# Patient Record
Sex: MALE | Race: WHITE | NOT HISPANIC OR LATINO | Employment: OTHER | URBAN - METROPOLITAN AREA
[De-identification: names, ages, dates, MRNs, and addresses within clinical notes are randomized per-mention and may not be internally consistent; named-entity substitution may affect disease eponyms.]

---

## 2017-01-06 ENCOUNTER — OFFICE VISIT (OUTPATIENT)
Dept: OPTOMETRY | Facility: CLINIC | Age: 77
End: 2017-01-06
Payer: MEDICARE

## 2017-01-06 DIAGNOSIS — E11.9 TYPE 2 DIABETES MELLITUS WITHOUT RETINOPATHY (H): Primary | ICD-10-CM

## 2017-01-06 DIAGNOSIS — H52.03 HYPEROPIA OF BOTH EYES WITH ASTIGMATISM AND PRESBYOPIA: ICD-10-CM

## 2017-01-06 DIAGNOSIS — H40.003 GLAUCOMA SUSPECT, BILATERAL: ICD-10-CM

## 2017-01-06 DIAGNOSIS — H52.4 HYPEROPIA OF BOTH EYES WITH ASTIGMATISM AND PRESBYOPIA: ICD-10-CM

## 2017-01-06 DIAGNOSIS — H43.812 PVD (POSTERIOR VITREOUS DETACHMENT), LEFT EYE: ICD-10-CM

## 2017-01-06 DIAGNOSIS — H26.9 CATARACTS, BOTH EYES: ICD-10-CM

## 2017-01-06 DIAGNOSIS — H52.203 HYPEROPIA OF BOTH EYES WITH ASTIGMATISM AND PRESBYOPIA: ICD-10-CM

## 2017-01-06 PROCEDURE — 92015 DETERMINE REFRACTIVE STATE: CPT | Mod: GY | Performed by: OPTOMETRIST

## 2017-01-06 PROCEDURE — 92014 COMPRE OPH EXAM EST PT 1/>: CPT | Performed by: OPTOMETRIST

## 2017-01-06 ASSESSMENT — CUP TO DISC RATIO
OD_RATIO: 0.6
OS_RATIO: 0.8

## 2017-01-06 ASSESSMENT — REFRACTION_WEARINGRX
OS_SPHERE: +1.25
OS_AXIS: 008
OS_ADD: +2.75
OD_SPHERE: +1.75
OD_ADD: +2.75
OD_AXIS: 167
OD_CYLINDER: +0.75
OS_CYLINDER: +0.50

## 2017-01-06 ASSESSMENT — REFRACTION_MANIFEST
OD_AXIS: 170
OS_SPHERE: +1.50
OD_ADD: +2.50
OD_CYLINDER: +0.75
OD_SPHERE: +1.75
OS_CYLINDER: +0.50
OS_AXIS: 005
OS_ADD: +2.50

## 2017-01-06 ASSESSMENT — TONOMETRY
IOP_METHOD: APPLANATION
OS_IOP_MMHG: 20
OD_IOP_MMHG: 16

## 2017-01-06 ASSESSMENT — SLIT LAMP EXAM - LIDS
COMMENTS: 2+ DERMATOCHALASIS - UPPER LID
COMMENTS: 2+ DERMATOCHALASIS - UPPER LID

## 2017-01-06 ASSESSMENT — VISUAL ACUITY
OS_SC: 20/200
OS_SC+: -2
OS_SC: 20/60
METHOD: SNELLEN - LINEAR
OD_SC: 20/80
OD_SC: 20/50

## 2017-01-06 ASSESSMENT — CONF VISUAL FIELD
OD_NORMAL: 1
OS_NORMAL: 1

## 2017-01-06 ASSESSMENT — EXTERNAL EXAM - LEFT EYE: OS_EXAM: 1+ BROW PTOSIS

## 2017-01-06 ASSESSMENT — EXTERNAL EXAM - RIGHT EYE: OD_EXAM: 1+ BROW PTOSIS

## 2017-01-06 NOTE — PROGRESS NOTES
Chief Complaint   Patient presents with     Diabetic Eye Exam     Accompanied by self  A1C      6.7   6/13/2016  A1C      5.8   4/22/2014  A1C      6.4   10/16/2013  A1C      6.7   3/26/2013  A1C      6.4   8/12/2011    Last Eye Exam: 2 years ago  Dilated Previously: Yes    What are you currently using to see?  glasses and readers- didn't bring glasses, has rx sunglasses with today    Distance Vision Acuity: Satisfied with vision    Near Vision Acuity: Not satisfied     Eye Comfort: good  Do you use eye drops? : No  Occupation or Hobbies: retired    Ruchi Youssef, [employer]     Medical, surgical and family histories reviewed and updated 1/6/2017.       OBJECTIVE: See Ophthalmology exam    ASSESSMENT:    ICD-10-CM    1. Type 2 diabetes mellitus without retinopathy (H) E11.9 EYE EXAM (SIMPLE-NONBILLABLE)   2. Glaucoma suspect, bilateral H40.003 EYE EXAM (SIMPLE-NONBILLABLE)     OPHTHALMOLOGY ADULT REFERRAL   3. Cataracts, both eyes H26.9 EYE EXAM (SIMPLE-NONBILLABLE)   4. PVD (posterior vitreous detachment), left eye H43.812 EYE EXAM (SIMPLE-NONBILLABLE)   5. Hyperopia of both eyes with astigmatism and presbyopia H52.03 EYE EXAM (SIMPLE-NONBILLABLE)    H52.203 REFRACTION    H52.4       PLAN:  Monitor, Keep blood sugar under control  Sent letter to primary care provider regarding diabetes.  Marcos Downing aware  eye exam results will be sent to Lyndon Patrick.    Refer to ophthalmology for visual fields and OCT (further testing for glaucoma)    Monitor cataracts by having yearly exams.  Wear sunglasses when outside.    A posterior vitreous detachment is nothing to worry about.  You have a jelly like substance that fills the inside of the eye, as you get older, that gel shrinks a little and when it shrinks, it pulls away from the back of the eye.  When it pulls away you can see a floater, as time goes on, the floater may shrink down out of your line of sight and you won't notice it so often.  There is  a little greater risk of developing a retinal detachment since there's nothing pressing against the retina, so if you start to notice flashes of light or sudden vision changes, return to the clinic as soon as possible, otherwise return as needed.    A final glasses prescription was given.  Allow time for adaptation.  The glasses may cause dizziness and affect depth perception for awhile.  Return to clinic 1 year for Comprehensive Vision Exam      Windy Phoenix O.D  [address]  [place]  [address]    [phone]

## 2017-01-06 NOTE — Clinical Note
Gabo Castillo, This is a glaucoma suspect patient that you've seen in the past.  It's been a few years since you've seen him so I thought he should probably have visual fields and OCT done again to see if there are any changes. He has an appointment set up with you on 2/3/17. Thanks, Windy

## 2017-01-06 NOTE — MR AVS SNAPSHOT
After Visit Summary   1/6/2017    Marcos Downing    MRN: [id number]           Patient Information     Date Of Birth          1940        Visit Information        Provider Department      1/6/2017 10:30 AM Windy Phoenix OD [facility]        Today's Diagnoses     Type 2 diabetes mellitus without retinopathy (H)    -  1     Glaucoma suspect, bilateral         Cataracts, both eyes         PVD (posterior vitreous detachment), left eye         Hyperopia of both eyes with astigmatism and presbyopia           Care Instructions        Monitor, Keep blood sugar under control  Sent letter to primary care provider regarding diabetes    Refer to ophthalmology for visual fields and OCT (further testing for glaucoma)    Monitor cataracts by having yearly exams.  Wear sunglasses when outside.    A posterior vitreous detachment is nothing to worry about.  You have a jelly like substance that fills the inside of the eye, as you get older, that gel shrinks a little and when it shrinks, it pulls away from the back of the eye.  When it pulls away you can see a floater, as time goes on, the floater may shrink down out of your line of sight and you won't notice it so often.  There is a little greater risk of developing a retinal detachment since there's nothing pressing against the retina, so if you start to notice flashes of light or sudden vision changes, return to the clinic as soon as possible, otherwise return as needed.    A final glasses prescription was given.  Allow time for adaptation.  The glasses may cause dizziness and affect depth perception for awhile.  Return to clinic 1 year for Comprehensive Vision Exam      Windy Phoenix O.D  [facility]  [address]  [place]  [address]    [phone]                Follow-ups after your visit        Additional Services     OPHTHALMOLOGY ADULT REFERRAL       Your provider has referred you to:  [facility]: Dionisio Bonner  "Ceasar Bonner [phone]   [email or web address]      Please be aware that coverage of these services is subject to the terms and limitations of your health insurance plan.  Call member services at your health plan with any benefit or coverage questions.      Please bring the following to your appointment:  >>   Any x-rays, CTs or MRIs which have been performed.  Contact the facility where they were done to arrange for  prior to your scheduled appointment.  Any new CT, MRI or other procedures ordered by your specialist must be performed at a [facility] facility or coordinated by your clinic's referral office.    >>   List of current medications   >>   This referral request   >>   Any documents/labs given to you for this referral                  Follow-up notes from your care team     Return in about 1 year (around 1/6/2018) for Eye Exam.      Who to contact     If you have questions or need follow up information about today's clinic visit or your schedule please contact [facility] directly at [phone].  Normal or non-critical lab and imaging results will be communicated to you by MyChart, letter or phone within 4 business days after the clinic has received the results. If you do not hear from us within 7 days, please contact the clinic through MyChart or phone. If you have a critical or abnormal lab result, we will notify you by phone as soon as possible.  Submit refill requests through [employer] or call your pharmacy and they will forward the refill request to us. Please allow 3 business days for your refill to be completed.          Additional Information About Your Visit        Neuronexhart Information     [employer]Out lets you send messages to your doctor, view your test results, renew your prescriptions, schedule appointments and more. To sign up, go to [email or web address] . Click on \"Log in\" on the left side of the screen, which will take you to the Welcome page. Then " "click on \"Sign up Now\" on the right side of the page.     You will be asked to enter the access code listed below, as well as some personal information. Please follow the directions to create your username and password.     Your access code is: [email or web address]  Expires: 2017 11:49 AM     Your access code will  in 90 days. If you need help or a new code, please call your [facility] clinic or [phone].        Care EveryWhere ID     This is your Care EveryWhere ID. This could be used by other organizations to access your [facility] medical records  [id number]         Blood Pressure from Last 3 Encounters:   16 128/62   14 118/59   14 134/60    Weight from Last 3 Encounters:   16 85.503 kg (188 lb 8 oz)   14 82.283 kg (181 lb 6.4 oz)   14 82.645 kg (182 lb 3.2 oz)              We Performed the Following     EYE EXAM (SIMPLE-NONBILLABLE)     OPHTHALMOLOGY ADULT REFERRAL     REFRACTION        Primary Care Provider Office Phone # Fax #    Lyndon Patrick [phone]       [address] [address]        Thank you!     Thank you for choosing [facility]  for your care. Our goal is always to provide you with excellent care. Hearing back from our patients is one way we can continue to improve our services. Please take a few minutes to complete the written survey that you may receive in the mail after your visit with us. Thank you!             Your Updated Medication List - Protect others around you: Learn how to safely use, store and throw away your medicines at www.disposemymeds.org.          This list is accurate as of: 17 11:49 AM.  Always use your most recent med list.                   Brand Name Dispense Instructions for use    ALLEGRA 180 MG tablet   Generic drug:  fexofenadine      Take 1 tablet by mouth daily As needed       ANTIOXIDANT WITH CO Q-10 PO      Take 1 tablet by mouth daily       aspirin " 325 MG EC tablet      Take 325 mg by mouth daily.       atorvastatin 10 MG tablet    LIPITOR    90 tablet    Take 1 tablet (10 mg) by mouth daily       blood glucose monitoring lancets     1 Box    USE 1 DAILY       blood glucose monitoring test strip    ACCU-CHEK SMARTVIEW    100 strip    1 strip by In Vitro route daily       DAILY VITAMINS PO      Take  by mouth. daily       terbinafine 250 MG tablet    lamISIL    90 tablet    Take 1 tablet (250 mg) by mouth daily

## 2017-01-06 NOTE — Clinical Note
[facility]  Optometry Department      1/6/2017    Re:  Marcos Downing  1940   [id number]    Dear Dr. Patrick,    Your patient was seen in our office on 1/6/2017 for a dilated diabetic eye exam.      Findings:    No Retinopathy  OU - Both  Glaucoma suspect both eyes   Cataracts both eyes   Posterior Vitreous Detachment left eye     Comments:  Marcos should return for a comprehensive eye exam in 1 year.    Sincerely,        Windy Phoenix O.D  [address]  [place]  [address]    [phone]

## 2017-02-03 ENCOUNTER — OFFICE VISIT (OUTPATIENT)
Dept: OPHTHALMOLOGY | Facility: CLINIC | Age: 77
End: 2017-02-03
Payer: MEDICARE

## 2017-02-03 DIAGNOSIS — H43.812 POSTERIOR VITREOUS DETACHMENT, LEFT: ICD-10-CM

## 2017-02-03 DIAGNOSIS — H40.003 GLAUCOMA SUSPECT, BILATERAL: Primary | ICD-10-CM

## 2017-02-03 PROCEDURE — 92012 INTRM OPH EXAM EST PATIENT: CPT | Performed by: OPHTHALMOLOGY

## 2017-02-03 PROCEDURE — 92083 EXTENDED VISUAL FIELD XM: CPT | Performed by: OPHTHALMOLOGY

## 2017-02-03 PROCEDURE — 92133 CPTRZD OPH DX IMG PST SGM ON: CPT | Performed by: OPHTHALMOLOGY

## 2017-02-03 ASSESSMENT — VISUAL ACUITY
OD_CC: 20/25-2
METHOD: SNELLEN - LINEAR
OS_CC: 20/30+2

## 2017-02-03 ASSESSMENT — TONOMETRY
IOP_METHOD: APPLANATION
OS_IOP_MMHG: 19
OD_IOP_MMHG: 15

## 2017-02-03 NOTE — MR AVS SNAPSHOT
"              After Visit Summary   2/3/2017    Marcos Downing    MRN: [id number]           Patient Information     Date Of Birth          1940        Visit Information        Provider Department      2/3/2017 3:15 PM Regis Castillo MD [facility]        Care Instructions    Continue observation regarding glaucoma suspect status.  Call in 2017 for an appointment in 2018 for Complete Exam    Dr. Castillo [phone]        Follow-ups after your visit        Who to contact     If you have questions or need follow up information about today's clinic visit or your schedule please contact [facility] directly at [phone].  Normal or non-critical lab and imaging results will be communicated to you by MyChart, letter or phone within 4 business days after the clinic has received the results. If you do not hear from us within 7 days, please contact the clinic through MyChart or phone. If you have a critical or abnormal lab result, we will notify you by phone as soon as possible.  Submit refill requests through [employer] or call your pharmacy and they will forward the refill request to us. Please allow 3 business days for your refill to be completed.          Additional Information About Your Visit        MyChart Information     L[employer]rd lets you send messages to your doctor, view your test results, renew your prescriptions, schedule appointments and more. To sign up, go to [email or web address] . Click on \"Log in\" on the left side of the screen, which will take you to the Welcome page. Then click on \"Sign up Now\" on the right side of the page.     You will be asked to enter the access code listed below, as well as some personal information. Please follow the directions to create your username and password.     Your access code is: [email or web address]  Expires: 2017 11:49 AM     Your access code will  in 90 days. If you need help or a new code, please call your " [facility] or [phone].        Care EveryWhere ID     This is your Care EveryWhere ID. This could be used by other organizations to access your [facility] medical records  [id number]         Blood Pressure from Last 3 Encounters:   06/13/16 128/62   06/06/14 118/59   05/07/14 134/60    Weight from Last 3 Encounters:   06/13/16 85.503 kg (188 lb 8 oz)   06/06/14 82.283 kg (181 lb 6.4 oz)   05/07/14 82.645 kg (182 lb 3.2 oz)              Today, you had the following     No orders found for display       Primary Care Provider Office Phone # Fax #    Lyndon Patrick [phone]       [facility] [address] [place] [address]        Thank you!     Thank you for choosing [facility]  for your care. Our goal is always to provide you with excellent care. Hearing back from our patients is one way we can continue to improve our services. Please take a few minutes to complete the written survey that you may receive in the mail after your visit with us. Thank you!             Your Updated Medication List - Protect others around you: Learn how to safely use, store and throw away your medicines at www.disposemymeds.org.          This list is accurate as of: 2/3/17  4:51 PM.  Always use your most recent med list.                   Brand Name Dispense Instructions for use    ALLEGRA 180 MG tablet   Generic drug:  fexofenadine      Take 1 tablet by mouth daily As needed       ANTIOXIDANT WITH CO Q-10 PO      Take 1 tablet by mouth daily       aspirin 325 MG EC tablet      Take 325 mg by mouth daily.       atorvastatin 10 MG tablet    LIPITOR    90 tablet    Take 1 tablet (10 mg) by mouth daily       blood glucose monitoring lancets     1 Box    USE 1 DAILY       blood glucose monitoring test strip    ACCU-CHEK SMARTVIEW    100 strip    1 strip by In Vitro route daily       DAILY VITAMINS PO      Take  by mouth. daily       terbinafine 250 MG tablet    lamISIL    90  tablet    Take 1 tablet (250 mg) by mouth daily

## 2017-02-03 NOTE — PROGRESS NOTES
Current Eye Medications:  none     Subjective:  Referred by Dr Phoenix for GLC eval. HVF, OCT, & IOP check. No vision changes or concerns.    Was hit by ball left eye as child.       Objective:  See Ophthalmology Exam.       Assessment:  Stable intraocular pressure, glaucoma OCT, and [place] Visual Field both eyes in patient who is a glaucoma suspect.      Plan: Continue observation regarding glaucoma suspect status.  Call in September 2017 for an appointment in January 2018 for Complete Exam    Dr. Castillo [phone]

## 2017-02-03 NOTE — PATIENT INSTRUCTIONS
Continue observation regarding glaucoma suspect status.  Call in September 2017 for an appointment in January 2018 for Complete Exam    Dr. Castillo [phone]

## 2017-02-04 PROBLEM — H43.812 POSTERIOR VITREOUS DETACHMENT, LEFT: Status: ACTIVE | Noted: 2017-02-04

## 2017-02-04 ASSESSMENT — EXTERNAL EXAM - RIGHT EYE: OD_EXAM: 1+ BROW PTOSIS

## 2017-02-04 ASSESSMENT — SLIT LAMP EXAM - LIDS
COMMENTS: 2+ DERMATOCHALASIS - UPPER LID
COMMENTS: 2+ DERMATOCHALASIS - UPPER LID

## 2017-02-04 ASSESSMENT — PACHYMETRY
OD_CT(UM): 598
OS_CT(UM): 578

## 2017-02-04 ASSESSMENT — EXTERNAL EXAM - LEFT EYE: OS_EXAM: 1+ BROW PTOSIS

## 2017-06-05 ENCOUNTER — OFFICE VISIT (OUTPATIENT)
Dept: FAMILY MEDICINE | Facility: CLINIC | Age: 77
End: 2017-06-05
Payer: MEDICARE

## 2017-06-05 VITALS
OXYGEN SATURATION: 96 % | HEIGHT: 71 IN | HEART RATE: 78 BPM | BODY MASS INDEX: 26.54 KG/M2 | DIASTOLIC BLOOD PRESSURE: 72 MMHG | SYSTOLIC BLOOD PRESSURE: 136 MMHG | TEMPERATURE: 97.4 F | WEIGHT: 189.6 LBS

## 2017-06-05 DIAGNOSIS — N40.0 BENIGN PROSTATIC HYPERPLASIA WITHOUT LOWER URINARY TRACT SYMPTOMS, UNSPECIFIED MORPHOLOGY: ICD-10-CM

## 2017-06-05 DIAGNOSIS — R35.0 URINARY FREQUENCY: ICD-10-CM

## 2017-06-05 DIAGNOSIS — R35.0 FREQUENCY OF MICTURITION: Primary | ICD-10-CM

## 2017-06-05 DIAGNOSIS — J32.0 CHRONIC MAXILLARY SINUSITIS: ICD-10-CM

## 2017-06-05 DIAGNOSIS — E78.5 HYPERLIPIDEMIA LDL GOAL <100: ICD-10-CM

## 2017-06-05 DIAGNOSIS — E11.65 TYPE 2 DIABETES MELLITUS WITH HYPERGLYCEMIA, WITHOUT LONG-TERM CURRENT USE OF INSULIN (H): ICD-10-CM

## 2017-06-05 DIAGNOSIS — Z23 NEED FOR PROPHYLACTIC VACCINATION AGAINST STREPTOCOCCUS PNEUMONIAE (PNEUMOCOCCUS): ICD-10-CM

## 2017-06-05 DIAGNOSIS — Z13.89 SCREENING FOR DIABETIC PERIPHERAL NEUROPATHY: ICD-10-CM

## 2017-06-05 LAB
ALBUMIN UR-MCNC: NEGATIVE MG/DL
APPEARANCE UR: CLEAR
BILIRUB UR QL STRIP: NEGATIVE
COLOR UR AUTO: YELLOW
GLUCOSE UR STRIP-MCNC: NEGATIVE MG/DL
HGB UR QL STRIP: ABNORMAL
KETONES UR STRIP-MCNC: NEGATIVE MG/DL
LEUKOCYTE ESTERASE UR QL STRIP: NEGATIVE
NITRATE UR QL: NEGATIVE
PH UR STRIP: 6.5 PH (ref 5–7)
RBC #/AREA URNS AUTO: NORMAL /HPF (ref 0–2)
SP GR UR STRIP: 1.01 (ref 1–1.03)
URN SPEC COLLECT METH UR: ABNORMAL
UROBILINOGEN UR STRIP-ACNC: 0.2 EU/DL (ref 0.2–1)
WBC #/AREA URNS AUTO: NORMAL /HPF (ref 0–2)

## 2017-06-05 PROCEDURE — 99214 OFFICE O/P EST MOD 30 MIN: CPT | Mod: 25 | Performed by: FAMILY MEDICINE

## 2017-06-05 PROCEDURE — 81001 URINALYSIS AUTO W/SCOPE: CPT | Performed by: FAMILY MEDICINE

## 2017-06-05 PROCEDURE — G0009 ADMIN PNEUMOCOCCAL VACCINE: HCPCS | Performed by: FAMILY MEDICINE

## 2017-06-05 PROCEDURE — 90670 PCV13 VACCINE IM: CPT | Performed by: FAMILY MEDICINE

## 2017-06-05 RX ORDER — FLUTICASONE PROPIONATE 50 MCG
2 SPRAY, SUSPENSION (ML) NASAL DAILY
Qty: 16 G | Refills: 1 | Status: SHIPPED | OUTPATIENT
Start: 2017-06-05 | End: 2018-05-24

## 2017-06-05 NOTE — PATIENT INSTRUCTIONS
[facility]    If you have any questions regarding to your visit please contact your care team:       Team Purple:   Clinic Hours Telephone Number   Dr. Cate Jane     7am-7pm  Monday - Thursday   7am-5pm  Fridays  [phone]  (Appointment scheduling available 24/7)    Questions about your Visit?   Team Line:  [phone]   Urgent Care - [place] and [facility] - 11am-9pm Monday-Friday Saturday-Sunday- 9am-5pm   [place] - 5pm-9pm Monday-Friday Saturday-Sunday- 9am-5pm  [phone] - [facility]  [phone] - [place]       What options do I have for visits at the clinic other than the traditional office visit?  To expand how we care for you, many of our providers are utilizing electronic visits (e-visits) and telephone visits, when medically appropriate, for interactions with their patients rather than a visit in the clinic.   We also offer nurse visits for many medical concerns. Just like any other service, we will bill your insurance company for this type of visit based on time spent on the phone with your provider. Not all insurance companies cover these visits. Please check with your medical insurance if this type of visit is covered. You will be responsible for any charges that are not paid by your insurance.      E-visits via Neuronex:  generally incur a $35.00 fee.  Telephone visits:  Time spent on the phone: *charged based on time that is spent on the phone in increments of 10 minutes. Estimated cost:   5-10 mins $30.00   11-20 mins. $59.00   21-30 mins. $85.00     Use [employer] (secure email communication and access to your chart) to send your primary care provider a message or make an appointment. Ask someone on your Team how to sign up for Neuronex.  For a Price Quote for your services, please call our Consumer Price Line at [phone].  As always, Thank you for trusting us with your health care needs!      Jenn Conti  CMA

## 2017-06-05 NOTE — NURSING NOTE
"Chief Complaint   Patient presents with     Urinary Frequency     ongoing issue and getting worse x 3 months      Sinusitis     runny nose     Dehydration     Medication Reconciliation     stopped atorvastatin x 1 yrs ago        Initial /72  Pulse 78  Temp 97.4  F (36.3  C) (Oral)  Ht 5' 11.14\" (1.807 m)  Wt 189 lb 9.6 oz (86 kg)  SpO2 96%  BMI 26.34 kg/m2 Estimated body mass index is 26.34 kg/(m^2) as calculated from the following:    Height as of this encounter: 5' 11.14\" (1.807 m).    Weight as of this encounter: 189 lb 9.6 oz (86 kg).  Medication Reconciliation: complete  An CLAUDIA Sevilla    "

## 2017-06-05 NOTE — MR AVS SNAPSHOT
After Visit Summary   6/5/2017    Marcos Downing    MRN: [id number]           Patient Information     Date Of Birth          1940        Visit Information        Provider Department      6/5/2017 11:20 AM Lyndon Patrick MD [facility]        Today's Diagnoses     Frequency of micturition    -  1    Benign prostatic hyperplasia without lower urinary tract symptoms, unspecified morphology        Chronic maxillary sinusitis        Type 2 diabetes mellitus with hyperglycemia, without long-term current use of insulin (H)        Urinary frequency        Screening for diabetic peripheral neuropathy        Need for prophylactic vaccination against Streptococcus pneumoniae (pneumococcus)        Hyperlipidemia LDL goal <100          Care Instructions    [facility]    If you have any questions regarding to your visit please contact your care team:       Team Purple:   Clinic Hours Telephone Number   Dr. Cate Jane     7am-7pm  Monday - Thursday   7am-5pm  Fridays  [phone]  (Appointment scheduling available 24/7)    Questions about your Visit?   Team Line:  [phone]   Urgent Care - [place] - 11am-9pm Monday-Friday Saturday-Sunday- 9am-5pm   [place] - 5pm-9pm Monday-Friday Saturday-Sunday- 9am-5pm  [phone] - [place]   [phone] - [place]       What options do I have for visits at the clinic other than the traditional office visit?  To expand how we care for you, many of our providers are utilizing electronic visits (e-visits) and telephone visits, when medically appropriate, for interactions with their patients rather than a visit in the clinic.   We also offer nurse visits for many medical concerns. Just like any other service, we will bill your insurance company for this type of visit based on time spent on the phone with your provider. Not all insurance companies cover  these visits. Please check with your medical insurance if this type of visit is covered. You will be responsible for any charges that are not paid by your insurance.      E-visits via L2Chart:  generally incur a $35.00 fee.  Telephone visits:  Time spent on the phone: *charged based on time that is spent on the phone in increments of 10 minutes. Estimated cost:   5-10 mins $30.00   11-20 mins. $59.00   21-30 mins. $85.00     Use Mogotest (secure email communication and access to your chart) to send your primary care provider a message or make an appointment. Ask someone on your Team how to sign up for Mogotest.  For a Price Quote for your services, please call our [employer] Line at [phone].  As always, Thank you for trusting us with your health care needs!      Jenn Conti CMA            Follow-ups after your visit        Additional Services     OTOLARYNGOLOGY REFERRAL       Your provider has referred you to: [facility]: [facility] [phone]   [email or web address]    Please be aware that coverage of these services is subject to the terms and limitations of your health insurance plan.  Call member services at your health plan with any benefit or coverage questions.      Please bring the following with you to your appointment:    (1) Any X-Rays, CTs or MRIs which have been performed.  Contact the facility where they were done to arrange for  prior to your scheduled appointment.   (2) List of current medications  (3) This referral request   (4) Any documents/labs given to you for this referral            UROLOGY ADULT REFERRAL       Your provider has referred you to: [facility]: [facility] [phone]   [email or web address]    Please be aware that coverage of these services is subject to the terms and limitations of your health insurance plan.  Call member services at your health plan with any benefit or coverage  "questions.      Please bring the following to your appointment:    >>   Any x-rays, CTs or MRIs which have been performed.  Contact the facility where they were done to arrange for  prior to your scheduled appointment.  Any new CT, MRI or other procedures ordered by your specialist must be performed at a [facility] facility or coordinated by your clinic's referral office.    >>   List of current medications   >>   This referral request   >>   Any documents/labs given to you for this referral                  Future tests that were ordered for you today     Open Future Orders        Priority Expected Expires Ordered    HEMOGLOBIN A1C Routine  6/5/2018 6/5/2017    Albumin Random Urine Quantitative Routine  6/5/2018 6/5/2017    Basic metabolic panel Routine  6/5/2018 6/5/2017    Lipid panel reflex to direct LDL Routine  6/5/2018 6/5/2017            Who to contact     If you have questions or need follow up information about today's clinic visit or your schedule please contact [facility] directly at [phone].  Normal or non-critical lab and imaging results will be communicated to you by [employer], letter or phone within 4 business days after the clinic has received the results. If you do not hear from us within 7 days, please contact the clinic through [employer] or phone. If you have a critical or abnormal lab result, we will notify you by phone as soon as possible.  Submit refill requests through [employer] ([employer]) or call your pharmacy and they will forward the refill request to us. Please allow 3 business days for your refill to be completed.          Additional Information About Your Visit        [employer]) Information     [employer] lets you send messages to your doctor, view your test results, renew your prescriptions, schedule appointments and more. To sign up, go to [email or web address]) . Click on \"Log in\" on the left side of the screen, which will take you to the Welcome page. Then click on \"Sign up Now\" " "on the right side of the page.     You will be asked to enter the access code listed below, as well as some personal information. Please follow the directions to create your username and password.     Your access code is: [email or web address]  Expires: 9/3/2017 12:36 PM     Your access code will  in 90 days. If you need help or a new code, please call your [facility] clinic or [phone].        Care EveryWhere ID     This is your Care EveryWhere ID. This could be used by other organizations to access your [facility] medical records  [id number]        Your Vitals Were     Pulse Temperature Height Pulse Oximetry BMI (Body Mass Index)       78 97.4  F (36.3  C) (Oral) 5' 11.14\" (1.807 m) 96% 26.34 kg/m2        Blood Pressure from Last 3 Encounters:   17 136/72   16 128/62   14 118/59    Weight from Last 3 Encounters:   17 189 lb 9.6 oz (86 kg)   16 188 lb 8 oz (85.5 kg)   14 181 lb 6.4 oz (82.3 kg)              We Performed the Following     OTOLARYNGOLOGY REFERRAL     PNEUMOCOCCAL CONJ VACCINE 13 VALENT IM     UA reflex to Microscopic and Culture     Urine Microscopic     UROLOGY ADULT REFERRAL          Today's Medication Changes          These changes are accurate as of: 17 12:36 PM.  If you have any questions, ask your nurse or doctor.               Start taking these medicines.        Dose/Directions    fluticasone 50 MCG/ACT spray   Commonly known as:  FLONASE   Used for:  Chronic maxillary sinusitis   Started by:  Lyndon Patrick MD        Dose:  2 spray   Spray 2 sprays into both nostrils daily   Quantity:  16 g   Refills:  1            Where to get your medicines      These medications were sent to [facility] - [place], [place] - [address]  [address], [facility] [address]     Phone:  [phone]     fluticasone 50 MCG/ACT spray                Primary Care Provider Office Phone # Fax #    Lyndon Patrick MD " [phone]       [facility] [address]  [facility] [address]        Thank you!     Thank you for choosing [facility]  for your care. Our goal is always to provide you with excellent care. Hearing back from our patients is one way we can continue to improve our services. Please take a few minutes to complete the written survey that you may receive in the mail after your visit with us. Thank you!             Your Updated Medication List - Protect others around you: Learn how to safely use, store and throw away your medicines at www.disposemymeds.org.          This list is accurate as of: 6/5/17 12:36 PM.  Always use your most recent med list.                   Brand Name Dispense Instructions for use    ALLEGRA 180 MG tablet   Generic drug:  fexofenadine      Take 1 tablet by mouth daily As needed       ANTIOXIDANT WITH CO Q-10 PO      Take 1 tablet by mouth daily       aspirin 325 MG EC tablet      Take 325 mg by mouth daily.       atorvastatin 10 MG tablet    LIPITOR    90 tablet    Take 1 tablet (10 mg) by mouth daily       blood glucose monitoring lancets     1 Box    USE 1 DAILY       blood glucose monitoring test strip    ACCU-CHEK SMARTVIEW    100 strip    1 strip by In Vitro route daily       DAILY VITAMINS PO      Take  by mouth. daily       fluticasone 50 MCG/ACT spray    FLONASE    16 g    Spray 2 sprays into both nostrils daily       terbinafine 250 MG tablet    lamISIL    90 tablet    Take 1 tablet (250 mg) by mouth daily

## 2017-06-05 NOTE — PROGRESS NOTES
SUBJECTIVE:                                                    Marcos Downing is a 77 year old male who presents to clinic today for the following health issues:    ENT Symptoms             Symptoms: cc Present Absent Comment   Fever/Chills   x    Fatigue   x    Muscle Aches   x    Eye Irritation   x    Sneezing  x     Nasal Siddharth/Drg  x  Runny nose, Nasal congested    Sinus Pressure/Pain  x     Loss of smell   x    Dental pain   x    Sore Throat   x    Swollen Glands   x    Ear Pain/Fullness   x    Cough   x    Wheeze   x    Chest Pain   x    Shortness of breath   x    Rash   x    Other  x  Blood when blowing nose      Symptom duration:  ongoing    Symptom severity:  severe    Treatments tried:  Allegra D    Contacts:  none       Genitourinary symptoms      Duration: ongoing issue but for the last 3 months it is getting worse     Description:  frequency and retention    Intensity:  severe    Accompanying signs and symptoms (fever/discharge/nausea/vomiting/back or abdominal pain):  none    History (frequent UTI's/kidney stones/prostate problems): None  Sexually active: no     Precipitating or alleviating factors: None    Therapies tried and outcome: none   Outcome: none     Been going for several years but gotten worse lately  Has hard accidents.    Sinus problem   Been cutting on fluid   Been having bleed from monika ijn the AM from left side mainly  Pressure behind the eyes. Clears throat a lot.    Diabetes Follow-up      Patient is checking blood sugars: rarely.  Results range from 110 to 170's    Diabetic concerns: None     Symptoms of hypoglycemia (low blood sugar): none     Paresthesias (numbness or burning in feet) or sores: No     Date of last diabetic eye exam: 1/6/2016     Hyperlipidemia Follow-Up      Rate your low fat/cholesterol diet?: good    Taking statin?  No    Other lipid medications/supplements?:  none       Problem list and histories reviewed & adjusted, as indicated.  Additional history: as  "documented    Patient Active Problem List   Diagnosis     Hyperlipidemia with target LDL less than 100     Type 2 diabetes mellitus with hemoglobin A1c goal of less than 7.0% (H)     Cataracts, both eyes     Type 2 diabetes mellitus without retinopathy (H)     Glaucoma suspect, bilateral     Posterior vitreous detachment, left     Past Surgical History:   Procedure Laterality Date     CYSTOSCOPY, DILATE URETHRA, COMBINED     1996     TONSILLECTOMY      T & A  age 3     VASECTOMY  Age 39       Social History   Substance Use Topics     Smoking status: Former Smoker     Quit date: 4/1/1971     Smokeless tobacco: Never Used     Alcohol use Yes      Comment: special occasions only     Family History   Problem Relation Age of Onset     DIABETES Mother      HEART DISEASE Mother      DIABETES Father      DIABETES Brother      CANCER Brother      CANCER Sister      DIABETES Sister      DIABETES Brother      Glaucoma No family hx of      Macular Degeneration No family hx of            Reviewed and updated as needed this visit by clinical staff  Tobacco  Allergies  Meds  Med Hx  Surg Hx  Fam Hx  Soc Hx      Reviewed and updated as needed this visit by Provider         ROS:  Constitutional, HEENT, cardiovascular, pulmonary, gi and gu systems are negative, except as otherwise noted.    OBJECTIVE:                                                    /72  Pulse 78  Temp 97.4  F (36.3  C) (Oral)  Ht 5' 11.14\" (1.807 m)  Wt 189 lb 9.6 oz (86 kg)  SpO2 96%  BMI 26.34 kg/m2  Body mass index is 26.34 kg/(m^2).  GENERAL: healthy, alert and no distress  ENT: Nasal congestion, no polyp, no sinus tenderness  NECK: no adenopathy and thyroid normal to palpation  RESP: lungs clear to auscultation - no rales, rhonchi or wheezes  CV: regular rate and rhythm, normal S1 S2, no S3 or S4, no murmur, click or rub  ABDOMEN: soft, nontender, no masses and bowel sounds normal  MS: no gross musculoskeletal defects noted, no edema  SKIN: " no suspicious lesions or rashes  NEURO: Normal strength and tone, mentation intact and speech normal  PSYCH: mentation appears normal, affect normal/bright  Diabetic foot exam: normal DP and PT pulses, no trophic changes or ulcerative lesions and normal sensory exam    Diagnostic Test Results:     Results for orders placed or performed in visit on 06/05/17   UA reflex to Microscopic and Culture   Result Value Ref Range    Color Urine Yellow     Appearance Urine Clear     Glucose Urine Negative NEG mg/dL    Bilirubin Urine Negative NEG    Ketones Urine Negative NEG mg/dL    Specific Gravity Urine 1.010 1.003 - 1.035    Blood Urine Trace (A) NEG    pH Urine 6.5 5.0 - 7.0 pH    Protein Albumin Urine Negative NEG mg/dL    Urobilinogen Urine 0.2 0.2 - 1.0 EU/dL    Nitrite Urine Negative NEG    Leukocyte Esterase Urine Negative NEG    Source Midstream Urine    Urine Microscopic   Result Value Ref Range    WBC Urine O - 2 0 - 2 /HPF    RBC Urine O - 2 0 - 2 /HPF          ASSESSMENT/PLAN:                                                    (R35.0) Frequency of micturition  (primary encounter diagnosis)  Comment: Discussed evaluation by urology due to worsening obstructive symptoms  Plan: UROLOGY ADULT REFERRAL    (N40.0) Benign prostatic hyperplasia without lower urinary tract symptoms, unspecified morphology  Plan: UROLOGY ADULT REFERRAL    (R35.0) Urinary frequency  Comment: UA normal  Plan: UA reflex to Microscopic and Culture, Urine         Microscopic    (J32.0) Chronic maxillary sinusitis  Comment: This has been a chronic issue, will do a decongestant at this time but have evaluation by ENT incase of another pathological process  Plan: OTOLARYNGOLOGY REFERRAL, fluticasone (FLONASE)         50 MCG/ACT spray    (E11.65) Type 2 diabetes mellitus with hyperglycemia, without long-term current use of insulin (H)  Comment: Diet controlled. Blood work when fasting  Plan: HEMOGLOBIN A1C, Albumin Random Urine          Quantitative, Basic metabolic panel, Lipid         panel reflex to direct LDL    (Z23) Need for prophylactic vaccination against Streptococcus pneumoniae (pneumococcus)  Comment: Pneumonia shot  Plan: PNEUMOCOCCAL CONJ VACCINE 13 VALENT IM    Lyndon Patrick MD  [facility]

## 2017-06-08 ENCOUNTER — OFFICE VISIT (OUTPATIENT)
Dept: OTOLARYNGOLOGY | Facility: CLINIC | Age: 77
End: 2017-06-08
Payer: MEDICARE

## 2017-06-08 VITALS — BODY MASS INDEX: 26.46 KG/M2 | RESPIRATION RATE: 16 BRPM | WEIGHT: 189 LBS | HEIGHT: 71 IN

## 2017-06-08 DIAGNOSIS — R04.0 EPISTAXIS: ICD-10-CM

## 2017-06-08 DIAGNOSIS — J31.0 CHRONIC RHINITIS: Primary | ICD-10-CM

## 2017-06-08 PROCEDURE — 99203 OFFICE O/P NEW LOW 30 MIN: CPT | Performed by: OTOLARYNGOLOGY

## 2017-06-08 RX ORDER — IPRATROPIUM BROMIDE 42 UG/1
2 SPRAY, METERED NASAL 4 TIMES DAILY PRN
Qty: 1 BOX | Refills: 11 | Status: SHIPPED | OUTPATIENT
Start: 2017-06-08 | End: 2018-05-24

## 2017-06-08 ASSESSMENT — PAIN SCALES - GENERAL: PAINLEVEL: NO PAIN (0)

## 2017-06-08 NOTE — PROGRESS NOTES
Chief Complaint - nasal drainage, epistaxis    History of Present Illness - Marcos Downing is a 77 year old male who presents for evaluation of nose symptoms. The patient describes symptoms of clear rhinorrhea, sometimes bloody. Bleeding in the morning, left side. Also has worse runny nose with eating. Chronic problem. Treatments include flonase. Also has a lot of throat clearing.     Past Medical History -   Patient Active Problem List   Diagnosis     Hyperlipidemia with target LDL less than 100     Type 2 diabetes mellitus with hemoglobin A1c goal of less than 7.0% (H)     Cataracts, both eyes     Type 2 diabetes mellitus without retinopathy (H)     Glaucoma suspect, bilateral     Posterior vitreous detachment, left       Current Medications -   Current Outpatient Prescriptions:      fluticasone (FLONASE) 50 MCG/ACT spray, Spray 2 sprays into both nostrils daily, Disp: 16 g, Rfl: 1     blood glucose (ACCU-CHEK SMARTVIEW) test strip, 1 strip by In Vitro route daily, Disp: 100 strip, Rfl: 0     blood glucose monitoring (SOFTCLIX) lancets, USE 1 DAILY, Disp: 1 Box, Rfl: 20     terbinafine (LAMISIL) 250 MG tablet, Take 1 tablet (250 mg) by mouth daily, Disp: 90 tablet, Rfl: 0     fexofenadine (ALLEGRA) 180 MG tablet, Take 1 tablet by mouth daily As needed, Disp: , Rfl:      Nutritional Supplements (ANTIOXIDANT WITH CO Q-10 PO), Take 1 tablet by mouth daily, Disp: , Rfl:      atorvastatin (LIPITOR) 10 MG tablet, Take 1 tablet (10 mg) by mouth daily, Disp: 90 tablet, Rfl: 3     Multiple Vitamin (DAILY VITAMINS PO), Take  by mouth. daily, Disp: , Rfl:      aspirin 325 MG EC tablet, Take 325 mg by mouth daily., Disp: , Rfl:     Allergies -   Allergies   Allergen Reactions     Niaspan [Niacin]      Flushing.       Social History -   Social History     Social History     Marital status:      Spouse name: N/A     Number of children: N/A     Years of education: N/A     Social History Main Topics     Smoking status:  "Former Smoker     Quit date: 4/1/1971     Smokeless tobacco: Never Used     Alcohol use Yes      Comment: special occasions only     Drug use: No     Sexual activity: Yes     Partners: Female     Other Topics Concern     None     Social History Narrative       Family History -   Family History   Problem Relation Age of Onset     DIABETES Mother      HEART DISEASE Mother      DIABETES Father      DIABETES Brother      CANCER Brother      CANCER Sister      DIABETES Sister      DIABETES Brother      Glaucoma No family hx of      Macular Degeneration No family hx of        Review of Systems - As per HPI and PMHx, otherwise 7 system review of the head and neck negative.    Physical Exam  Resp 16  Ht 1.807 m (5' 11.14\")  Wt 85.7 kg (189 lb)  BMI 26.26 kg/m2  General - The patient is nontoxic, in no distress. Alert and oriented to person and place, answers questions and cooperates with examination appropriately.   Neurologic - CN II-XII are intact. No focal neurologic deficits.   Voice and Breathing - The patient was breathing comfortably without the use of accessory muscles. There was no wheezing, stridor, or stertor.  The patients voice was clear and strong.  Eyes - Extraocular movements intact.  Sclera were not icteric or injected, conjunctiva were pink and moist.  Mouth - Examination of the oral cavity showed pink, healthy oral mucosa. No lesions or ulcerations noted.  The tongue was mobile and midline.  Throat - The walls of the oropharynx were smooth, symmetric, and had no lesions or ulcerations.  No postnasal drainage.  The uvula was midline on elevation.  Nose - External contour is symmetric, no gross deflection or scars.  Nasal mucosa is pink and moist with no abnormal mucus.  The septum was midline and non-obstructive, turbinates of normal size and position.  No polyps, masses, or purulence noted on examination. I tried to palpate the left nasal septum with a q-tip to stimulate bleeding, but nothing bled. "   Neck - Palpation of the occipital, submental, submandibular, internal jugular chain, and supraclavicular nodes did not demonstrate any abnormal lymph nodes or masses. No parotid masses. Palpation of the thyroid was soft and smooth, with no nodules or goiter appreciated.  The trachea was mobile and midline.        A/P - Marcos Downing is a 77 year old male with vasomotor rhinitis and epistaxis. The epistaxis maybe from blowing the nose all the time. i'll have him try atrovent and use vaseline at night. Return if this fails.     Thompson Ayala MD  Otolaryngology  [facility]

## 2017-06-08 NOTE — MR AVS SNAPSHOT
After Visit Summary   6/8/2017    Marcos Downing    MRN: [id number]           Patient Information     Date Of Birth          1940        Visit Information        Provider Department      6/8/2017 8:30 AM Thompson Ayala MD [facility]        Today's Diagnoses     Chronic rhinitis    -  1    Epistaxis          Care Instructions    General Scheduling Information  To schedule your CT/MRI scan, please contact [place] [facility] at [phone] [address] [place] [address]    To schedule your Surgery, please contact our Specialty Schedulers at [phone]    ENT Clinic Locations Clinic Hours Telephone Number     Dionisio Bonner  [address]  [place] [address]   Tuesday:       8:00am -- 4:00pm    Wednesday:  8:00am - 4:00pm   To schedule an appointment with   Dr. Ayala,   please contact our   Specialty Scheduling Department at:     [phone]       Dionisio Leger  [address]   [place] [address]   Friday:          8:00am - 4:00pm         Urgent Care Locations Clinic Hours Telephone Numbers     Dionisio Fortune  [address]  [place] [address]     Monday-Friday:     11:00pm - 9:00pm    Saturday-Sunday:  9:00am - 5:00pm   [phone]     Dionisio Leger  [address]   [place] [address]     Monday-Friday:      5:00pm - 9:00pm     Saturday-Sunday:  9:00am - 5:00pm   [phone]               Follow-ups after your visit        Your next 10 appointments already scheduled     Jun 23, 2017 11:15 AM CDT   New Visit with Refugio Petty MD   [facility]    [address] [facility] [address] [phone]              Who to contact     If you have questions or need follow up information about today's clinic visit or your schedule please contact [facility] directly at [phone].  Normal or non-critical lab and imaging results will be communicated to you by  "MyChart, letter or phone within 4 business days after the clinic has received the results. If you do not hear from us within 7 days, please contact the clinic through [facility] or phone. If you have a critical or abnormal lab result, we will notify you by phone as soon as possible.  Submit refill requests through [facility] or call your pharmacy and they will forward the refill request to us. Please allow 3 business days for your refill to be completed.          Additional Information About Your Visit        [facility] Information     [employer] lets you send messages to your doctor, view your test results, renew your prescriptions, schedule appointments and more. To sign up, go to [email or web address] . Click on \"Log in\" on the left side of the screen, which will take you to the Welcome page. Then click on \"Sign up Now\" on the right side of the page.     You will be asked to enter the access code listed below, as well as some personal information. Please follow the directions to create your username and password.     Your access code is: [email or web address]  Expires: 9/3/2017 12:36 PM     Your access code will  in 90 days. If you need help or a new code, please call your [facility] clinic or [phone].        Care EveryWhere ID     This is your Care EveryWhere ID. This could be used by other organizations to access your [facility] medical records  [id number]        Your Vitals Were     Respirations Height BMI (Body Mass Index)             16 1.807 m (5' 11.14\") 26.26 kg/m2          Blood Pressure from Last 3 Encounters:   17 136/72   16 128/62   14 118/59    Weight from Last 3 Encounters:   17 85.7 kg (189 lb)   17 86 kg (189 lb 9.6 oz)   16 85.5 kg (188 lb 8 oz)              Today, you had the following     No orders found for display         Today's Medication Changes          These changes are accurate as of: 17  9:21 AM.  If you have any questions, ask your nurse or doctor.       "         Start taking these medicines.        Dose/Directions    ipratropium 0.06 % spray   Commonly known as:  ATROVENT   Used for:  Chronic rhinitis   Started by:  Thompson Ayala MD        Dose:  2 spray   Spray 2 sprays into both nostrils 4 times daily as needed   Quantity:  1 Box   Refills:  11            Where to get your medicines      Some of these will need a paper prescription and others can be bought over the counter.  Ask your nurse if you have questions.     Bring a paper prescription for each of these medications     ipratropium 0.06 % spray                Primary Care Provider Office Phone # Fax #    Lyndon Patrick [phone]       [address] [address]        Thank you!     Thank you for choosing [facility]  for your care. Our goal is always to provide you with excellent care. Hearing back from our patients is one way we can continue to improve our services. Please take a few minutes to complete the written survey that you may receive in the mail after your visit with us. Thank you!             Your Updated Medication List - Protect others around you: Learn how to safely use, store and throw away your medicines at www.disposemymeds.org.          This list is accurate as of: 6/8/17  9:21 AM.  Always use your most recent med list.                   Brand Name Dispense Instructions for use    ALLEGRA 180 MG tablet   Generic drug:  fexofenadine      Take 1 tablet by mouth daily As needed       ANTIOXIDANT WITH CO Q-10 PO      Take 1 tablet by mouth daily       aspirin 325 MG EC tablet      Take 325 mg by mouth daily.       atorvastatin 10 MG tablet    LIPITOR    90 tablet    Take 1 tablet (10 mg) by mouth daily       blood glucose monitoring lancets     1 Box    USE 1 DAILY       blood glucose monitoring test strip    ACCU-CHEK SMARTVIEW    100 strip    1 strip by In Vitro route daily       DAILY VITAMINS PO      Take  by  mouth. daily       fluticasone 50 MCG/ACT spray    FLONASE    16 g    Spray 2 sprays into both nostrils daily       ipratropium 0.06 % spray    ATROVENT    1 Box    Spray 2 sprays into both nostrils 4 times daily as needed       terbinafine 250 MG tablet    lamISIL    90 tablet    Take 1 tablet (250 mg) by mouth daily

## 2017-06-08 NOTE — NURSING NOTE
"Chief Complaint   Patient presents with     Sinus Problem     Infections       Initial Resp 16  Ht 1.807 m (5' 11.14\")  Wt 85.7 kg (189 lb)  BMI 26.26 kg/m2 Estimated body mass index is 26.26 kg/(m^2) as calculated from the following:    Height as of this encounter: 1.807 m (5' 11.14\").    Weight as of this encounter: 85.7 kg (189 lb).  Medication Reconciliation: complete     Aliza Blanco MA    "

## 2017-06-08 NOTE — PATIENT INSTRUCTIONS
General Scheduling Information  To schedule your CT/MRI scan, please contact [place] at [phone]   [address] [place] [address]    To schedule your Surgery, please contact our Specialty Schedulers at [phone]    ENT Clinic Locations Clinic Hours Telephone Number     Dionisio Bonner  [address] [place] [address]   Tuesday:       8:00am -- 4:00pm    Wednesday:  8:00am - 4:00pm   To schedule an appointment with   Dr. Ayala,   please contact our   Specialty Scheduling Department at:     [phone]       Dionisio Leger  [address] [place] [address]   Friday:          8:00am - 4:00pm         Urgent Care Locations Clinic Hours Telephone Numbers     Dionisio Fortune  [address] [place] [address]     Monday-Friday:     11:00pm - 9:00pm    Saturday-Sunday:  9:00am - 5:00pm   [phone]     Dionisio Leger  [address] [place] [address]     Monday-Friday:      5:00pm - 9:00pm     Saturday-Sunday:  9:00am - 5:00pm   [phone]

## 2017-06-09 DIAGNOSIS — E11.9 TYPE 2 DIABETES MELLITUS WITHOUT COMPLICATION, WITHOUT LONG-TERM CURRENT USE OF INSULIN (H): Primary | ICD-10-CM

## 2017-06-09 NOTE — TELEPHONE ENCOUNTER
This is a new patient to our pharmacy-- we have not filled testing supplies for him yet. Not sure if we even can fill but we would like to try. Thanks!    accu-chek test strips and lancets         Last Written Prescription Date: ?  Last Fill Quantity: ?, # refills: ?  Last Office Visit with [facility], [facility] or [facility] prescribing provider:  6/5/17        BP Readings from Last 3 Encounters:   06/05/17 136/72   06/13/16 128/62   06/06/14 118/59     Lab Results   Component Value Date    MICROL 5 06/13/2016     Lab Results   Component Value Date    UMALCR 4.37 06/13/2016     Creatinine   Date Value Ref Range Status   06/13/2016 1.01 0.66 - 1.25 mg/dL Final   ]  GFR Estimate   Date Value Ref Range Status   06/13/2016 72 >60 mL/min/1.7m2 Final     Comment:     Non  GFR Calc   10/16/2013 70 >60 mL/min/1.7m2 Final   03/26/2013 71 >60 mL/min/1.7m2 Final     GFR Estimate If Black   Date Value Ref Range Status   06/13/2016 87 >60 mL/min/1.7m2 Final     Comment:      GFR Calc   10/16/2013 84 >60 mL/min/1.7m2 Final   03/26/2013 86 >60 mL/min/1.7m2 Final     Lab Results   Component Value Date    CHOL 163 06/13/2016     Lab Results   Component Value Date    HDL 29 06/13/2016     Lab Results   Component Value Date     06/13/2016     Lab Results   Component Value Date    TRIG 83 06/13/2016     Lab Results   Component Value Date    CHOLHDLRATIO 5.4 10/16/2013     Lab Results   Component Value Date    AST 26 04/06/2011     Lab Results   Component Value Date    ALT 37 04/06/2011     Lab Results   Component Value Date    A1C 6.7 06/13/2016    A1C 5.8 04/22/2014    A1C 6.4 10/16/2013    A1C 6.7 03/26/2013    A1C 6.4 08/12/2011     Potassium   Date Value Ref Range Status   10/16/2013 4.1 3.4 - 5.3 mmol/L Final     Thanks!  Marylin Harden CPhT  [facility]  [phone]

## 2017-06-12 NOTE — TELEPHONE ENCOUNTER
Routing refill request to provider for review/approval because:  A break in medication      Angelique Grant RN - BC

## 2017-06-13 RX ORDER — LANCETS
EACH MISCELLANEOUS
Qty: 1 BOX | Refills: 20 | Status: CANCELLED | OUTPATIENT
Start: 2017-06-13

## 2017-06-20 DIAGNOSIS — E11.65 TYPE 2 DIABETES MELLITUS WITH HYPERGLYCEMIA, WITHOUT LONG-TERM CURRENT USE OF INSULIN (H): ICD-10-CM

## 2017-06-20 LAB
ANION GAP SERPL CALCULATED.3IONS-SCNC: 7 MMOL/L (ref 3–14)
BUN SERPL-MCNC: 33 MG/DL (ref 7–30)
CALCIUM SERPL-MCNC: 9.1 MG/DL (ref 8.5–10.1)
CHLORIDE SERPL-SCNC: 105 MMOL/L (ref 94–109)
CHOLEST SERPL-MCNC: 161 MG/DL
CO2 SERPL-SCNC: 28 MMOL/L (ref 20–32)
CREAT SERPL-MCNC: 1.23 MG/DL (ref 0.66–1.25)
CREAT UR-MCNC: 219 MG/DL
GFR SERPL CREATININE-BSD FRML MDRD: 57 ML/MIN/1.7M2
GLUCOSE SERPL-MCNC: 136 MG/DL (ref 70–99)
HBA1C MFR BLD: 6.8 % (ref 4.3–6)
HDLC SERPL-MCNC: 31 MG/DL
LDLC SERPL CALC-MCNC: 117 MG/DL
MICROALBUMIN UR-MCNC: 10 MG/L
MICROALBUMIN/CREAT UR: 4.34 MG/G CR (ref 0–17)
NONHDLC SERPL-MCNC: 130 MG/DL
POTASSIUM SERPL-SCNC: 4.6 MMOL/L (ref 3.4–5.3)
SODIUM SERPL-SCNC: 140 MMOL/L (ref 133–144)
TRIGL SERPL-MCNC: 65 MG/DL

## 2017-06-20 PROCEDURE — 80061 LIPID PANEL: CPT | Performed by: FAMILY MEDICINE

## 2017-06-20 PROCEDURE — 82043 UR ALBUMIN QUANTITATIVE: CPT | Performed by: FAMILY MEDICINE

## 2017-06-20 PROCEDURE — 36415 COLL VENOUS BLD VENIPUNCTURE: CPT | Performed by: FAMILY MEDICINE

## 2017-06-20 PROCEDURE — 83036 HEMOGLOBIN GLYCOSYLATED A1C: CPT | Performed by: FAMILY MEDICINE

## 2017-06-20 PROCEDURE — 80048 BASIC METABOLIC PNL TOTAL CA: CPT | Performed by: FAMILY MEDICINE

## 2017-06-23 ENCOUNTER — OFFICE VISIT (OUTPATIENT)
Dept: UROLOGY | Facility: CLINIC | Age: 77
End: 2017-06-23
Payer: MEDICARE

## 2017-06-23 VITALS — HEART RATE: 68 BPM | SYSTOLIC BLOOD PRESSURE: 138 MMHG | RESPIRATION RATE: 16 BRPM | DIASTOLIC BLOOD PRESSURE: 80 MMHG

## 2017-06-23 DIAGNOSIS — N40.1 BENIGN NON-NODULAR PROSTATIC HYPERPLASIA WITH LOWER URINARY TRACT SYMPTOMS: Primary | ICD-10-CM

## 2017-06-23 PROCEDURE — 99203 OFFICE O/P NEW LOW 30 MIN: CPT | Mod: 25 | Performed by: UROLOGY

## 2017-06-23 PROCEDURE — 51798 US URINE CAPACITY MEASURE: CPT | Performed by: UROLOGY

## 2017-06-23 RX ORDER — TAMSULOSIN HYDROCHLORIDE 0.4 MG/1
0.4 CAPSULE ORAL AT BEDTIME
Qty: 30 CAPSULE | Refills: 1 | Status: SHIPPED | OUTPATIENT
Start: 2017-06-23 | End: 2017-07-21

## 2017-06-23 NOTE — MR AVS SNAPSHOT
"              After Visit Summary   6/23/2017    Marcos Downing    MRN: [id number]           Patient Information     Date Of Birth          1940        Visit Information        Provider Department      6/23/2017 11:15 AM Refugio Petty MD [facility] [place]        Today's Diagnoses     Benign non-nodular prostatic hyperplasia with lower urinary tract symptoms    -  1       Follow-ups after your visit        Your next 10 appointments already scheduled     Jul 21, 2017 11:30 AM CDT   [facility] Visit with Ref[facility]io [facility]   [facility] ([address]  [place] [address]   [phone]              Who to contact     If you have questions or need follow up information about today's clinic visit or your schedule please contact [facility] directly at [phone].  Normal or non-critical lab and imaging results will be communicated to you by MyChart, letter or phone within 4 business days after the clinic has received the results. If you do not hear from us within 7 days, please contact the clinic through MyChart or phone. If you have a critical or abnormal lab result, we will notify you by phone as soon as possible.  Submit refill requests through [employer] or call your pharmacy and they will forward the refill request to us. Please allow 3 business days for your refill to be completed.          Additional Information About Your Visit        MyChart Information     [employer] lets you send messages to your doctor, view your test results, renew your prescriptions, schedule appointments and more. To sign up, go to [email or web address] . Click on \"Log in\" on the left side of the screen, which will take you to the Welcome page. Then click on \"Sign up Now\" on the right side of the page.     You will be asked to enter the access code listed below, as well as some personal information. Please follow the directions to create your username and " password.     Your access code is: [email or web address]  Expires: 9/3/2017 12:36 PM     Your access code will  in 90 days. If you need help or a new code, please call your [facility] or [phone].        Care EveryWhere ID     This is your Care EveryWhere ID. This could be used by other organizations to access your [facility] medical records  [id number]        Your Vitals Were     Pulse Respirations                68 16           Blood Pressure from Last 3 Encounters:   17 138/80   17 136/72   16 128/62    Weight from Last 3 Encounters:   17 85.7 kg (189 lb)   17 86 kg (189 lb 9.6 oz)   16 85.5 kg (188 lb 8 oz)              We Performed the Following     MEASURE POST-VOID RESIDUAL URINE/BLADDER CAPACITY, US NON-IMAGING          Today's Medication Changes          These changes are accurate as of: 17 11:17 AM.  If you have any questions, ask your nurse or doctor.               Start taking these medicines.        Dose/Directions    tamsulosin 0.4 MG capsule   Commonly known as:  FLOMAX   Used for:  Benign non-nodular prostatic hyperplasia with lower urinary tract symptoms   Started by:  Refugio Petty MD        Dose:  0.4 mg   Take 1 capsule (0.4 mg) by mouth At Bedtime   Quantity:  30 capsule   Refills:  1         Stop taking these medicines if you haven't already. Please contact your care team if you have questions.     atorvastatin 10 MG tablet   Commonly known as:  LIPITOR   Stopped by:  Refugio Petty MD                Where to get your medicines      These medications were sent to [facility] [place], [place] - [address]  [address] [facility] [address]     Phone:  [phone]     tamsulosin 0.4 MG capsule                Primary Care Provider Office Phone # Fax #    Lyndon Patrick [phone]       [address] [address]        Equal Access  to Services     TEJAS [place] : Diana Mohamud, wakianda luqadaha, qaybta kaalmacandido kruse, hamzah wong. So [facility] [phone].    ATENCIÓN: Si habla divya, tiene a francis disposición servicios gratuitos de asistencia lingüística. Llame al [phone].    We comply with applicable federal civil rights laws and [place] laws. We do not discriminate on the basis of race, color, national origin, age, disability sex, sexual orientation or gender identity.            Thank you!     Thank you for choosing [facility]  for your care. Our goal is always to provide you with excellent care. Hearing back from our patients is one way we can continue to improve our services. Please take a few minutes to complete the written survey that you may receive in the mail after your visit with us. Thank you!             Your Updated Medication List - Protect others around you: Learn how to safely use, store and throw away your medicines at www.disposemymeds.org.          This list is accurate as of: 6/23/17 11:17 AM.  Always use your most recent med list.                   Brand Name Dispense Instructions for use Diagnosis    ALLEGRA 180 MG tablet   Generic drug:  fexofenadine      Take 1 tablet by mouth daily As needed        ANTIOXIDANT WITH CO Q-10 PO      Take 1 tablet by mouth daily        aspirin 325 MG EC tablet      Take 325 mg by mouth daily.        blood glucose lancets standard    no brand specified    1 Box    Test once daily    Type 2 diabetes mellitus without complication, without long-term current use of insulin (H)       blood glucose monitoring lancets     1 Box    USE 1 DAILY    Type II or unspecified type diabetes mellitus without mention of complication, not stated as uncontrolled       * blood glucose monitoring test strip    ACCU-CHEK SMARTVIEW    100 strip    1 strip by In Vitro route daily    Type II or unspecified type diabetes mellitus without mention of  complication, not stated as uncontrolled       * blood glucose monitoring test strip    no brand specified    100 strip    Test once daily    Type 2 diabetes mellitus without complication, without long-term current use of insulin (H)       DAILY VITAMINS PO      Take  by mouth. daily        fluticasone 50 MCG/ACT spray    FLONASE    16 g    Spray 2 sprays into both nostrils daily    Chronic maxillary sinusitis       ipratropium 0.06 % spray    ATROVENT    1 Box    Spray 2 sprays into both nostrils 4 times daily as needed    Chronic rhinitis       tamsulosin 0.4 MG capsule    FLOMAX    30 capsule    Take 1 capsule (0.4 mg) by mouth At Bedtime    Benign non-nodular prostatic hyperplasia with lower urinary tract symptoms       terbinafine 250 MG tablet    lamISIL    90 tablet    Take 1 tablet (250 mg) by mouth daily    Fungal infection of toenail       * Notice:  This list has 2 medication(s) that are the same as other medications prescribed for you. Read the directions carefully, and ask your doctor or other care provider to review them with you.

## 2017-06-23 NOTE — NURSING NOTE
".  Chief Complaint   Patient presents with     Consult       Initial /80 (BP Location: Right arm, Patient Position: Chair, Cuff Size: Adult Regular)  Pulse 68  Resp 16 Estimated body mass index is 26.26 kg/(m^2) as calculated from the following:    Height as of 6/8/17: 1.807 m (5' 11.14\").    Weight as of 6/8/17: 85.7 kg (189 lb).  Medication Reconciliation: complete    "

## 2017-06-23 NOTE — PROGRESS NOTES
S: Marcos Downing is a pleasant  77 year old male who was requested to be seen by  Lyndon Patrick for a consult with regard to patient's urinary complaints.  Patient complains of Sensation of incomplete emptying, Nocturia x 2, Urgency and Frequency.  He has no history of elevated PSA.  Symptoms have been on going for   many years(s).  Seems to be worsened over time.  His recent PSA was found to be   PSA   Date Value Ref Range Status   06/13/2016 2.27 0 - 4 ug/L Final   .  His AUA Symptom Score:  21.  His QOL score:  5.    Current Outpatient Prescriptions   Medication Sig Dispense Refill     blood glucose monitoring (NO BRAND SPECIFIED) test strip Test once daily 100 strip 1     blood glucose (NO BRAND SPECIFIED) lancets standard Test once daily 1 Box 1     ipratropium (ATROVENT) 0.06 % spray Spray 2 sprays into both nostrils 4 times daily as needed 1 Box 11     fluticasone (FLONASE) 50 MCG/ACT spray Spray 2 sprays into both nostrils daily 16 g 1     blood glucose (ACCU-CHEK SMARTVIEW) test strip 1 strip by In Vitro route daily 100 strip 0     blood glucose monitoring (SOFTCLIX) lancets USE 1 DAILY 1 Box 20     terbinafine (LAMISIL) 250 MG tablet Take 1 tablet (250 mg) by mouth daily 90 tablet 0     fexofenadine (ALLEGRA) 180 MG tablet Take 1 tablet by mouth daily As needed       Nutritional Supplements (ANTIOXIDANT WITH CO Q-10 PO) Take 1 tablet by mouth daily       Multiple Vitamin (DAILY VITAMINS PO) Take  by mouth. daily       aspirin 325 MG EC tablet Take 325 mg by mouth daily.       Allergies   Allergen Reactions     Niaspan [Niacin]      Flushing.     Past Medical History:   Diagnosis Date     Cataract 1/19/2012     DM type 2, goal A1c below 7      Hyperlipidemia with target LDL less than 100     low HDL     Kidney stone      Pre-diabetes     A1C  6.4 in 8/2011     Past Surgical History:   Procedure Laterality Date     CYSTOSCOPY, DILATE URETHRA, COMBINED     1996     TONSILLECTOMY      T & A  age 3      VASECTOMY  Age 39      Family History   Problem Relation Age of Onset     DIABETES Mother      HEART DISEASE Mother      DIABETES Father      DIABETES Brother      CANCER Brother      CANCER Sister      DIABETES Sister      DIABETES Brother      Glaucoma No family hx of      Macular Degeneration No family hx of      He does not have a family history of prostate cancer.  Social History     Social History     Marital status:      Spouse name: N/A     Number of children: N/A     Years of education: N/A     Social History Main Topics     Smoking status: Former Smoker     Quit date: 4/1/1971     Smokeless tobacco: Never Used     Alcohol use Yes      Comment: special occasions only     Drug use: No     Sexual activity: Yes     Partners: Female     Other Topics Concern     None     Social History Narrative        REVIEW OF SYSTEMS  =================  C: NEGATIVE for fever, chills, change in weight  I: NEGATIVE for worrisome rashes, moles or lesions  E/M: NEGATIVE for ear, mouth and throat problems  R: NEGATIVE for significant cough or SHORTNESS OF BREATH  CV:  NEGATIVE for chest pain, palpitations or peripheral edema  GI: NEGATIVE for nausea, abdominal pain, heartburn, or change in bowel habits  NEURO: NEGATIVE numbness/weakness  : see HPI  PSYCH: NEGATIVE depression/anxiety  LYmph: no new enlarged lymph nodes  Ortho: no new trauma/movements           O: Exam:  Constitutional: healthy, alert and no distress  Head: Normocephalic. No masses, lesions, tenderness or abnormalities  ENT: ENT exam normal, no neck nodes or sinus tenderness  Cardiovascular: negative, PMI normal.   Respiratory: negative, no evidence of respiratory distress  Gastrointestinal: Abdomen soft, non-tender. BS normal. No masses, organomegaly  : penis no d/c. Testis no masses.  No scrotal skin lesion.  Prostate 40 gm flat.  Musculoskeletal: extremities normal- no gross deformities noted, gait normal and normal muscle tone  Skin: no suspicious  lesions or rashes  Neurologic: Alert and oriented  Psychiatric: mentation appears normal. and affect normal/bright  Hematologic/Lymphatic/Immunologic: normal ant/post cervical, axillary, supraclavicular and inguinal nodes    Assessment/Plan:   (N40.1) Benign non-nodular prostatic hyperplasia with lower urinary tract symptoms  (primary encounter diagnosis)  Comment: bladder scan 66 ml  Plan: trial of flomax           Side effects discussed            F/u in one month

## 2017-07-21 ENCOUNTER — OFFICE VISIT (OUTPATIENT)
Dept: UROLOGY | Facility: CLINIC | Age: 77
End: 2017-07-21
Payer: MEDICARE

## 2017-07-21 VITALS — HEART RATE: 76 BPM | RESPIRATION RATE: 16 BRPM | WEIGHT: 189 LBS | BODY MASS INDEX: 26.26 KG/M2

## 2017-07-21 DIAGNOSIS — R39.15 URINARY URGENCY: Primary | ICD-10-CM

## 2017-07-21 PROCEDURE — 99213 OFFICE O/P EST LOW 20 MIN: CPT | Performed by: UROLOGY

## 2017-07-21 NOTE — PROGRESS NOTES
Chief Complaint   Patient presents with     RECHECK     urgency       Marcos Downing is a 77 year old male who presents today for follow up of   Chief Complaint   Patient presents with     RECHECK     urgency    f/u for urgency of urination.  He has tried flomax and it did not help.  He has been doing behavior and physical therapy.  He is not interested in taking anymore medications.    Current Outpatient Prescriptions   Medication Sig Dispense Refill     blood glucose monitoring (NO BRAND SPECIFIED) test strip Test once daily 100 strip 1     blood glucose (NO BRAND SPECIFIED) lancets standard Test once daily 1 Box 1     ipratropium (ATROVENT) 0.06 % spray Spray 2 sprays into both nostrils 4 times daily as needed 1 Box 11     fluticasone (FLONASE) 50 MCG/ACT spray Spray 2 sprays into both nostrils daily 16 g 1     blood glucose (ACCU-CHEK SMARTVIEW) test strip 1 strip by In Vitro route daily 100 strip 0     blood glucose monitoring (SOFTCLIX) lancets USE 1 DAILY 1 Box 20     terbinafine (LAMISIL) 250 MG tablet Take 1 tablet (250 mg) by mouth daily 90 tablet 0     fexofenadine (ALLEGRA) 180 MG tablet Take 1 tablet by mouth daily As needed       Nutritional Supplements (ANTIOXIDANT WITH CO Q-10 PO) Take 1 tablet by mouth daily       Multiple Vitamin (DAILY VITAMINS PO) Take  by mouth. daily       aspirin 325 MG EC tablet Take 325 mg by mouth daily.       Allergies   Allergen Reactions     Niaspan [Niacin]      Flushing.      Past Medical History:   Diagnosis Date     Cataract 1/19/2012     DM type 2, goal A1c below 7      Hyperlipidemia with target LDL less than 100     low HDL     Kidney stone      Pre-diabetes     A1C  6.4 in 8/2011     Past Surgical History:   Procedure Laterality Date     CYSTOSCOPY, DILATE URETHRA, COMBINED     1996     TONSILLECTOMY      T & A  age 3     VASECTOMY  Age 39     Family History   Problem Relation Age of Onset     DIABETES Mother      HEART DISEASE Mother      DIABETES Father       DIABETES Brother      CANCER Brother      CANCER Sister      DIABETES Sister      DIABETES Brother      Glaucoma No family hx of      Macular Degeneration No family hx of      Social History     Social History     Marital status:      Spouse name: N/A     Number of children: N/A     Years of education: N/A     Social History Main Topics     Smoking status: Former Smoker     Quit date: 4/1/1971     Smokeless tobacco: Never Used     Alcohol use Yes      Comment: special occasions only     Drug use: No     Sexual activity: Yes     Partners: Female     Other Topics Concern     None     Social History Narrative       REVIEW OF SYSTEMS  =================  C: NEGATIVE for fever, chills, change in weight  I: NEGATIVE for worrisome rashes, moles or lesions  E/M: NEGATIVE for ear, mouth and throat problems  R: NEGATIVE for significant cough or SHORTNESS OF BREATH,   CV: NEGATIVE for chest pain, palpitations or peripheral edema  GI: NEGATIVE for nausea, abdominal pain, heartburn, or change in bowel habits  NEURO: NEGATIVE any motor/sensory changes  PSYCH: NEGATIVE for recent mood disorder    Physical Exam:  Pulse 76  Resp 16  Wt 85.7 kg (189 lb)  BMI 26.26 kg/m2   Patient is pleasant, in no acute distress, good general condition.  Lung: no evidence of respiratory distress    Abdomen: Soft, nondistended, non tender. No masses. No rebound or guarding.   Exam: no cva tenderness  Skin: Warm and dry.  No redness.  Psych: normal mood and affect  Neuro: alert and oriented    Assessment/Plan:   (R39.15) Urinary urgency  (primary encounter diagnosis)  Comment:  PVR 0 cc  Plan: discussed meds - anticholinergic           He wants to try to control symptoms by himself first            F/u as needed.

## 2017-07-21 NOTE — MR AVS SNAPSHOT
"              After Visit Summary   2017    Marcos Downing    MRN: [id number]           Patient Information     Date Of Birth          1940        Visit Information        Provider Department      2017 11:30 AM Refugio Petty MD [facility]        Today's Diagnoses     Urinary urgency    -  1       Follow-ups after your visit        Who to contact     If you have questions or need follow up information about today's clinic visit or your schedule please contact [facility] directly at [phone].  Normal or non-critical lab and imaging results will be communicated to you by Biglionhart, letter or phone within 4 business days after the clinic has received the results. If you do not hear from us within 7 days, please contact the clinic through Biglionhart or phone. If you have a critical or abnormal lab result, we will notify you by phone as soon as possible.  Submit refill requests through [employer]EXMA or call your pharmacy and they will forward the refill request to us. Please allow 3 business days for your refill to be completed.          Additional Information About Your Visit        MyChart Information     DEXMA lets you send messages to your doctor, view your test results, renew your prescriptions, schedule appointments and more. To sign up, go to [email or web address] . Click on \"Log in\" on the left side of the screen, which will take you to the Welcome page. Then click on \"Sign up Now\" on the right side of the page.     You will be asked to enter the access code listed below, as well as some personal information. Please follow the directions to create your username and password.     Your access code is: [email or web address]  Expires: 9/3/2017 12:36 PM     Your access code will  in 90 days. If you need help or a new code, please call your [facility] or [phone].        Care EveryWhere ID     This is your Care EveryWhere ID. This could be used by other " organizations to access your [place] medical records  [id number]        Your Vitals Were     Pulse Respirations BMI (Body Mass Index)             76 16 26.26 kg/m2          Blood Pressure from Last 3 Encounters:   06/23/17 138/80   06/05/17 136/72   06/13/16 128/62    Weight from Last 3 Encounters:   07/21/17 85.7 kg (189 lb)   06/08/17 85.7 kg (189 lb)   06/05/17 86 kg (189 lb 9.6 oz)              Today, you had the following     No orders found for display       Primary Care Provider Office Phone # Fax #    Lyndon Patrick [phone]       [address] [address]        Equal Access to Services     TEJAS WHATLEY : Hadii jp moreira hadasho Soomaali, waaxda luqadaha, qaybta kaalmada adeegyada, hamzah ruvalcaba hayjames sanders . So [facility] [phone].    ATENCIÓN: Si habla español, tiene a francis disposición servicios gratuitos de asistencia lingüística. Llame al [phone].    We comply with applicable federal civil rights laws and [place] laws. We do not discriminate on the basis of race, color, national origin, age, disability sex, sexual orientation or gender identity.            Thank you!     Thank you for choosing [facility]  for your care. Our goal is always to provide you with excellent care. Hearing back from our patients is one way we can continue to improve our services. Please take a few minutes to complete the written survey that you may receive in the mail after your visit with us. Thank you!             Your Updated Medication List - Protect others around you: Learn how to safely use, store and throw away your medicines at www.disposemymeds.org.          This list is accurate as of: 7/21/17 11:55 AM.  Always use your most recent med list.                   Brand Name Dispense Instructions for use Diagnosis    ALLEGRA 180 MG tablet   Generic drug:  fexofenadine      Take 1 tablet by mouth daily As needed        ANTIOXIDANT  WITH CO Q-10 PO      Take 1 tablet by mouth daily        aspirin 325 MG EC tablet      Take 325 mg by mouth daily.        blood glucose lancets standard    no brand specified    1 Box    Test once daily    Type 2 diabetes mellitus without complication, without long-term current use of insulin (H)       blood glucose monitoring lancets     1 Box    USE 1 DAILY    Type II or unspecified type diabetes mellitus without mention of complication, not stated as uncontrolled       * blood glucose monitoring test strip    ACCU-CHEK SMARTVIEW    100 strip    1 strip by In Vitro route daily    Type II or unspecified type diabetes mellitus without mention of complication, not stated as uncontrolled       * blood glucose monitoring test strip    no brand specified    100 strip    Test once daily    Type 2 diabetes mellitus without complication, without long-term current use of insulin (H)       DAILY VITAMINS PO      Take  by mouth. daily        fluticasone 50 MCG/ACT spray    FLONASE    16 g    Spray 2 sprays into both nostrils daily    Chronic maxillary sinusitis       ipratropium 0.06 % spray    ATROVENT    1 Box    Spray 2 sprays into both nostrils 4 times daily as needed    Chronic rhinitis       terbinafine 250 MG tablet    lamISIL    90 tablet    Take 1 tablet (250 mg) by mouth daily    Fungal infection of toenail       * Notice:  This list has 2 medication(s) that are the same as other medications prescribed for you. Read the directions carefully, and ask your doctor or other care provider to review them with you.

## 2017-07-21 NOTE — NURSING NOTE
"Chief Complaint   Patient presents with     RECHECK     urgency       Initial Pulse 76  Resp 16  Wt 85.7 kg (189 lb)  BMI 26.26 kg/m2 Estimated body mass index is 26.26 kg/(m^2) as calculated from the following:    Height as of 6/8/17: 1.807 m (5' 11.14\").    Weight as of this encounter: 85.7 kg (189 lb).  Medication Reconciliation: complete   Tiffany Dominguez CMA 7/21/2017 11:31 AM        "

## 2018-01-23 ENCOUNTER — OFFICE VISIT (OUTPATIENT)
Dept: OPTOMETRY | Facility: CLINIC | Age: 78
End: 2018-01-23
Payer: MEDICARE

## 2018-01-23 DIAGNOSIS — H52.4 HYPEROPIA OF BOTH EYES WITH ASTIGMATISM AND PRESBYOPIA: ICD-10-CM

## 2018-01-23 DIAGNOSIS — E11.9 TYPE 2 DIABETES MELLITUS WITHOUT RETINOPATHY (H): Primary | ICD-10-CM

## 2018-01-23 DIAGNOSIS — H25.813 COMBINED FORMS OF AGE-RELATED CATARACT OF BOTH EYES: ICD-10-CM

## 2018-01-23 DIAGNOSIS — H52.203 HYPEROPIA OF BOTH EYES WITH ASTIGMATISM AND PRESBYOPIA: ICD-10-CM

## 2018-01-23 DIAGNOSIS — H52.03 HYPEROPIA OF BOTH EYES WITH ASTIGMATISM AND PRESBYOPIA: ICD-10-CM

## 2018-01-23 DIAGNOSIS — H40.003 GLAUCOMA SUSPECT, BILATERAL: ICD-10-CM

## 2018-01-23 DIAGNOSIS — H43.812 POSTERIOR VITREOUS DETACHMENT, LEFT: ICD-10-CM

## 2018-01-23 PROCEDURE — 92014 COMPRE OPH EXAM EST PT 1/>: CPT | Performed by: OPTOMETRIST

## 2018-01-23 PROCEDURE — 92015 DETERMINE REFRACTIVE STATE: CPT | Mod: GY | Performed by: OPTOMETRIST

## 2018-01-23 ASSESSMENT — REFRACTION_MANIFEST
OD_ADD: +2.50
OD_SPHERE: +1.75
OS_ADD: +2.50
OD_CYLINDER: +0.75
OS_SPHERE: +1.50
OS_CYLINDER: +0.75
OS_AXIS: 005
OD_AXIS: 170

## 2018-01-23 ASSESSMENT — REFRACTION_WEARINGRX
OS_CYLINDER: +0.50
OS_AXIS: 005
OS_SPHERE: +1.50
OD_AXIS: 170
OD_SPHERE: +1.75
OD_CYLINDER: +0.75

## 2018-01-23 ASSESSMENT — EXTERNAL EXAM - RIGHT EYE: OD_EXAM: 1+ BROW PTOSIS

## 2018-01-23 ASSESSMENT — VISUAL ACUITY
OS_CC: 20/120
OD_SC: 20/70
OS_CC: 20/20
OD_CC+: -1
CORRECTION_TYPE: GLASSES
OS_CC+: -1
OS_SC: 20/100
OS_SC: 20/200
METHOD: SNELLEN - LINEAR
OD_CC: 20/120
OD_CC: 20/20
OD_SC: 20/200

## 2018-01-23 ASSESSMENT — TONOMETRY
OD_IOP_MMHG: 16
IOP_METHOD: APPLANATION
OS_IOP_MMHG: 18

## 2018-01-23 ASSESSMENT — SLIT LAMP EXAM - LIDS
COMMENTS: 2+ DERMATOCHALASIS - UPPER LID
COMMENTS: 2+ DERMATOCHALASIS - UPPER LID

## 2018-01-23 ASSESSMENT — EXTERNAL EXAM - LEFT EYE: OS_EXAM: 1+ BROW PTOSIS

## 2018-01-23 ASSESSMENT — CUP TO DISC RATIO
OS_RATIO: 0.
OD_RATIO: 0.6

## 2018-01-23 ASSESSMENT — CONF VISUAL FIELD
OD_NORMAL: 1
OS_NORMAL: 1

## 2018-01-23 NOTE — PROGRESS NOTES
Chief Complaint   Patient presents with     Eye Exam For Diabetes     Accompanied by self  Lab Results   Component Value Date    A1C 6.8 06/20/2017    A1C 6.7 06/13/2016    A1C 5.8 04/22/2014    A1C 6.4 10/16/2013    A1C 6.7 03/26/2013       Last Eye Exam: 1 year ago  Dilated Previously: Yes    What are you currently using to see?  glasses    Distance Vision Acuity: Satisfied with vision    Near Vision Acuity: Satisfied with vision while reading  with glasses    Eye Comfort: good  Do you use eye drops? : No  Occupation or Hobbies: retired    Ruchi Youssef, [employer]     Medical, surgical and family histories reviewed and updated 1/23/2018.       OBJECTIVE: See Ophthalmology exam    ASSESSMENT:    ICD-10-CM    1. Type 2 diabetes mellitus without retinopathy (H) E11.9 EYE EXAM (SIMPLE-NONBILLABLE)   2. Glaucoma suspect, bilateral H40.003 EYE EXAM (SIMPLE-NONBILLABLE)     OPHTHALMOLOGY ADULT REFERRAL   3. Combined forms of age-related cataract of both eyes H25.813 EYE EXAM (SIMPLE-NONBILLABLE)   4. Posterior vitreous detachment, left H43.812 EYE EXAM (SIMPLE-NONBILLABLE)   5. Hyperopia of both eyes with astigmatism and presbyopia H52.03 EYE EXAM (SIMPLE-NONBILLABLE)    H52.203 REFRACTION    H52.4       PLAN:  Monitor, Keep blood sugar under control  Sent letter to primary care provider regarding diabetes    Referral to ophthalmology to repeat visual fields and OCT (further testing for glaucoma) to see if there are any changes since last year    Monitor cataracts by having yearly exams.  Wear sunglasses when outside.    A posterior vitreous detachment is nothing to worry about.  You have a jelly like substance that fills the inside of the eye, as you get older, that gel shrinks a little and when it shrinks, it pulls away from the back of the eye.  When it pulls away you can see a floater, as time goes on, the floater may shrink down out of your line of sight and you won't notice it so often.  There is a little  greater risk of developing a retinal detachment since there's nothing pressing against the retina, so if you start to notice flashes of light or sudden vision changes, return to the clinic as soon as possible, otherwise return as needed.    A final glasses prescription was given.  Allow time for adaptation.  The glasses may cause dizziness and affect depth perception for awhile.  Return to clinic 1 year for Comprehensive Vision Exam      Windy Phoenix O.D  [address]  [place]  [address]    [phone]

## 2018-01-23 NOTE — MR AVS SNAPSHOT
After Visit Summary   1/23/2018    Marcos Downing    MRN: [id number]           Patient Information     Date Of Birth          1940        Visit Information        Provider Department      1/23/2018 1:40 PM Windy Phoenix OD [facility]        Today's Diagnoses     Type 2 diabetes mellitus without retinopathy (H)    -  1    Glaucoma suspect, bilateral        Combined forms of age-related cataract of both eyes        Posterior vitreous detachment, left        Hyperopia of both eyes with astigmatism and presbyopia          Care Instructions        Monitor, Keep blood sugar under control  Sent letter to primary care provider regarding diabetes    Referral to ophthalmology to repeat visual fields and OCT (further testing for glaucoma) to see if there are any changes since last year    Monitor cataracts by having yearly exams.  Wear sunglasses when outside.    A posterior vitreous detachment is nothing to worry about.  You have a jelly like substance that fills the inside of the eye, as you get older, that gel shrinks a little and when it shrinks, it pulls away from the back of the eye.  When it pulls away you can see a floater, as time goes on, the floater may shrink down out of your line of sight and you won't notice it so often.  There is a little greater risk of developing a retinal detachment since there's nothing pressing against the retina, so if you start to notice flashes of light or sudden vision changes, return to the clinic as soon as possible, otherwise return as needed.    A final glasses prescription was given.  Allow time for adaptation.  The glasses may cause dizziness and affect depth perception for awhile.  Return to clinic 1 year for Comprehensive Vision Exam      Windy Phoenix O.D  [facility]  [address] [place] [address]. [place]  [address]    [phone]                Follow-ups after your visit        Additional Services     OPHTHALMOLOGY ADULT  "REFERRAL       Your provider has referred you to:  [facility] [place] [phone]   [email or web address]      Please be aware that coverage of these services is subject to the terms and limitations of your health insurance plan.  Call member services at your health plan with any benefit or coverage questions.      Please bring the following to your appointment:  >>   Any x-rays, CTs or MRIs which have been performed.  Contact the facility where they were done to arrange for  prior to your scheduled appointment.  Any new CT, MRI or other procedures ordered by your specialist must be performed at a [facility] facility or coordinated by your clinic's referral office.    >>   List of current medications   >>   This referral request   >>   Any documents/labs given to you for this referral                  Follow-up notes from your care team     Return in about 1 year (around 1/23/2019) for Eye Exam.      Who to contact     If you have questions or need follow up information about today's clinic visit or your schedule please contact [facility] directly at [phone].  Normal or non-critical lab and imaging results will be communicated to you by MyChart, letter or phone within 4 business days after the clinic has received the results. If you do not hear from us within 7 days, please contact the clinic through Health Catalysthart or phone. If you have a critical or abnormal lab result, we will notify you by phone as soon as possible.  Submit refill requests through [employer] or call your pharmacy and they will forward the refill request to us. Please allow 3 business days for your refill to be completed.          Additional Information About Your Visit        Health CatalystharAlumnize Information     [employer] lets you send messages to your doctor, view your test results, renew your prescriptions, schedule appointments and more. To sign up, go to [email or web address] . Click on \"Log in\" on " "the left side of the screen, which will take you to the Welcome page. Then click on \"Sign up Now\" on the right side of the page.     You will be asked to enter the access code listed below, as well as some personal information. Please follow the directions to create your username and password.     Your access code is: [email or web address]  Expires: 2018  2:55 PM     Your access code will  in 90 days. If you need help or a new code, please call your [place] clinic or [phone].        Care EveryWhere ID     This is your Care EveryWhere ID. This could be used by other organizations to access your [place] medical records  [id number]         Blood Pressure from Last 3 Encounters:   17 138/80   17 136/72   16 128/62    Weight from Last 3 Encounters:   17 85.7 kg (189 lb)   17 85.7 kg (189 lb)   17 86 kg (189 lb 9.6 oz)              We Performed the Following     EYE EXAM (SIMPLE-NONBILLABLE)     OPHTHALMOLOGY ADULT REFERRAL     REFRACTION        Primary Care Provider Office Phone # Fax #    Lyndon Patrick [phone] [address] [facility] [address]        Equal Access to Services     [facility]: Hadii aad ku hadasho Soomaali, waaxda luqadaha, qaybta kaalmada adeegyada, waxay peg hayjames sanders . So [facility] [phone].    ATENCIÓN: Si habla español, tiene a francis disposición servicios gratuitos de asistencia lingüística. Llame al [phone].    We comply with applicable federal civil rights laws and [place] laws. We do not discriminate on the basis of race, color, national origin, age, disability, sex, sexual orientation, or gender identity.            Thank you!     Thank you for choosing [facility]  for your care. Our goal is always to provide you with excellent care. Hearing back from our patients is one way we can continue to improve our services. Please take a few minutes to complete the written survey " that you may receive in the mail after your visit with us. Thank you!             Your Updated Medication List - Protect others around you: Learn how to safely use, store and throw away your medicines at www.disposemymeds.org.          This list is accurate as of: 1/23/18  2:56 PM.  Always use your most recent med list.                   Brand Name Dispense Instructions for use Diagnosis    ALLEGRA 180 MG tablet   Generic drug:  fexofenadine      Take 1 tablet by mouth daily As needed        ANTIOXIDANT WITH CO Q-10 PO      Take 1 tablet by mouth daily        aspirin 325 MG EC tablet      Take 325 mg by mouth daily.        blood glucose lancets standard    no brand specified    1 Box    Test once daily    Type 2 diabetes mellitus without complication, without long-term current use of insulin (H)       blood glucose monitoring lancets     1 Box    USE 1 DAILY    Type II or unspecified type diabetes mellitus without mention of complication, not stated as uncontrolled       * blood glucose monitoring test strip    ACCU-CHEK SMARTVIEW    100 strip    1 strip by In Vitro route daily    Type II or unspecified type diabetes mellitus without mention of complication, not stated as uncontrolled       * blood glucose monitoring test strip    no brand specified    100 strip    Test once daily    Type 2 diabetes mellitus without complication, without long-term current use of insulin (H)       DAILY VITAMINS PO      Take  by mouth. daily        fluticasone 50 MCG/ACT spray    FLONASE    16 g    Spray 2 sprays into both nostrils daily    Chronic maxillary sinusitis       ipratropium 0.06 % spray    ATROVENT    1 Box    Spray 2 sprays into both nostrils 4 times daily as needed    Chronic rhinitis       terbinafine 250 MG tablet    lamISIL    90 tablet    Take 1 tablet (250 mg) by mouth daily    Fungal infection of toenail       * Notice:  This list has 2 medication(s) that are the same as other medications prescribed for you. Read  the directions carefully, and ask your doctor or other care provider to review them with you.

## 2018-01-23 NOTE — LETTER
1/23/2018         RE: Marcos Downing  [address] [facility] [address]        Dear Colleague,    Thank you for referring your patient, Marcos Downing, to the [facility]. Please see a copy of my visit note below.    Chief Complaint   Patient presents with     Eye Exam For Diabetes     Accompanied by self  Lab Results   Component Value Date    A1C 6.8 06/20/2017    A1C 6.7 06/13/2016    A1C 5.8 04/22/2014    A1C 6.4 10/16/2013    A1C 6.7 03/26/2013       Last Eye Exam: 1 year ago  Dilated Previously: Yes    What are you currently using to see?  glasses    Distance Vision Acuity: Satisfied with vision    Near Vision Acuity: Satisfied with vision while reading  with glasses    Eye Comfort: good  Do you use eye drops? : No  Occupation or Hobbies: retired    Ruchi Youssef, [employer]     Medical, surgical and family histories reviewed and updated 1/23/2018.       OBJECTIVE: See Ophthalmology exam    ASSESSMENT:    ICD-10-CM    1. Type 2 diabetes mellitus without retinopathy (H) E11.9 EYE EXAM (SIMPLE-NONBILLABLE)   2. Glaucoma suspect, bilateral H40.003 EYE EXAM (SIMPLE-NONBILLABLE)     OPHTHALMOLOGY ADULT REFERRAL   3. Combined forms of age-related cataract of both eyes H25.813 EYE EXAM (SIMPLE-NONBILLABLE)   4. Posterior vitreous detachment, left H43.812 EYE EXAM (SIMPLE-NONBILLABLE)   5. Hyperopia of both eyes with astigmatism and presbyopia H52.03 EYE EXAM (SIMPLE-NONBILLABLE)    H52.203 REFRACTION    H52.4       PLAN:  Monitor, Keep blood sugar under control  Sent letter to primary care provider regarding diabetes    Referral to ophthalmology to repeat visual fields and OCT (further testing for glaucoma) to see if there are any changes since last year    Monitor cataracts by having yearly exams.  Wear sunglasses when outside.    A posterior vitreous detachment is nothing to worry about.  You have a jelly like substance that fills the inside of the eye, as you get older,  that gel shrinks a little and when it shrinks, it pulls away from the back of the eye.  When it pulls away you can see a floater, as time goes on, the floater may shrink down out of your line of sight and you won't notice it so often.  There is a little greater risk of developing a retinal detachment since there's nothing pressing against the retina, so if you start to notice flashes of light or sudden vision changes, return to the clinic as soon as possible, otherwise return as needed.    A final glasses prescription was given.  Allow time for adaptation.  The glasses may cause dizziness and affect depth perception for awhile.  Return to clinic 1 year for Comprehensive Vision Exam      Windy Phoenix O.D  [address] [place]  [address] [phone]            Again, thank you for allowing me to participate in the care of your patient.        Sincerely,        Windy Phoenix, MIGUEL

## 2018-01-23 NOTE — Clinical Note
Gabo Castillo, This this a glaucoma suspect patient that you've seen in the past.  He had visual fields and OCT done last year, but there were some areas of depression in both eyes, so I thought maybe we should repeat the tests again.   He has an appointment set up with you on 2/22/18. Thanks, Windy

## 2018-02-22 ENCOUNTER — OFFICE VISIT (OUTPATIENT)
Dept: OPHTHALMOLOGY | Facility: CLINIC | Age: 78
End: 2018-02-22
Payer: MEDICARE

## 2018-02-22 DIAGNOSIS — H40.003 GLAUCOMA SUSPECT, BILATERAL: Primary | ICD-10-CM

## 2018-02-22 PROCEDURE — 92083 EXTENDED VISUAL FIELD XM: CPT | Performed by: OPHTHALMOLOGY

## 2018-02-22 PROCEDURE — 92133 CPTRZD OPH DX IMG PST SGM ON: CPT | Performed by: OPHTHALMOLOGY

## 2018-02-22 PROCEDURE — 92012 INTRM OPH EXAM EST PATIENT: CPT | Performed by: OPHTHALMOLOGY

## 2018-02-22 ASSESSMENT — VISUAL ACUITY
OD_PH_SC: 20/40-1
OD_CC: 20/30
METHOD: SNELLEN - LINEAR
CORRECTION_TYPE: GLASSES
OS_SC: 20/70
OS_CC: 20/30
OD_SC: 20/70
OS_PH_SC: 20/40-1
OS_SC+: -2

## 2018-02-22 ASSESSMENT — SLIT LAMP EXAM - LIDS
COMMENTS: 2+ DERMATOCHALASIS - UPPER LID
COMMENTS: 2+ DERMATOCHALASIS - UPPER LID

## 2018-02-22 ASSESSMENT — EXTERNAL EXAM - LEFT EYE: OS_EXAM: 1+ BROW PTOSIS

## 2018-02-22 ASSESSMENT — TONOMETRY
OD_IOP_MMHG: 16
IOP_METHOD: APPLANATION
OS_IOP_MMHG: 22

## 2018-02-22 ASSESSMENT — EXTERNAL EXAM - RIGHT EYE: OD_EXAM: 1+ BROW PTOSIS

## 2018-02-22 NOTE — PATIENT INSTRUCTIONS
Continue observation with regard to glaucoma suspect status.  Call in September 2018 for an appointment in January 2019 for Complete Exam.    Dr. Castillo [phone]

## 2018-02-22 NOTE — PROGRESS NOTES
Current Eye Medications:  None.      Subjective:  Dr. Phoenix recommended he return to see Dr. Castillo for an OCT and visual field, regarding glaucoma suspicion.   No vision changes or concerns - distance vision is good without correction.    Last eye exam:  1-23-18 with Dr. Phoenix.       Objective:  See Ophthalmology Exam.       Assessment:  Stable intraocular pressure, glaucoma OCT, and [place] Visual Field both eyes in patient who is a glaucoma suspect.      Plan:  Continue observation with regard to glaucoma suspect status.  Call in September 2018 for an appointment in January 2019 for Complete Exam.    Dr. Castillo [phone]

## 2018-02-22 NOTE — MR AVS SNAPSHOT
"              After Visit Summary   2/22/2018    Marcos Downing    MRN: [id number]           Patient Information     Date Of Birth          1940        Visit Information        Provider Department      2/22/2018 1:15 PM Regis Castillo MD [facility]        Today's Diagnoses     Glaucoma suspect, bilateral    -  1      Care Instructions    Continue observation with regard to glaucoma suspect status.  Call in September 2018 for an appointment in January 2019 for Complete Exam.    Dr. Castillo [phone]          Follow-ups after your visit        Who to contact     If you have questions or need follow up information about today's clinic visit or your schedule please contact [facility] directly at [phone].  Normal or non-critical lab and imaging results will be communicated to you by MyChart, letter or phone within 4 business days after the clinic has received the results. If you do not hear from us within 7 days, please contact the clinic through Access Intelligencehart or phone. If you have a critical or abnormal lab result, we will notify you by phone as soon as possible.  Submit refill requests through katena or call your pharmacy and they will forward the refill request to us. Please allow 3 business days for your refill to be completed.          Additional Information About Your Visit        Access IntelligenceharRedT Information     katena lets you send messages to your doctor, view your test results, renew your prescriptions, schedule appointments and more. To sign up, go to [email or web address] . Click on \"Log in\" on the left side of the screen, which will take you to the Welcome page. Then click on \"Sign up Now\" on the right side of the page.     You will be asked to enter the access code listed below, as well as some personal information. Please follow the directions to create your username and password.     Your access code is: [email or web address]  Expires: 4/23/2018  2:55 PM     Your access code " will  in 90 days. If you need help or a new code, please call your [place] clinic or [phone].        Care EveryWhere ID     This is your Care EveryWhere ID. This could be used by other organizations to access your [place] medical records  [id number]         Blood Pressure from Last 3 Encounters:   17 138/80   17 136/72   16 128/62    Weight from Last 3 Encounters:   17 85.7 kg (189 lb)   17 85.7 kg (189 lb)   17 86 kg (189 lb 9.6 oz)              We Performed the Following      COMPUTERIZED OPHTHALMIC IMAGING OPTIC NERVE     VISUAL FIELDS EXAM (EXTENDED)        Primary Care Provider Office Phone # Fax #    Lyndon Patrick [phone] [address] [facility] [address]        Equal Access to Services     [facility]: Hadii aad ku hadasho Soomaali, waaxda luqadaha, qaybta kaalmada adeegyada, waxay idiin hayaan elsie garcíaarachay sadlern . So [facility] [phone].    ATENCIÓN: Si habla español, tiene a francis disposición servicios gratuitos de asistencia lingüística. Robert al [phone].    We comply with applicable federal civil rights laws and [place] laws. We do not discriminate on the basis of race, color, national origin, age, disability, sex, sexual orientation, or gender identity.            Thank you!     Thank you for choosing [facility]  for your care. Our goal is always to provide you with excellent care. Hearing back from our patients is one way we can continue to improve our services. Please take a few minutes to complete the written survey that you may receive in the mail after your visit with us. Thank you!             Your Updated Medication List - Protect others around you: Learn how to safely use, store and throw away your medicines at www.disposemymeds.org.          This list is accurate as of 18  2:21 PM.  Always use your most recent med list.                   Brand Name Dispense Instructions for  use Diagnosis    ALLEGRA 180 MG tablet   Generic drug:  fexofenadine      Take 1 tablet by mouth daily As needed        ANTIOXIDANT WITH CO Q-10 PO      Take 1 tablet by mouth daily        aspirin 325 MG EC tablet      Take 325 mg by mouth daily.        blood glucose lancets standard    no brand specified    1 Box    Test once daily    Type 2 diabetes mellitus without complication, without long-term current use of insulin (H)       blood glucose monitoring lancets     1 Box    USE 1 DAILY    Type II or unspecified type diabetes mellitus without mention of complication, not stated as uncontrolled       * blood glucose monitoring test strip    ACCU-CHEK SMARTVIEW    100 strip    1 strip by In Vitro route daily    Type II or unspecified type diabetes mellitus without mention of complication, not stated as uncontrolled       * blood glucose monitoring test strip    no brand specified    100 strip    Test once daily    Type 2 diabetes mellitus without complication, without long-term current use of insulin (H)       DAILY VITAMINS PO      Take  by mouth. daily        fluticasone 50 MCG/ACT spray    FLONASE    16 g    Spray 2 sprays into both nostrils daily    Chronic maxillary sinusitis       ipratropium 0.06 % spray    ATROVENT    1 Box    Spray 2 sprays into both nostrils 4 times daily as needed    Chronic rhinitis       terbinafine 250 MG tablet    lamISIL    90 tablet    Take 1 tablet (250 mg) by mouth daily    Fungal infection of toenail       * Notice:  This list has 2 medication(s) that are the same as other medications prescribed for you. Read the directions carefully, and ask your doctor or other care provider to review them with you.

## 2018-02-24 ASSESSMENT — PACHYMETRY
OS_CT(UM): 578
OD_CT(UM): 598

## 2018-05-23 NOTE — PROGRESS NOTES
SUBJECTIVE:   Marcos Downing is a 78 year old male who presents to clinic today for the following health issues:    RESPIRATORY SYMPTOMS      Duration: states off and on all Winter    Description  facial pain/pressure, ear pain bilateral, lower jaw pain and 4 bottom front teeth hurt    Severity: above moderate    Accompanying signs and symptoms: bloody nose off and on    History (predisposing factors):  History of recurrent sinus infections    Precipitating or alleviating factors: None    Therapies tried and outcome:  Aspirin which has helped      Toothache in the lower front teeth for the last few days. Sees dentist regularly & o dental [facility].  Chronic rhinitis- saw ENT and felt this was vasomotor rhinitis.    Problem list and histories reviewed & adjusted, as indicated.  Additional history: as documented    Patient Active Problem List   Diagnosis     Hyperlipidemia with target LDL less than 100     Type 2 diabetes mellitus with hemoglobin A1c goal of less than 7.0% (H)     Cataracts, both eyes     Type 2 diabetes mellitus without retinopathy (H)     Glaucoma suspect, bilateral     Posterior vitreous detachment, left     Past Surgical History:   Procedure Laterality Date     CYSTOSCOPY, DILATE URETHRA, COMBINED     1996     TONSILLECTOMY      T & A  age 3     VASECTOMY  Age 39       Social History   Substance Use Topics     Smoking status: Former Smoker     Quit date: 4/1/1971     Smokeless tobacco: Never Used     Alcohol use Yes      Comment: special occasions only     Family History   Problem Relation Age of Onset     DIABETES Mother      HEART DISEASE Mother      DIABETES Father      DIABETES Brother      CANCER Brother      CANCER Sister      DIABETES Sister      DIABETES Brother      Glaucoma No family hx of      Macular Degeneration No family hx of            Reviewed and updated as needed this visit by clinical staff       Reviewed and updated as needed this visit by Provider      "    ROS:  Constitutional, HEENT, cardiovascular, pulmonary systems are negative, except as otherwise noted.    OBJECTIVE:     /60 (BP Location: Left arm, Patient Position: Chair, Cuff Size: Adult Regular)  Pulse 68  Temp 97.3  F (36.3  C) (Oral)  Ht 5' 11.14\" (1.807 m)  Wt 189 lb (85.7 kg)  SpO2 97%  BMI 26.26 kg/m2  Body mass index is 26.26 kg/(m^2).  GENERAL: healthy, alert and no distress  EYES: Eyes grossly normal to inspection, PERRL and conjunctivae and sclerae normal  HENT: normal cephalic/atraumatic, ear canals and TM's normal, nose and mouth without ulcers or lesions, oropharynx clear, oral mucous membranes moist, sinuses: not tender and good dentition, normal gums- nontender  NECK: no adenopathy, no asymmetry, masses, or scars and thyroid normal to palpation  RESP: lungs clear to auscultation - no rales, rhonchi or wheezes  CV: regular rate and rhythm, normal S1 S2, no S3 or S4, no murmur, click or rub, no peripheral edema and peripheral pulses strong    Diagnostic Test Results:  none     ASSESSMENT/PLAN:       1. Acute recurrent maxillary sinusitis  If dental pain does not resolve with antibiotics, follow up with dentist.    - amoxicillin-clavulanate (AUGMENTIN) 875-125 MG per tablet; Take 1 tablet by mouth 2 times daily  Dispense: 20 tablet; Refill: 0  - fluticasone (FLONASE) 50 MCG/ACT spray; Spray 2 sprays into both nostrils daily  Dispense: 16 g; Refill: 1    2. Chronic rhinitis, unspecified type  Continue Flonase. Offered referral to allergy for testing and patient declines at this time.    - fluticasone (FLONASE) 50 MCG/ACT spray; Spray 2 sprays into both nostrils daily  Dispense: 16 g; Refill: 1    Follow up as needed    JACY Shipman [facility]    "

## 2018-05-24 ENCOUNTER — OFFICE VISIT (OUTPATIENT)
Dept: FAMILY MEDICINE | Facility: CLINIC | Age: 78
End: 2018-05-24
Payer: MEDICARE

## 2018-05-24 VITALS
SYSTOLIC BLOOD PRESSURE: 136 MMHG | BODY MASS INDEX: 26.46 KG/M2 | OXYGEN SATURATION: 97 % | WEIGHT: 189 LBS | HEART RATE: 68 BPM | HEIGHT: 71 IN | TEMPERATURE: 97.3 F | DIASTOLIC BLOOD PRESSURE: 60 MMHG

## 2018-05-24 DIAGNOSIS — J31.0 CHRONIC RHINITIS, UNSPECIFIED TYPE: ICD-10-CM

## 2018-05-24 DIAGNOSIS — J01.01 ACUTE RECURRENT MAXILLARY SINUSITIS: Primary | ICD-10-CM

## 2018-05-24 PROCEDURE — 99213 OFFICE O/P EST LOW 20 MIN: CPT | Performed by: NURSE PRACTITIONER

## 2018-05-24 RX ORDER — FLUTICASONE PROPIONATE 50 MCG
2 SPRAY, SUSPENSION (ML) NASAL DAILY
Qty: 16 G | Refills: 1 | Status: SHIPPED | OUTPATIENT
Start: 2018-05-24 | End: 2019-09-24

## 2018-05-24 NOTE — PATIENT INSTRUCTIONS
[facility]    If you have any questions regarding to your visit please contact your care team:       Team Red:   Clinic Hours Telephone Number   Dr. Debi Gallo, NP   7am-7pm  Monday - Thursday   7am-5pm  Fridays  [phone]  (Appointment scheduling available 24/7)    Questions about your recent visit?   Team Line  [phone]   Urgent Care - [place] and [facility] - 11am-9pm Monday-Friday Saturday-Sunday- 9am-5pm   [place] - 5pm-9pm Monday-Friday Saturday-Sunday- 9am-5pm  [phone] - [place]  [phone] - [place]       What options do I have for a visit other than an office visit? We offer electronic visits (e-visits) and telephone visits, when medically appropriate.  Please check with your medical insurance to see if these types of visits are covered, as you will be responsible for any charges that are not paid by your insurance.      You can use [employer] (secure electronic communication) to access to your chart, send your primary care provider a message, or make an appointment. Ask a team member how to get started.     For a price quote for your services, please call our Consumer Price Line at [phone] or our Imaging Cost estimation line at [phone] (for imaging tests).      Discharged by Gretta Cleaning MA.

## 2018-05-24 NOTE — MR AVS SNAPSHOT
After Visit Summary   5/24/2018    Marcos Downing    MRN: [id number]           Patient Information     Date Of Birth          1940        Visit Information        Provider Department      5/24/2018 10:40 AM Bre Gallo APRN CNP [facility]        Today's Diagnoses     Acute recurrent maxillary sinusitis    -  1    Chronic rhinitis, unspecified type          Care Instructions    [facility]    If you have any questions regarding to your visit please contact your care team:       Team Red:   Clinic Hours Telephone Number   Dr. Debi Gallo, NP   7am-7pm  Monday - Thursday   7am-5pm  Fridays  [phone]  (Appointment scheduling available 24/7)    Questions about your recent visit?   Team Line  [phone]   Urgent Care - [place] and [facility] - 11am-9pm Monday-Friday Saturday-Sunday- 9am-5pm   [place] - 5pm-9pm Monday-Friday Saturday-Sunday- 9am-5pm  [phone] - [place]  [phone] - [place]       What options do I have for a visit other than an office visit? We offer electronic visits (e-visits) and telephone visits, when medically appropriate.  Please check with your medical insurance to see if these types of visits are covered, as you will be responsible for any charges that are not paid by your insurance.      You can use [employer] (secure electronic communication) to access to your chart, send your primary care provider a message, or make an appointment. Ask a team member how to get started.     For a price quote for your services, please call our Consumer Price Line at [phone] or our Imaging Cost estimation line at [phone] (for imaging tests).      Discharged by Gretta Cleaning MA.            Follow-ups after your visit        Who to contact     If you have questions or need follow up information about today's clinic visit or your schedule please contact  "[facility] directly at [phone].  Normal or non-critical lab and imaging results will be communicated to you by MyChart, letter or phone within 4 business days after the clinic has received the results. If you do not hear from us within 7 days, please contact the clinic through MyChart or phone. If you have a critical or abnormal lab result, we will notify you by phone as soon as possible.  Submit refill requests through True Office or call your pharmacy and they will forward the refill request to us. Please allow 3 business days for your refill to be completed.          Additional Information About Your Visit        Care EveryWhere ID     This is your Care EveryWhere ID. This could be used by other organizations to access your [place] medical records  [id number]        Your Vitals Were     Pulse Temperature Height Pulse Oximetry BMI (Body Mass Index)       68 97.3  F (36.3  C) (Oral) 5' 11.14\" (1.807 m) 97% 26.26 kg/m2        Blood Pressure from Last 3 Encounters:   05/24/18 136/60   06/23/17 138/80   06/05/17 136/72    Weight from Last 3 Encounters:   05/24/18 189 lb (85.7 kg)   07/21/17 189 lb (85.7 kg)   06/08/17 189 lb (85.7 kg)              Today, you had the following     No orders found for display         Today's Medication Changes          These changes are accurate as of 5/24/18 10:53 AM.  If you have any questions, ask your nurse or doctor.               Start taking these medicines.        Dose/Directions    amoxicillin-clavulanate 875-125 MG per tablet   Commonly known as:  AUGMENTIN   Used for:  Acute recurrent maxillary sinusitis   Started by:  Bre Gallo APRN CNP        Dose:  1 tablet   Take 1 tablet by mouth 2 times daily   Quantity:  20 tablet   Refills:  0            Where to get your medicines      These medications were sent to [facility] [place], [place] - [address]  [address], [facility] [address]     " Phone:  [phone]     amoxicillin-clavulanate 875-125 MG per tablet    fluticasone 50 MCG/ACT spray                Primary Care Provider Office Phone # Fax #    Lyndon Patrick [phone]       [address] [facility] [address]        Equal Access to Services     [facility]: Hadii aad ku hadasho Soomaali, waaxda luqadaha, qaybta kaalmada adeegyada, waxay idiin hayaan adeeg adrienne lamaggien . So [facility] [place] [facility] [phone].    ATENCIÓN: Si habla español, tiene a francis disposición servicios gratuitos de asistencia lingüística. Robert al [phone].    We comply with applicable federal civil rights laws and [place] laws. We do not discriminate on the basis of race, color, national origin, age, disability, sex, sexual orientation, or gender identity.            Thank you!     Thank you for choosing [facility]  for your care. Our goal is always to provide you with excellent care. Hearing back from our patients is one way we can continue to improve our services. Please take a few minutes to complete the written survey that you may receive in the mail after your visit with us. Thank you!             Your Updated Medication List - Protect others around you: Learn how to safely use, store and throw away your medicines at www.disposemymeds.org.          This list is accurate as of 5/24/18 10:53 AM.  Always use your most recent med list.                   Brand Name Dispense Instructions for use Diagnosis    ALLEGRA 180 MG tablet   Generic drug:  fexofenadine      Take 1 tablet by mouth daily As needed        amoxicillin-clavulanate 875-125 MG per tablet    AUGMENTIN    20 tablet    Take 1 tablet by mouth 2 times daily    Acute recurrent maxillary sinusitis       ANTIOXIDANT WITH CO Q-10 PO      Take 1 tablet by mouth daily        aspirin 325 MG EC tablet      Take 325 mg by mouth daily.        blood glucose lancets standard    no brand specified    1 Box    Test once daily    Type 2  diabetes mellitus without complication, without long-term current use of insulin (H)       blood glucose monitoring lancets     1 Box    USE 1 DAILY    Type II or unspecified type diabetes mellitus without mention of complication, not stated as uncontrolled       * blood glucose monitoring test strip    ACCU-CHEK SMARTVIEW    100 strip    1 strip by In Vitro route daily    Type II or unspecified type diabetes mellitus without mention of complication, not stated as uncontrolled       * blood glucose monitoring test strip    no brand specified    100 strip    Test once daily    Type 2 diabetes mellitus without complication, without long-term current use of insulin (H)       DAILY VITAMINS PO      Take  by mouth. daily        fluticasone 50 MCG/ACT spray    FLONASE    16 g    Spray 2 sprays into both nostrils daily    Acute recurrent maxillary sinusitis, Chronic rhinitis, unspecified type       * Notice:  This list has 2 medication(s) that are the same as other medications prescribed for you. Read the directions carefully, and ask your doctor or other care provider to review them with you.

## 2018-07-02 ENCOUNTER — OFFICE VISIT (OUTPATIENT)
Dept: FAMILY MEDICINE | Facility: CLINIC | Age: 78
End: 2018-07-02
Payer: MEDICARE

## 2018-07-02 VITALS
BODY MASS INDEX: 26.4 KG/M2 | OXYGEN SATURATION: 97 % | HEART RATE: 75 BPM | DIASTOLIC BLOOD PRESSURE: 60 MMHG | WEIGHT: 188.6 LBS | SYSTOLIC BLOOD PRESSURE: 124 MMHG | TEMPERATURE: 97 F | RESPIRATION RATE: 12 BRPM | HEIGHT: 71 IN

## 2018-07-02 DIAGNOSIS — E11.65 TYPE 2 DIABETES MELLITUS WITH HYPERGLYCEMIA, WITHOUT LONG-TERM CURRENT USE OF INSULIN (H): ICD-10-CM

## 2018-07-02 DIAGNOSIS — Z12.11 COLON CANCER SCREENING: ICD-10-CM

## 2018-07-02 DIAGNOSIS — Z00.00 MEDICARE ANNUAL WELLNESS VISIT, SUBSEQUENT: Primary | ICD-10-CM

## 2018-07-02 DIAGNOSIS — E78.5 HYPERLIPIDEMIA LDL GOAL <100: ICD-10-CM

## 2018-07-02 DIAGNOSIS — Z13.89 SCREENING FOR DIABETIC PERIPHERAL NEUROPATHY: ICD-10-CM

## 2018-07-02 DIAGNOSIS — Z23 NEED FOR PROPHYLACTIC VACCINATION AGAINST STREPTOCOCCUS PNEUMONIAE (PNEUMOCOCCUS): ICD-10-CM

## 2018-07-02 LAB
ANION GAP SERPL CALCULATED.3IONS-SCNC: 7 MMOL/L (ref 3–14)
BUN SERPL-MCNC: 27 MG/DL (ref 7–30)
CALCIUM SERPL-MCNC: 9.4 MG/DL (ref 8.5–10.1)
CHLORIDE SERPL-SCNC: 105 MMOL/L (ref 94–109)
CHOLEST SERPL-MCNC: 193 MG/DL
CO2 SERPL-SCNC: 27 MMOL/L (ref 20–32)
CREAT SERPL-MCNC: 1.19 MG/DL (ref 0.66–1.25)
CREAT UR-MCNC: 90 MG/DL
GFR SERPL CREATININE-BSD FRML MDRD: 59 ML/MIN/1.7M2
GLUCOSE SERPL-MCNC: 126 MG/DL (ref 70–99)
HBA1C MFR BLD: 7.3 % (ref 0–5.6)
HDLC SERPL-MCNC: 34 MG/DL
LDLC SERPL CALC-MCNC: 136 MG/DL
MICROALBUMIN UR-MCNC: <5 MG/L
MICROALBUMIN/CREAT UR: NORMAL MG/G CR (ref 0–17)
NONHDLC SERPL-MCNC: 159 MG/DL
POTASSIUM SERPL-SCNC: 4.5 MMOL/L (ref 3.4–5.3)
SODIUM SERPL-SCNC: 139 MMOL/L (ref 133–144)
TRIGL SERPL-MCNC: 113 MG/DL
TSH SERPL DL<=0.005 MIU/L-ACNC: 1.58 MU/L (ref 0.4–4)

## 2018-07-02 PROCEDURE — 80061 LIPID PANEL: CPT | Performed by: FAMILY MEDICINE

## 2018-07-02 PROCEDURE — 99213 OFFICE O/P EST LOW 20 MIN: CPT | Mod: 25 | Performed by: FAMILY MEDICINE

## 2018-07-02 PROCEDURE — 84443 ASSAY THYROID STIM HORMONE: CPT | Performed by: FAMILY MEDICINE

## 2018-07-02 PROCEDURE — 80048 BASIC METABOLIC PNL TOTAL CA: CPT | Performed by: FAMILY MEDICINE

## 2018-07-02 PROCEDURE — 99207 C FOOT EXAM  NO CHARGE: CPT | Mod: 25 | Performed by: FAMILY MEDICINE

## 2018-07-02 PROCEDURE — 83036 HEMOGLOBIN GLYCOSYLATED A1C: CPT | Performed by: FAMILY MEDICINE

## 2018-07-02 PROCEDURE — 82043 UR ALBUMIN QUANTITATIVE: CPT | Performed by: FAMILY MEDICINE

## 2018-07-02 PROCEDURE — G0439 PPPS, SUBSEQ VISIT: HCPCS | Performed by: FAMILY MEDICINE

## 2018-07-02 PROCEDURE — 36415 COLL VENOUS BLD VENIPUNCTURE: CPT | Performed by: FAMILY MEDICINE

## 2018-07-02 NOTE — MR AVS SNAPSHOT
After Visit Summary   7/2/2018    Marcos Downing    MRN: [id number]           Patient Information     Date Of Birth          1940        Visit Information        Provider Department      7/2/2018 1:40 PM Lyndon Patrick MD [facility]        Today's Diagnoses     Medicare annual wellness visit, subsequent    -  1    Type 2 diabetes mellitus with hyperglycemia, without long-term current use of insulin (H)        Hyperlipidemia LDL goal <100        Screening for diabetic peripheral neuropathy        Need for prophylactic vaccination against Streptococcus pneumoniae (pneumococcus)        Colon cancer screening          Care Instructions    [facility]    If you have any questions regarding to your visit please contact your care team:       Team Purple:   Clinic Hours Telephone Number   Dr. Cate Overton   7am-7pm  Monday - Thursday   7am-5pm  Fridays  [phone]  (Appointment scheduling available 24/7)    Questions about your recent visit?   Team Line:  [phone]   Urgent Care - [place] and [facility] - 11am-9pm Monday-Friday Saturday-Sunday- 9am-5pm   [place] - 5pm-9pm Monday-Friday Saturday-Sunday- 9am-5pm  [phone] - [place]  [phone] [facility]       What options do I have for a visit other than an office visit? We offer electronic visits (e-visits) and telephone visits, when medically appropriate.  Please check with your medical insurance to see if these types of visits are covered, as you will be responsible for any charges that are not paid by your insurance.      You can use [email or web address] (secure electronic communication) to access to your chart, send your primary care provider a message, or make an appointment. Ask a team member how to get started.     For a price quote for your services, please call our Consumer Price Line at [phone] or our Imaging Cost estimation line at  [phone] (for imaging tests).    Matthew Diallo    Preventive Health Recommendations:   Male Ages 65 and over  Yearly exam:             See your health care provider every year in order to  o   Review health changes.   o   Discuss preventive care.    o   Review your medicines if your doctor has prescribed any.    Talk with your health care provider about whether you should have a test to screen for prostate cancer (PSA).    Every 3 years, have a diabetes test (fasting glucose). If you are at risk for diabetes, you should have this test more often.    Every 5 years, have a cholesterol test. Have this test more often if you are at risk for high cholesterol or heart disease.     Every 10 years, have a colonoscopy. Or, have a yearly FIT test (stool test). These exams will check for colon cancer.    Talk to with your health care provider about screening for Abdominal Aortic Aneurysm if you have a family history of AAA or have a history of smoking.    Shots:     Get a flu shot each year.     Get a tetanus shot every 10 years.     Talk to your doctor about your pneumonia vaccines. There are now two you should receive - Pneumovax (PPSV 23) and Prevnar (PCV 13).     Talk to your pharmacist about a shingles vaccine.     Talk to your doctor about the hepatitis B vaccine.  Nutrition:     Eat at least 5 servings of fruits and vegetables each day.     Eat whole-grain bread, whole-wheat pasta and brown rice instead of white grains and rice.     Get adequate Calcium and Vitamin D.   Lifestyle    Exercise for at least 150 minutes a week (30 minutes a day, 5 days a week). This will help you control your weight and prevent disease.     Limit alcohol to one drink per day.     No smoking.     Wear sunscreen to prevent skin cancer.     See your dentist every six months for an exam and cleaning.     See your eye doctor every 1 to 2 years to screen for conditions such as glaucoma, macular degeneration, cataracts, etc            "Follow-ups after your visit        Future tests that were ordered for you today     Open Future Orders        Priority Expected Expires Ordered    Fecal colorectal cancer screen (FIT) Routine 7/23/2018 9/24/2018 7/2/2018            Who to contact     If you have questions or need follow up information about today's clinic visit or your schedule please contact [facility] directly at [phone].  Normal or non-critical lab and imaging results will be communicated to you by MyChart, letter or phone within 4 business days after the clinic has received the results. If you do not hear from us within 7 days, please contact the clinic through MyChart or phone. If you have a critical or abnormal lab result, we will notify you by phone as soon as possible.  Submit refill requests through [employer] or call your pharmacy and they will forward the refill request to us. Please allow 3 business days for your refill to be completed.          Additional Information About Your Visit        Care EveryWhere ID     This is your Care EveryWhere ID. This could be used by other organizations to access your [place] medical records  [id number]        Your Vitals Were     Pulse Temperature Respirations Height Pulse Oximetry BMI (Body Mass Index)    75 97  F (36.1  C) (Oral) 12 5' 11.14\" (1.807 m) 97% 26.2 kg/m2       Blood Pressure from Last 3 Encounters:   07/02/18 124/60   05/24/18 136/60   06/23/17 138/80    Weight from Last 3 Encounters:   07/02/18 188 lb 9.6 oz (85.5 kg)   05/24/18 189 lb (85.7 kg)   07/21/17 189 lb (85.7 kg)              We Performed the Following     Albumin Random Urine Quantitative with Creat Ratio     Basic metabolic panel     FOOT EXAM  NO CHARGE [99133.114]     HEMOGLOBIN A1C     Lipid panel reflex to direct LDL Fasting     TSH WITH FREE T4 REFLEX          Today's Medication Changes          These changes are accurate as of 7/2/18  2:05 PM.  If you have any questions, ask your nurse or doctor.    "            Start taking these medicines.        Dose/Directions    ACE/ARB/ARNI NOT PRESCRIBED (INTENTIONAL)   Used for:  Type 2 diabetes mellitus with hyperglycemia, without long-term current use of insulin (H)   Started by:  Lyndon Patrick MD        Please choose reason not prescribed, below   Refills:  0            Where to get your medicines      Some of these will need a paper prescription and others can be bought over the counter.  Ask your nurse if you have questions.     You don't need a prescription for these medications     ACE/ARB/ARNI NOT PRESCRIBED (INTENTIONAL)                Primary Care Provider Office Phone # Fax #    Lyndon Patrick [phone]       [address] [facility] [address]        Equal Access to Services     [facility]: Hadii jp moreira hadbrennao Sokuldeepali, waaxda luqadaha, qaybta kaalmada adeegyada, hamzah sanders . So [facility] [phone].    ATENCIÓN: Si habla español, tiene a francis disposición servicios gratuitos de asistencia lingüística. [facility] [phone].    We comply with applicable federal civil rights laws and [place] laws. We do not discriminate on the basis of race, color, national origin, age, disability, sex, sexual orientation, or gender identity.            Thank you!     Thank you for choosing [facility]  for your care. Our goal is always to provide you with excellent care. Hearing back from our patients is one way we can continue to improve our services. Please take a few minutes to complete the written survey that you may receive in the mail after your visit with us. Thank you!             Your Updated Medication List - Protect others around you: Learn how to safely use, store and throw away your medicines at www.disposemymeds.org.          This list is accurate as of 7/2/18  2:05 PM.  Always use your most recent med list.                   Brand Name Dispense Instructions for use Diagnosis     ACE/ARB/ARNI NOT PRESCRIBED (INTENTIONAL)      Please choose reason not prescribed, below    Type 2 diabetes mellitus with hyperglycemia, without long-term current use of insulin (H)       ALLEGRA 180 MG tablet   Generic drug:  fexofenadine      Take 1 tablet by mouth daily As needed        ANTIOXIDANT WITH CO Q-10 PO      Take 1 tablet by mouth daily        aspirin 325 MG EC tablet      Take 325 mg by mouth daily.        blood glucose lancets standard    no brand specified    1 Box    Test once daily    Type 2 diabetes mellitus without complication, without long-term current use of insulin (H)       blood glucose monitoring lancets     1 Box    USE 1 DAILY    Type II or unspecified type diabetes mellitus without mention of complication, not stated as uncontrolled       * blood glucose monitoring test strip    ACCU-CHEK SMARTVIEW    100 strip    1 strip by In Vitro route daily    Type II or unspecified type diabetes mellitus without mention of complication, not stated as uncontrolled       * blood glucose monitoring test strip    no brand specified    100 strip    Test once daily    Type 2 diabetes mellitus without complication, without long-term current use of insulin (H)       DAILY VITAMINS PO      Take  by mouth. daily        fluticasone 50 MCG/ACT spray    FLONASE    16 g    Spray 2 sprays into both nostrils daily    Acute recurrent maxillary sinusitis, Chronic rhinitis, unspecified type       * Notice:  This list has 2 medication(s) that are the same as other medications prescribed for you. Read the directions carefully, and ask your doctor or other care provider to review them with you.

## 2018-07-02 NOTE — PROGRESS NOTES
SUBJECTIVE:   Marcos Downing is a 78 year old male who presents for Preventive Visit.  Are you in the first 12 months of your Medicare Part B coverage?  No    Healthy Habits:    Do you get at least three servings of calcium containing foods daily (dairy, green leafy vegetables, etc.)? yes    Amount of exercise or daily activities, outside of work: physical with work, and doing a lot outside work on average about 4 hours a day    Problems taking medications regularly No    Medication side effects: No    Have you had an eye exam in the past two years? yes    Do you see a dentist twice per year? yes    Do you have sleep apnea, excessive snoring or daytime drowsiness?no      Ability to successfully perform activities of daily living: Yes, no assistance needed    Home safety:  none identified     Hearing impairment: Yes, Need to ask people to speak up or repeat themselves.    Difficulty understanding soft or whispered speech. With high piches    Fall risk: No falls in last year     COGNITIVE SCREEN  1) Repeat 3 items (Leader, Season, Table)    2) Clock draw: NORMAL  3) 3 item recall: Recalls 3 objects  Results: 3 items recalled: COGNITIVE IMPAIRMENT LESS LIKELY    Mini-CogTM Copyright S Chang. Licensed by the author for use in [facility]; reprinted with permission (somee@[facility]). All rights reserved.      Diabetes Follow-up      Patient is checking blood sugars: rarely.  Results range in 130's    Diabetic concerns: None     Symptoms of hypoglycemia (low blood sugar): none     Paresthesias (numbness or burning in feet) or sores: No   Date of last diabetic eye exam: within the last 12 months        BP Readings from Last 2 Encounters:   07/02/18 124/60   05/24/18 136/60     Hemoglobin A1C (%)   Date Value   07/02/2018 7.3 (H)   06/20/2017 6.8 (H)     LDL Cholesterol Calculated (mg/dL)   Date Value   07/02/2018 136 (H)   06/20/2017 117 (H)       Diabetes Management Resources    Hyperlipidemia  Follow-Up      Rate your low fat/cholesterol diet?: good    Taking statin?  No    Other lipid medications/supplements?:  none    Reviewed and updated as needed this visit by clinical staff  Tobacco  Allergies  Meds  Med Hx  Surg Hx  Fam Hx  Soc Hx      Reviewed and updated as needed this visit by Provider        Social History   Substance Use Topics     Smoking status: Former Smoker     Quit date: 4/1/1971     Smokeless tobacco: Never Used     Alcohol use Yes      Comment: special occasions only     If you drink alcohol do you typically have >3 drinks per day or >7 drinks per week? No                        Today's PHQ-2 Score:   PHQ-2 ( 1999 Pfizer) 5/24/2018 6/5/2017   Q1: Little interest or pleasure in doing things 0 0   Q2: Feeling down, depressed or hopeless 0 0   PHQ-2 Score 0 0     Do you feel safe in your environment - Yes    Do you have a Health Care Directive?: No: Advance care planning reviewed with patient; information given to patient to review.    Current providers sharing in care for this patient include:   Patient Care Team:  Lyndon Patrick MD as PCP - General (Family Practice)    The following health maintenance items are reviewed in Epic and correct as of today:  Health Maintenance   Topic Date Due     ADVANCE DIRECTIVE PLANNING Q5 YRS  02/20/1994     FALL RISK ASSESSMENT  06/13/2017     A1C Q6 MO  12/20/2017     FOOT EXAM Q1 YEAR  06/05/2018     PNEUMOCOCCAL (2 of 2 - PPSV23) 06/05/2018     TSH W/ FREE T4 REFLEX Q2 YEAR  06/13/2018     CREATININE Q1 YEAR  06/20/2018     LIPID MONITORING Q1 YEAR  06/20/2018     MICROALBUMIN Q1 YEAR  06/20/2018     INFLUENZA VACCINE (1) 09/01/2018     EYE EXAM Q1 YEAR  01/23/2019     PHQ-2 Q1 YR  05/24/2019     TETANUS IMMUNIZATION (SYSTEM ASSIGNED)  04/06/2021     Patient Active Problem List   Diagnosis     Hyperlipidemia with target LDL less than 100     Type 2 diabetes mellitus with hemoglobin A1c goal of less than 7.0% (H)     Cataracts, both eyes  "    Type 2 diabetes mellitus without retinopathy (H)     Glaucoma suspect, bilateral     Posterior vitreous detachment, left     Past Surgical History:   Procedure Laterality Date     CYSTOSCOPY, DILATE URETHRA, COMBINED     1996     TONSILLECTOMY      T & A  age 3     VASECTOMY  Age 39       Social History   Substance Use Topics     Smoking status: Former Smoker     Quit date: 4/1/1971     Smokeless tobacco: Never Used     Alcohol use Yes      Comment: special occasions only     Family History   Problem Relation Age of Onset     Diabetes Mother      HEART DISEASE Mother      Diabetes Father      Diabetes Brother      Cancer Brother      Cancer Sister      Diabetes Sister      Diabetes Brother      Glaucoma No family hx of      Macular Degeneration No family hx of            Pneumonia Vaccine:Adults age 65+ who received Pneumovax (PPSV23) at 65 years or older: Should be given PCV13 > 1 year after their most recent PPSV23    ROS:  Constitutional, HEENT, cardiovascular, pulmonary, gi and gu systems are negative, except as otherwise noted.    OBJECTIVE:   /60 (BP Location: Left arm, Patient Position: Chair, Cuff Size: Adult Regular)  Pulse 75  Temp 97  F (36.1  C) (Oral)  Resp 12  Ht 5' 11.14\" (1.807 m)  Wt 188 lb 9.6 oz (85.5 kg)  SpO2 97%  BMI 26.2 kg/m2 Estimated body mass index is 26.2 kg/(m^2) as calculated from the following:    Height as of this encounter: 5' 11.14\" (1.807 m).    Weight as of this encounter: 188 lb 9.6 oz (85.5 kg).  EXAM:   GENERAL: healthy, alert and no distress  EYES: Eyes grossly normal to inspection, PERRL and conjunctivae and sclerae normal  HENT: ear canals and TM's normal, nose and mouth without ulcers or lesions  NECK: no adenopathy, no asymmetry, masses, or scars and thyroid normal to palpation  RESP: lungs clear to auscultation - no rales, rhonchi or wheezes  CV: regular rate and rhythm, normal S1 S2, no S3 or S4, no murmur, click or rub, no peripheral edema and " peripheral pulses strong  ABDOMEN: soft, nontender, no hepatosplenomegaly, no masses and bowel sounds normal  MS: no gross musculoskeletal defects noted, no edema  SKIN: no suspicious lesions or rashes  NEURO: Normal strength and tone, mentation intact and speech normal  PSYCH: mentation appears normal, affect normal/bright    Diagnostic Test Results:  Results for orders placed or performed in visit on 07/02/18   HEMOGLOBIN A1C   Result Value Ref Range    Hemoglobin A1C 7.3 (H) 0 - 5.6 %   Lipid panel reflex to direct LDL Fasting   Result Value Ref Range    Cholesterol 193 <200 mg/dL    Triglycerides 113 <150 mg/dL    HDL Cholesterol 34 (L) >39 mg/dL    LDL Cholesterol Calculated 136 (H) <100 mg/dL    Non HDL Cholesterol 159 (H) <130 mg/dL   Albumin Random Urine Quantitative with Creat Ratio   Result Value Ref Range    Creatinine Urine 90 mg/dL    Albumin Urine mg/L <5 mg/L    Albumin Urine mg/g Cr Unable to calculate due to low value 0 - 17 mg/g Cr   TSH WITH FREE T4 REFLEX   Result Value Ref Range    TSH 1.58 0.40 - 4.00 mU/L   Basic metabolic panel   Result Value Ref Range    Sodium 139 133 - 144 mmol/L    Potassium 4.5 3.4 - 5.3 mmol/L    Chloride 105 94 - 109 mmol/L    Carbon Dioxide 27 20 - 32 mmol/L    Anion Gap 7 3 - 14 mmol/L    Glucose 126 (H) 70 - 99 mg/dL    Urea Nitrogen 27 7 - 30 mg/dL    Creatinine 1.19 0.66 - 1.25 mg/dL    GFR Estimate 59 (L) >60 mL/min/1.7m2    GFR Estimate If Black 71 >60 mL/min/1.7m2    Calcium 9.4 8.5 - 10.1 mg/dL       ASSESSMENT / PLAN:   Marcos was seen today for physical.    Diagnoses and all orders for this visit:    Medicare annual wellness visit, subsequent    Type 2 diabetes mellitus with hyperglycemia, without long-term current use of insulin (H)        Diet controlled. A1c trending up. Patient would like an as needed medication and discussed as insulin being the only option but can also take a daily pill like Metformin. Will do Metformin.  -     ACE/ARB/ARNI NOT  "PRESCRIBED, INTENTIONAL,; Please choose reason not prescribed, below  -     HEMOGLOBIN A1C  -     Albumin Random Urine Quantitative with Creat Ratio  -     Basic metabolic panel    Hyperlipidemia LDL goal <100        Due to diabetes a Statin is recommended. Also has low HDL and elevated LDL.  -     Lipid panel reflex to direct LDL Fasting  -     TSH WITH FREE T4 REFLEX    Screening for diabetic peripheral neuropathy  -     FOOT EXAM  NO CHARGE [64796.114]    Need for prophylactic vaccination against Streptococcus pneumoniae (pneumococcus)        -   Due for pneumonia shot, but declines.    Colon cancer screening  -     Fecal colorectal cancer screen (FIT); Future    Other orders  -     Cancel: CREATININE        End of Life Planning:  Patient currently has an advanced directive: Yes.  Practitioner is supportive of decision.    COUNSELING:  Reviewed preventive health counseling, as reflected in patient instructions       Regular exercise       Healthy diet/nutrition    BP Readings from Last 1 Encounters:   07/02/18 124/60     Estimated body mass index is 26.2 kg/(m^2) as calculated from the following:    Height as of this encounter: 5' 11.14\" (1.807 m).    Weight as of this encounter: 188 lb 9.6 oz (85.5 kg).    BP Screening:   Last 3 BP Readings:    BP Readings from Last 3 Encounters:   07/02/18 124/60   05/24/18 136/60   06/23/17 138/80       The following was recommended to the patient:  Re-screen BP within a year and recommended lifestyle modifications  Weight management plan: Discussed healthy diet and exercise guidelines and patient will follow up in 12 months in clinic to re-evaluate.     reports that he quit smoking about 47 years ago. He has never used smokeless tobacco.    Appropriate preventive services were discussed with this patient, including applicable screening as appropriate for cardiovascular disease, diabetes, osteopenia/osteoporosis, and glaucoma.  As appropriate for age/gender, discussed " screening for colorectal cancer, prostate cancer, breast cancer, and cervical cancer. Checklist reviewing preventive services available has been given to the patient.    Reviewed patients plan of care and provided an AVS. The Basic Care Plan (routine screening as documented in Health Maintenance) for Marcos meets the Care Plan requirement. This Care Plan has been established and reviewed with the Patient.    Counseling Resources:  ATP IV Guidelines  Pooled Cohorts Equation Calculator  Breast Cancer Risk Calculator  FRAX Risk Assessment  ICSI Preventive Guidelines  Dietary Guidelines for Americans, 2010  USDA's MyPlate  ASA Prophylaxis  Lung CA Screening    Lyndon Patrick MD  [facility]

## 2018-07-02 NOTE — PATIENT INSTRUCTIONS
[facility]    If you have any questions regarding to your visit please contact your care team:       Team Purple:   Clinic Hours Telephone Number   Dr. Cate Overton   7am-7pm  Monday - Thursday   7am-5pm  Fridays  [phone]  (Appointment scheduling available 24/7)    Questions about your recent visit?   Team Line:  [phone]   Urgent Care - [place] [facility] - 11am-9pm Monday-Friday Saturday-Sunday- 9am-5pm   [place] - 5pm-9pm Monday-Friday Saturday-Sunday- 9am-5pm  [phone] - [place]  [phone] [facility]       What options do I have for a visit other than an office visit? We offer electronic visits (e-visits) and telephone visits, when medically appropriate.  Please check with your medical insurance to see if these types of visits are covered, as you will be responsible for any charges that are not paid by your insurance.      You can use [employer] (secure electronic communication) to access to your chart, send your primary care provider a message, or make an appointment. Ask a team member how to get started.     For a price quote for your services, please call our Consumer Price Line at [phone] or our Imaging Cost estimation line at [phone] (for imaging tests).    Matthew Diallo    Preventive Health Recommendations:   Male Ages 65 and over  Yearly exam:             See your health care provider every year in order to  o   Review health changes.   o   Discuss preventive care.    o   Review your medicines if your doctor has prescribed any.    Talk with your health care provider about whether you should have a test to screen for prostate cancer (PSA).    Every 3 years, have a diabetes test (fasting glucose). If you are at risk for diabetes, you should have this test more often.    Every 5 years, have a cholesterol test. Have this test more often if you are at risk for high cholesterol or heart disease.      Every 10 years, have a colonoscopy. Or, have a yearly FIT test (stool test). These exams will check for colon cancer.    Talk to with your health care provider about screening for Abdominal Aortic Aneurysm if you have a family history of AAA or have a history of smoking.    Shots:     Get a flu shot each year.     Get a tetanus shot every 10 years.     Talk to your doctor about your pneumonia vaccines. There are now two you should receive - Pneumovax (PPSV 23) and Prevnar (PCV 13).     Talk to your pharmacist about a shingles vaccine.     Talk to your doctor about the hepatitis B vaccine.  Nutrition:     Eat at least 5 servings of fruits and vegetables each day.     Eat whole-grain bread, whole-wheat pasta and brown rice instead of white grains and rice.     Get adequate Calcium and Vitamin D.   Lifestyle    Exercise for at least 150 minutes a week (30 minutes a day, 5 days a week). This will help you control your weight and prevent disease.     Limit alcohol to one drink per day.     No smoking.     Wear sunscreen to prevent skin cancer.     See your dentist every six months for an exam and cleaning.     See your eye doctor every 1 to 2 years to screen for conditions such as glaucoma, macular degeneration, cataracts, etc

## 2018-07-02 NOTE — NURSING NOTE
"Chief Complaint   Patient presents with     Physical     Initial /60 (BP Location: Left arm, Patient Position: Chair, Cuff Size: Adult Regular)  Pulse 75  Temp 97  F (36.1  C) (Oral)  Resp 12  Ht 5' 11.14\" (1.807 m)  Wt 188 lb 9.6 oz (85.5 kg)  SpO2 97%  BMI 26.2 kg/m2 Estimated body mass index is 26.2 kg/(m^2) as calculated from the following:    Height as of this encounter: 5' 11.14\" (1.807 m).    Weight as of this encounter: 188 lb 9.6 oz (85.5 kg).  BP completed using cuff size: regular    Matthew Diallo  "

## 2018-07-05 ENCOUNTER — TELEPHONE (OUTPATIENT)
Dept: FAMILY MEDICINE | Facility: CLINIC | Age: 78
End: 2018-07-05

## 2018-07-05 DIAGNOSIS — E11.9 TYPE 2 DIABETES MELLITUS WITHOUT COMPLICATION, WITHOUT LONG-TERM CURRENT USE OF INSULIN (H): Primary | ICD-10-CM

## 2018-07-05 DIAGNOSIS — E78.5 HYPERLIPIDEMIA LDL GOAL <100: ICD-10-CM

## 2018-07-05 PROCEDURE — 82274 ASSAY TEST FOR BLOOD FECAL: CPT | Performed by: FAMILY MEDICINE

## 2018-07-05 NOTE — TELEPHONE ENCOUNTER
Reason for Call:  Request for results:    Name of test or procedure: labs    Date of test of procedure: 07/02/18    Location of the test or procedure: [facility]    OK to leave the result message on voice mail or with a family member? YES    Phone number Patient can be reached at:  Cell number on file:    Telephone Information:   Mobile [phone]       Additional comments: patient returning call for results. Please contact patient.    Call taken on 7/5/2018 at 4:24 PM by Priscila Goncalves

## 2018-07-06 RX ORDER — METFORMIN HCL 500 MG
500 TABLET, EXTENDED RELEASE 24 HR ORAL
Qty: 90 TABLET | Refills: 1 | Status: SHIPPED | OUTPATIENT
Start: 2018-07-06 | End: 2019-01-09

## 2018-07-06 NOTE — TELEPHONE ENCOUNTER
Reviewed results with patient: Will start Metformin atthis time, wishes to hold on a statin, will do more exercise and watch diet more closely.  Recheck in 3 months.

## 2018-07-06 NOTE — TELEPHONE ENCOUNTER
Patient returning call to discuss labs.    Routing to provider to advise.    Angelique Grant RN - BC

## 2018-07-07 LAB — HEMOCCULT STL QL IA: NEGATIVE

## 2018-07-09 DIAGNOSIS — Z12.11 COLON CANCER SCREENING: ICD-10-CM

## 2018-12-11 DIAGNOSIS — E78.5 HYPERLIPIDEMIA LDL GOAL <100: ICD-10-CM

## 2018-12-11 DIAGNOSIS — E11.9 TYPE 2 DIABETES MELLITUS WITHOUT COMPLICATION, WITHOUT LONG-TERM CURRENT USE OF INSULIN (H): ICD-10-CM

## 2018-12-11 LAB
CHOLEST SERPL-MCNC: 189 MG/DL
HBA1C MFR BLD: 6.8 % (ref 0–5.6)
HDLC SERPL-MCNC: 34 MG/DL
LDLC SERPL CALC-MCNC: 132 MG/DL
NONHDLC SERPL-MCNC: 155 MG/DL
TRIGL SERPL-MCNC: 117 MG/DL

## 2018-12-11 PROCEDURE — 83036 HEMOGLOBIN GLYCOSYLATED A1C: CPT | Performed by: FAMILY MEDICINE

## 2018-12-11 PROCEDURE — 80061 LIPID PANEL: CPT | Performed by: FAMILY MEDICINE

## 2018-12-11 PROCEDURE — 36415 COLL VENOUS BLD VENIPUNCTURE: CPT | Performed by: FAMILY MEDICINE

## 2018-12-11 NOTE — RESULT ENCOUNTER NOTE
Stable cholesterol.  I'll have Dr. Patrick contact you when he returns to the office if he would like for you to make any changes.   Dr. Baldwin

## 2018-12-11 NOTE — LETTER
[facility]  [address] [place] [address]    December 12, 2018    Marcos Ellis  [address] [facility] [address]          Dear Marcos,    Stable cholesterol.  I'll have Dr. Patrick contact you when he returns to the office if he would like for you to make any changes    Enclosed is a copy of your results.     Results for orders placed or performed in visit on 12/11/18   Lipid panel reflex to direct LDL Fasting   Result Value Ref Range    Cholesterol 189 <200 mg/dL    Triglycerides 117 <150 mg/dL    HDL Cholesterol 34 (L) >39 mg/dL    LDL Cholesterol Calculated 132 (H) <100 mg/dL    Non HDL Cholesterol 155 (H) <130 mg/dL   **A1C FUTURE 3mo   Result Value Ref Range    Hemoglobin A1C 6.8 (H) 0 - 5.6 %       If you have any questions or concerns, please call myself or my nurse at [phone].      Sincerely,        Lia Baldwin MD /amber

## 2019-01-07 DIAGNOSIS — E11.9 TYPE 2 DIABETES MELLITUS WITHOUT COMPLICATION, WITHOUT LONG-TERM CURRENT USE OF INSULIN (H): ICD-10-CM

## 2019-01-07 NOTE — TELEPHONE ENCOUNTER
"Requested Prescriptions   Pending Prescriptions Disp Refills     metFORMIN (GLUCOPHAGE-XR) 500 MG 24 hr tablet 90 tablet 1    Last Written Prescription Date:  7-6-18  Last Fill Quantity: 90,  # refills: 1   Last office visit: 7/2/2018 with prescribing provider:     Future Office Visit:     Sig: Take 1 tablet (500 mg) by mouth daily (with dinner)    Biguanide Agents Failed - 1/7/2019  1:23 PM       Failed - Blood pressure less than 140/90 in past 6 months    BP Readings from Last 3 Encounters:   07/02/18 124/60   05/24/18 136/60   06/23/17 138/80                Failed - Recent (6 mo) or future (30 days) visit within the authorizing provider's specialty    Patient had office visit in the last 6 months or has a visit in the next 30 days with authorizing provider or within the authorizing provider's specialty.  See \"Patient Info\" tab in inbasket, or \"Choose Columns\" in Meds & Orders section of the refill encounter.           Passed - Patient has documented LDL within the past 12 mos.    Recent Labs   Lab Test 12/11/18  1031   *            Passed - Patient has had a Microalbumin in the past 15 mos.    Recent Labs   Lab Test 07/02/18  1410   MICROL <5   UMALCR Unable to calculate due to low value            Passed - Patient is age 10 or older       Passed - Patient has documented A1c within the specified period of time.    If HgbA1C is 8 or greater, it needs to be on file within the past 3 months.  If less than 8, must be on file within the past 6 months.     Recent Labs   Lab Test 12/11/18  1031   A1C 6.8*            Passed - Patient's CR is NOT>1.4 OR Patient's EGFR is NOT<45 within past 12 mos.    Recent Labs   Lab Test 07/02/18  1413   GFRESTIMATED 59*   GFRESTBLACK 71       Recent Labs   Lab Test 07/02/18  1413   CR 1.19            Passed - Patient does NOT have a diagnosis of CHF.       Passed - Medication is active on med list          "

## 2019-01-09 RX ORDER — METFORMIN HCL 500 MG
500 TABLET, EXTENDED RELEASE 24 HR ORAL
Qty: 30 TABLET | Refills: 1 | Status: SHIPPED | OUTPATIENT
Start: 2019-01-09 | End: 2019-02-18

## 2019-02-18 ENCOUNTER — TELEPHONE (OUTPATIENT)
Dept: FAMILY MEDICINE | Facility: CLINIC | Age: 79
End: 2019-02-18

## 2019-02-18 DIAGNOSIS — E11.9 TYPE 2 DIABETES MELLITUS WITHOUT COMPLICATION, WITHOUT LONG-TERM CURRENT USE OF INSULIN (H): ICD-10-CM

## 2019-02-18 RX ORDER — METFORMIN HCL 500 MG
500 TABLET, EXTENDED RELEASE 24 HR ORAL
Qty: 90 TABLET | Refills: 1 | Status: SHIPPED | OUTPATIENT
Start: 2019-02-18 | End: 2019-09-11

## 2019-02-18 NOTE — TELEPHONE ENCOUNTER
Spoke to patient and informed him of message. Patient has appointment scheduled for 6/11/2019.  Meeta HINES CMA ([facility])

## 2019-02-18 NOTE — TELEPHONE ENCOUNTER
Pt wants a 90 day supply on his Metformin-- states he has been in to be seen?   Thanks!  Marylin Harden, [facility]  [phone]

## 2019-04-02 ENCOUNTER — TRANSFERRED RECORDS (OUTPATIENT)
Dept: HEALTH INFORMATION MANAGEMENT | Facility: CLINIC | Age: 79
End: 2019-04-02

## 2019-05-28 DIAGNOSIS — E11.9 TYPE 2 DIABETES MELLITUS WITHOUT COMPLICATION, UNSPECIFIED WHETHER LONG TERM INSULIN USE (H): ICD-10-CM

## 2019-05-28 PROCEDURE — 83036 HEMOGLOBIN GLYCOSYLATED A1C: CPT | Performed by: FAMILY MEDICINE

## 2019-05-28 PROCEDURE — 36415 COLL VENOUS BLD VENIPUNCTURE: CPT | Performed by: FAMILY MEDICINE

## 2019-05-28 PROCEDURE — 80048 BASIC METABOLIC PNL TOTAL CA: CPT | Performed by: FAMILY MEDICINE

## 2019-06-03 LAB
ANION GAP SERPL CALCULATED.3IONS-SCNC: 7 MMOL/L (ref 3–14)
BUN SERPL-MCNC: 30 MG/DL (ref 7–30)
CALCIUM SERPL-MCNC: 8.9 MG/DL (ref 8.5–10.1)
CHLORIDE SERPL-SCNC: 107 MMOL/L (ref 94–109)
CO2 SERPL-SCNC: 25 MMOL/L (ref 20–32)
CREAT SERPL-MCNC: 1.11 MG/DL (ref 0.66–1.25)
GFR SERPL CREATININE-BSD FRML MDRD: 62 ML/MIN/{1.73_M2}
GLUCOSE SERPL-MCNC: 200 MG/DL (ref 70–99)
HBA1C MFR BLD: 7.4 % (ref 0–5.6)
POTASSIUM SERPL-SCNC: 4.5 MMOL/L (ref 3.4–5.3)
SODIUM SERPL-SCNC: 139 MMOL/L (ref 133–144)

## 2019-07-02 ENCOUNTER — OFFICE VISIT (OUTPATIENT)
Dept: OPHTHALMOLOGY | Facility: CLINIC | Age: 79
End: 2019-07-02
Payer: MEDICARE

## 2019-07-02 DIAGNOSIS — H40.003 GLAUCOMA SUSPECT, BILATERAL: ICD-10-CM

## 2019-07-02 DIAGNOSIS — H52.4 PRESBYOPIA: ICD-10-CM

## 2019-07-02 DIAGNOSIS — H25.813 COMBINED FORM OF AGE-RELATED CATARACT, BOTH EYES: ICD-10-CM

## 2019-07-02 DIAGNOSIS — H43.812 POSTERIOR VITREOUS DETACHMENT, LEFT: ICD-10-CM

## 2019-07-02 DIAGNOSIS — E11.9 TYPE 2 DIABETES MELLITUS WITHOUT RETINOPATHY (H): Primary | ICD-10-CM

## 2019-07-02 PROCEDURE — 92015 DETERMINE REFRACTIVE STATE: CPT | Mod: GY | Performed by: OPHTHALMOLOGY

## 2019-07-02 PROCEDURE — 92014 COMPRE OPH EXAM EST PT 1/>: CPT | Performed by: OPHTHALMOLOGY

## 2019-07-02 ASSESSMENT — CUP TO DISC RATIO
OD_RATIO: 0.6
OS_RATIO: 0.7

## 2019-07-02 ASSESSMENT — TONOMETRY
OD_IOP_MMHG: 16
OS_IOP_MMHG: 20
IOP_METHOD: APPLANATION

## 2019-07-02 ASSESSMENT — REFRACTION_MANIFEST
OS_SPHERE: +1.00
OS_CYLINDER: +1.25
OS_AXIS: 173
OD_SPHERE: +1.25
OD_AXIS: 169
OS_ADD: +3.00
OD_CYLINDER: +1.00
OD_ADD: +3.00

## 2019-07-02 ASSESSMENT — REFRACTION_WEARINGRX
OS_SPHERE: +1.50
OD_SPHERE: +1.75
SPECS_TYPE: RDNG
OD_AXIS: 167
OD_AXIS: 173
OS_AXIS: 006
OS_AXIS: 004
OD_CYLINDER: +0.75
OD_ADD: +2.75
OS_CYLINDER: +0.50
OS_CYLINDER: +0.50
OD_SPHERE: +4.25
OS_ADD: +2.75
OS_SPHERE: +4.00
SPECS_TYPE: BIFOCAL
OD_CYLINDER: +0.75

## 2019-07-02 ASSESSMENT — CONF VISUAL FIELD
OS_NORMAL: 1
OD_NORMAL: 1

## 2019-07-02 ASSESSMENT — VISUAL ACUITY
CORRECTION_TYPE: GLASSES
OD_CC: 2-
OS_CC: 20/30
OS_CC+: -2
OS_CC: 2-
METHOD: SNELLEN - LINEAR
OD_CC: 20/25

## 2019-07-02 ASSESSMENT — EXTERNAL EXAM - RIGHT EYE: OD_EXAM: 1+ BROW PTOSIS

## 2019-07-02 ASSESSMENT — SLIT LAMP EXAM - LIDS
COMMENTS: 2+ DERMATOCHALASIS - UPPER LID
COMMENTS: 2+ DERMATOCHALASIS - UPPER LID

## 2019-07-02 ASSESSMENT — EXTERNAL EXAM - LEFT EYE: OS_EXAM: 1+ BROW PTOSIS

## 2019-07-02 NOTE — PATIENT INSTRUCTIONS
Possible posterior vitreous detachment (sudden onset large floater and/or flashing lights) right eye discussed.   Continue observation with regard to glaucoma suspect status.   Glasses Rx given - optional   Return visit 6 months for intraocular pressure check, glaucoma OCT, Blankenship Visual Field and Gonio if not previously done.    Regis Castillo M.D.  [phone]     Patient Education   Diabetes weakens the blood vessels all over the body, including the eyes. Damage to the blood vessels in the eyes can cause swelling or bleeding into part of the eye (called the retina). This is called diabetic retinopathy (SHALONDA-tin--[facility]-thee). If not treated, this disease can cause vision loss or blindness.   Symptoms may include blurred or distorted vision, but many people have no symptoms. It's important to see your eye doctor regularly to check for problems.   Early treatment and good control can help protect your vision. Here are the things you can do to help prevent vision loss:      1. Keep your blood sugar levels under tight control.      2. Bring high blood pressure under control.      3. No smoking.      4. Have yearly dilated eye exams.

## 2019-07-02 NOTE — LETTER
7/2/2019         RE: Marcos Downing  [address] [address]        Dear Colleague,    Thank you for referring your patient, Marcos Downing, to the [facility]. Please see a copy of my visit note below.     Current Eye Medications:  None.       Subjective:  Patient is here for a Diabetic Eye Exam.  No vision changes or concerns. He wears distance glasses (bifocals) and separate reading glasses which are both working well.      Lab Results   Component Value Date    A1C 7.4 05/28/2019    A1C 6.8 12/11/2018    A1C 7.3 07/02/2018    A1C 6.8 06/20/2017    A1C 6.7 06/13/2016        Objective:  See Ophthalmology Exam.       Assessment:  Stable intraocular pressure and discs in patient who is a glaucoma suspect.  No diabetic retinopathy.      ICD-10-CM    1. Type 2 diabetes mellitus without retinopathy (H) E11.9 EYE EXAM (SIMPLE-NONBILLABLE)   2. 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 Posterior vitreous detachment, left H43.812    5. Presbyopia H52.4 REFRACTIVE STATUS        Plan:  Possible posterior vitreous detachment (sudden onset large floater and/or flashing lights) right eye discussed.   Continue observation with regard to glaucoma suspect status.   Glasses Rx given - optional   Return visit 6 months for intraocular pressure check, glaucoma OCT, Blankenship Visual Field and Gonio if not previously done.    Regis Castillo M.D.  [phone]            Again, thank you for allowing me to participate in the care of your patient.        Sincerely,        Regis Castillo MD

## 2019-07-02 NOTE — PROGRESS NOTES
Current Eye Medications:  None.       Subjective:  Patient is here for a Diabetic Eye Exam.  No vision changes or concerns. He wears distance glasses (bifocals) and separate reading glasses which are both working well.      Lab Results   Component Value Date    A1C 7.4 05/28/2019    A1C 6.8 12/11/2018    A1C 7.3 07/02/2018    A1C 6.8 06/20/2017    A1C 6.7 06/13/2016        Objective:  See Ophthalmology Exam.       Assessment:  Stable intraocular pressure and discs in patient who is a glaucoma suspect.  No diabetic retinopathy.      ICD-10-CM    1. Type 2 diabetes mellitus without retinopathy (H) E11.9 EYE EXAM (SIMPLE-NONBILLABLE)   2. 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 Posterior vitreous detachment, left H43.812    5. Presbyopia H52.4 REFRACTIVE STATUS        Plan:  Possible posterior vitreous detachment (sudden onset large floater and/or flashing lights) right eye discussed.   Continue observation with regard to glaucoma suspect status.   Glasses Rx given - optional   Return visit 6 months for intraocular pressure check, glaucoma OCT, Blankenship Visual Field and Gonio if not previously done.    Regis Castillo M.D.  [phone]

## 2019-07-04 PROBLEM — H26.9 CATARACTS, BOTH EYES: Status: RESOLVED | Noted: 2017-01-06 | Resolved: 2019-07-04

## 2019-09-09 ENCOUNTER — DOCUMENTATION ONLY (OUTPATIENT)
Dept: LAB | Facility: CLINIC | Age: 79
End: 2019-09-09

## 2019-09-09 DIAGNOSIS — E11.9 TYPE 2 DIABETES MELLITUS WITHOUT COMPLICATION, WITHOUT LONG-TERM CURRENT USE OF INSULIN (H): ICD-10-CM

## 2019-09-09 DIAGNOSIS — E11.8 TYPE 2 DIABETES MELLITUS WITH COMPLICATION, UNSPECIFIED WHETHER LONG TERM INSULIN USE: Primary | ICD-10-CM

## 2019-09-09 DIAGNOSIS — E11.9 TYPE 2 DIABETES MELLITUS WITHOUT COMPLICATION, UNSPECIFIED WHETHER LONG TERM INSULIN USE (H): ICD-10-CM

## 2019-09-09 DIAGNOSIS — E11.8 TYPE 2 DIABETES MELLITUS WITH COMPLICATION, UNSPECIFIED WHETHER LONG TERM INSULIN USE: ICD-10-CM

## 2019-09-09 LAB — HBA1C MFR BLD: 7.3 % (ref 0–5.6)

## 2019-09-09 PROCEDURE — 82043 UR ALBUMIN QUANTITATIVE: CPT | Performed by: FAMILY MEDICINE

## 2019-09-09 PROCEDURE — 83036 HEMOGLOBIN GLYCOSYLATED A1C: CPT | Performed by: FAMILY MEDICINE

## 2019-09-09 PROCEDURE — 36415 COLL VENOUS BLD VENIPUNCTURE: CPT | Performed by: FAMILY MEDICINE

## 2019-09-09 PROCEDURE — 80061 LIPID PANEL: CPT | Performed by: FAMILY MEDICINE

## 2019-09-10 LAB
CHOLEST SERPL-MCNC: 171 MG/DL
CREAT UR-MCNC: 71 MG/DL
HDLC SERPL-MCNC: 30 MG/DL
LDLC SERPL CALC-MCNC: 106 MG/DL
MICROALBUMIN UR-MCNC: <5 MG/L
MICROALBUMIN/CREAT UR: NORMAL MG/G CR (ref 0–17)
NONHDLC SERPL-MCNC: 141 MG/DL
TRIGL SERPL-MCNC: 177 MG/DL

## 2019-09-11 RX ORDER — METFORMIN HCL 500 MG
500 TABLET, EXTENDED RELEASE 24 HR ORAL
Qty: 30 TABLET | Refills: 0 | Status: SHIPPED | OUTPATIENT
Start: 2019-09-11 | End: 2019-10-17

## 2019-09-17 ENCOUNTER — OFFICE VISIT (OUTPATIENT)
Dept: FAMILY MEDICINE | Facility: CLINIC | Age: 79
End: 2019-09-17
Payer: MEDICARE

## 2019-09-17 VITALS
TEMPERATURE: 97 F | OXYGEN SATURATION: 98 % | WEIGHT: 184 LBS | SYSTOLIC BLOOD PRESSURE: 118 MMHG | DIASTOLIC BLOOD PRESSURE: 58 MMHG | BODY MASS INDEX: 25.56 KG/M2 | HEART RATE: 75 BPM

## 2019-09-17 DIAGNOSIS — Z23 NEED FOR VACCINATION: ICD-10-CM

## 2019-09-17 DIAGNOSIS — E11.65 TYPE 2 DIABETES MELLITUS WITH HYPERGLYCEMIA, WITHOUT LONG-TERM CURRENT USE OF INSULIN (H): Primary | ICD-10-CM

## 2019-09-17 DIAGNOSIS — Z23 NEED FOR PROPHYLACTIC VACCINATION AND INOCULATION AGAINST INFLUENZA: ICD-10-CM

## 2019-09-17 DIAGNOSIS — L98.9 SKIN LESION: ICD-10-CM

## 2019-09-17 DIAGNOSIS — E78.5 HYPERLIPIDEMIA LDL GOAL <100: ICD-10-CM

## 2019-09-17 PROCEDURE — 90732 PPSV23 VACC 2 YRS+ SUBQ/IM: CPT | Performed by: FAMILY MEDICINE

## 2019-09-17 PROCEDURE — 99000 SPECIMEN HANDLING OFFICE-LAB: CPT | Performed by: FAMILY MEDICINE

## 2019-09-17 PROCEDURE — G0008 ADMIN INFLUENZA VIRUS VAC: HCPCS | Performed by: FAMILY MEDICINE

## 2019-09-17 PROCEDURE — 90662 IIV NO PRSV INCREASED AG IM: CPT | Performed by: FAMILY MEDICINE

## 2019-09-17 PROCEDURE — 86317 IMMUNOASSAY INFECTIOUS AGENT: CPT | Mod: 90 | Performed by: FAMILY MEDICINE

## 2019-09-17 PROCEDURE — 36415 COLL VENOUS BLD VENIPUNCTURE: CPT | Performed by: FAMILY MEDICINE

## 2019-09-17 PROCEDURE — 99207 C FOOT EXAM  NO CHARGE: CPT | Performed by: FAMILY MEDICINE

## 2019-09-17 PROCEDURE — 99214 OFFICE O/P EST MOD 30 MIN: CPT | Mod: 25 | Performed by: FAMILY MEDICINE

## 2019-09-17 PROCEDURE — G0009 ADMIN PNEUMOCOCCAL VACCINE: HCPCS | Performed by: FAMILY MEDICINE

## 2019-09-17 NOTE — PROGRESS NOTES
Subjective     Marcos Downing is a 79 year old male who presents to clinic today for the following health issues:    HPI   Chief Complaint   Patient presents with     Lab Result Notice     Imm/Inj     Flu Shot     Spot on Rt foot: (Dorsum) put some ointments but is getting larger, will see dermatologist ([facility])   Otherwise feeling good    Diabetes Follow-up  Been dealing with illness in family; eventually getting better on ECT      How often are you checking your blood sugar? One time daily    What time of day are you checking your blood sugars (select all that apply)?  Before meals    Have you had any blood sugars above 200?  No    Have you had any blood sugars below 70?  No    What symptoms do you notice when your blood sugar is low?  None    What concerns do you have today about your diabetes? None     Do you have any of these symptoms? (Select all that apply)  No numbness or tingling in feet.  No redness, sores or blisters on feet.  No complaints of excessive thirst.  No reports of blurry vision.  No significant changes to weight.     Have you had a diabetic eye exam in the last 12 months? Yes    BP Readings from Last 2 Encounters:   09/17/19 118/58   07/02/18 124/60     Hemoglobin A1C (%)   Date Value   09/09/2019 7.3 (H)   05/28/2019 7.4 (H)     LDL Cholesterol Calculated (mg/dL)   Date Value   09/09/2019 106 (H)   12/11/2018 132 (H)       Diabetes Management Resources    Hyperlipidemia Follow-Up      Are you having any of the following symptoms? (Select all that apply)  No complaints of shortness of breath, chest pain or pressure.  No increased sweating or nausea with activity.  No left-sided neck or arm pain.  No complaints of pain in calves when walking 1-2 blocks.    Are you regularly taking any medication or supplement to lower your cholesterol?   No    Are you having muscle aches or other side effects that you think could be caused by your cholesterol lowering medication?  N/A    PROBLEMS TO ADD  ON...  Reviewed and updated as needed this visit by Provider    Review of Systems    HEENT, cardiovascular, pulmonary, gi and gu systems are negative, except as otherwise noted.      Objective    /58   Pulse 75   Temp 97  F (36.1  C) (Oral)   Wt 83.5 kg (184 lb)   SpO2 98%   BMI 25.56 kg/m    Body mass index is 25.56 kg/m .  Physical Exam   GENERAL: healthy, alert and no distress  NECK: no adenopathy and thyroid normal to palpation  RESP: lungs clear to auscultation - no rales, rhonchi or wheezes  CV: regular rate and rhythm, normal S1 S2, no S3 or S4, no murmur, click or rub, no peripheral edema   ABDOMEN: soft, nontender, no masses and bowel sounds normal  MS: no gross musculoskeletal defects noted, no edema  NEURO: Normal strength and tone, mentation intact and speech normal  PSYCH: mentation appears normal, affect normal/bright  Diabetic foot exam: normal DP and PT pulses, no trophic changes or ulcerative lesions and normal sensory exam  Rt Foot: Dorsum. Brown in ovalish spot  Diagnostic Test Results:  Labs reviewed in Epic      Results for orders placed or performed in visit on 09/09/19   Albumin Random Urine Quantitative with Creat Ratio   Result Value Ref Range    Creatinine Urine 71 mg/dL    Albumin Urine mg/L <5 mg/L    Albumin Urine mg/g Cr Unable to calculate due to low value 0 - 17 mg/g Cr   Lipid panel reflex to direct LDL Fasting   Result Value Ref Range    Cholesterol 171 <200 mg/dL    Triglycerides 177 (H) <150 mg/dL    HDL Cholesterol 30 (L) >39 mg/dL    LDL Cholesterol Calculated 106 (H) <100 mg/dL    Non HDL Cholesterol 141 (H) <130 mg/dL   **A1C FUTURE anytime   Result Value Ref Range    Hemoglobin A1C 7.3 (H) 0 - 5.6 %       Assessment & Plan     Marcos was seen today for lab result notice and imm/inj.    Diagnoses and all orders for this visit:    Type 2 diabetes mellitus with hyperglycemia, without long-term current use of insulin (H)      A1c at goal  -     FOOT  EXAM    Hyperlipidemia LDL goal <100        -    Reviewed labs. Healthy diet and regular exercise    Skin lesion: Rt Foot        -     Dermatology follow up    Need for prophylactic vaccination and inoculation against influenza  -     INFLUENZA (HIGH DOSE) 3 VALENT VACCINE [63462]  -     Cancel: ADMIN PNEUMOVAX VACCINE (For MEDICARE Patients ONLY) []  -     Cancel: Strep pneumo IgG Abys 23 Serotypes    Need for vaccination  -     Pneumococcal vaccine 23 valent PPSV23  (Pneumovax) [48527]    Return in about 1 month (around 10/17/2019) for Wellness follow up.    Lyndon Patrick MD  [facility]

## 2019-09-24 ENCOUNTER — OFFICE VISIT (OUTPATIENT)
Dept: FAMILY MEDICINE | Facility: CLINIC | Age: 79
End: 2019-09-24
Payer: MEDICARE

## 2019-09-24 VITALS
TEMPERATURE: 97.7 F | OXYGEN SATURATION: 96 % | HEART RATE: 83 BPM | BODY MASS INDEX: 25.41 KG/M2 | DIASTOLIC BLOOD PRESSURE: 56 MMHG | RESPIRATION RATE: 16 BRPM | SYSTOLIC BLOOD PRESSURE: 138 MMHG | WEIGHT: 187.6 LBS | HEIGHT: 72 IN

## 2019-09-24 DIAGNOSIS — R35.0 FREQUENCY OF MICTURITION: ICD-10-CM

## 2019-09-24 DIAGNOSIS — J31.0 RHINITIS, UNSPECIFIED TYPE: ICD-10-CM

## 2019-09-24 DIAGNOSIS — Z00.00 ENCOUNTER FOR MEDICARE ANNUAL WELLNESS EXAM: Primary | ICD-10-CM

## 2019-09-24 PROCEDURE — G0439 PPPS, SUBSEQ VISIT: HCPCS | Performed by: FAMILY MEDICINE

## 2019-09-24 RX ORDER — FLUTICASONE PROPIONATE 50 MCG
2 SPRAY, SUSPENSION (ML) NASAL DAILY
Qty: 16 G | Refills: 1 | Status: SHIPPED | OUTPATIENT
Start: 2019-09-24

## 2019-09-24 ASSESSMENT — ACTIVITIES OF DAILY LIVING (ADL): CURRENT_FUNCTION: NO ASSISTANCE NEEDED

## 2019-09-24 ASSESSMENT — MIFFLIN-ST. JEOR: SCORE: 1603.44

## 2019-09-24 ASSESSMENT — ENCOUNTER SYMPTOMS
HEMATOCHEZIA: 0
FEVER: 0
COUGH: 0
NERVOUS/ANXIOUS: 0
EYE PAIN: 0
DIZZINESS: 0
HEMATURIA: 0
CONSTIPATION: 0
CHILLS: 0
FREQUENCY: 1
DIARRHEA: 0
ABDOMINAL PAIN: 0

## 2019-09-24 NOTE — PROGRESS NOTES
"SUBJECTIVE:   Marcos Downing is a 79 year old male who presents for Preventive Visit.  Are you in the first 12 months of your Medicare coverage?  No    Healthy Habits:     In general, how would you rate your overall health?  Excellent    Frequency of exercise:  6-7 days/week    Duration of exercise:  45-60 minutes    Do you usually eat at least 4 servings of fruit and vegetables a day, include whole grains    & fiber and avoid regularly eating high fat or \"junk\" foods?  Yes    Taking medications regularly:  Yes    Medication side effects:  None    Ability to successfully perform activities of daily living:  No assistance needed    Home Safety:  Lack of grab bars in the bathroom, lack of handrails on stairs and no safety concerns identified    Hearing Impairment:  Difficulty following a conversation in a noisy restaurant or crowded room    In the past 6 months, have you been bothered by leaking of urine? Yes    In general, how would you rate your overall mental or emotional health?  Excellent      PHQ-2 Total Score: 0    Additional concerns today:  No    Do you feel safe in your environment? Yes    Do you have a Health Care Directive? Yes: Advance Directive has been received and scanned.    Fall risk  Fallen 2 or more times in the past year?: No  Any fall with injury in the past year?: No     Cognitive Screening   1) Repeat 3 items (Leader, Season, Table)    2) Clock draw: NORMAL  3) 3 item recall: Recalls NO objects   Results: 0 items recalled: PROBABLE COGNITIVE IMPAIRMENT, **INFORM PROVIDER**    Used to remembering with associations.  Now remembers all the three.  Mini-CogTM Copyright AVA Downing. Licensed by the author for use in [facility]; reprinted with permission ([email or web address]@.[email or web address]). All rights reserved.      Six Item Cognitive Impairment Test   (6CIT):      What year is it?                               Correct - 0 points    What month is it?                               Correct - 0 points      Give " the patient an address to remember with five components:   Robbi Conti ( first and last name - 2 components)   [address]  (number and name of street - 2 components)   [place] ( city - 1 component)      About what time is it (within the hour)? Correct - 0 points    Count backwards from 20 to 1:   Correct - 0 points    Say the months of the year in reverse: Correct - 0 points    Repeat the address phrase:   1 error - 2 points    Total 6CIT Score:          Interpretation: The 6CIT uses an inverse score and questions are weighted to produce a total out of 28. Scores of 0-7 are considered normal and 8 or more significant.    Advantages The test has high sensitivity without compromising specificity even in mild dementia. It is easy to translate linguistically and culturally.  Disadvantages The main disadvantage is in the scoring and weighting of the test, which is initially confusing, however computer models have simplified this greatly.    Probability Statistics: At the 7/8 cut off: Overall figures sensitivity 90% specificity 100%, in mild dementia sensitivity = 78% , specificity = 100%    Copyright 2000 [facility]. Courtesy of Dr. Martin Piña    Hearing: a little off; worked in a noisy environment,   Do you have sleep apnea, excessive snoring or daytime drowsiness?: no    Reviewed and updated as needed this visit by clinical staff  Tobacco  Allergies  Meds  Med Hx  Surg Hx  Fam Hx  Soc Hx      Reviewed and updated as needed this visit by Provider    Social History     Tobacco Use     Smoking status: Former Smoker     Last attempt to quit: 1971     Years since quittin.5     Smokeless tobacco: Never Used   Substance Use Topics     Alcohol use: Yes     Comment: special occasions only     If you drink alcohol do you typically have >3 drinks per day or >7 drinks per week? No    Alcohol Use 2019   Prescreen: >3 drinks/day or >7 drinks/week? Not Applicable    Prescreen: >3 drinks/day or >7 drinks/week? -   No flowsheet data found.    Current providers sharing in care for this patient include:   Patient Care Team:  Lyndon Patrick MD as PCP - General (Family Practice)  Lyndon Patrick MD as Assigned PCP    The following health maintenance items are reviewed in Epic and correct as of today:  Health Maintenance   Topic Date Due     ADVANCE CARE PLANNING  1940     ZOSTER IMMUNIZATION (2 of 3) 2014     FALL RISK ASSESSMENT  2017     PHQ-2  2019     MEDICARE ANNUAL WELLNESS VISIT  2019     A1C  2020     BMP  2020     TSH W/FREE T4 REFLEX  2020     EYE EXAM  2020     LIPID  2020     MICROALBUMIN  2020     DIABETIC FOOT EXAM  2020     DTAP/TDAP/TD IMMUNIZATION (2 - Td) 2021     INFLUENZA VACCINE  Completed     PNEUMOCOCCAL IMMUNIZATION 65+ LOW/MEDIUM RISK  Completed     IPV IMMUNIZATION  Aged Out     MENINGITIS IMMUNIZATION  Aged Out     Lab work is in process  Patient Active Problem List   Diagnosis     Hyperlipidemia with target LDL less than 100     Type 2 diabetes mellitus with hyperglycemia, without long-term current use of insulin (H)     Type 2 diabetes mellitus without retinopathy (H)     Glaucoma suspect, bilateral     Posterior vitreous detachment, left     Combined form of age-related cataract, mild, both eyes     Past Surgical History:   Procedure Laterality Date     CYSTOSCOPY, DILATE URETHRA, COMBINED          TONSILLECTOMY      T & A  age 3     VASECTOMY  Age 39       Social History     Tobacco Use     Smoking status: Former Smoker     Last attempt to quit: 1971     Years since quittin.5     Smokeless tobacco: Never Used   Substance Use Topics     Alcohol use: Yes     Comment: special occasions only     Family History   Problem Relation Age of Onset     Diabetes Mother      Heart Disease Mother      Diabetes Father      Diabetes Brother      Cancer Brother   "    Cancer Sister      Diabetes Sister      Diabetes Brother      Glaucoma No family hx of      Macular Degeneration No family hx of          Pneumonia Vaccine:Adults age 65+ who received Pneumovax (PPSV23) at 65 years or older: Should be given PCV13 > 1 year after their most recent PPSV23    Review of Systems   Constitutional: Negative for chills and fever.   HENT: Positive for congestion. Negative for ear pain.    Eyes: Negative for pain.   Respiratory: Negative for cough.    Cardiovascular: Negative for chest pain.   Gastrointestinal: Negative for abdominal pain, constipation, diarrhea and hematochezia.   Genitourinary: Positive for frequency. Negative for hematuria.   Neurological: Negative for dizziness.   Psychiatric/Behavioral: The patient is not nervous/anxious.        OBJECTIVE:   /56   Pulse 83   Temp 97.7  F (36.5  C) (Oral)   Resp 16   Ht 1.836 m (6' 0.28\")   Wt 85.1 kg (187 lb 9.6 oz)   SpO2 96%   BMI 25.24 kg/m   Estimated body mass index is 25.24 kg/m  as calculated from the following:    Height as of this encounter: 1.836 m (6' 0.28\").    Weight as of this encounter: 85.1 kg (187 lb 9.6 oz).  Physical Exam  GENERAL: healthy, alert and no distress  NECK: no adenopathy and thyroid normal to palpation  RESP: lungs clear to auscultation - no rales, rhonchi or wheezes  CV: regular rate and rhythm,  no murmur, click or rub, no peripheral edema   ABDOMEN: soft, nontender, no hepatosplenomegaly, no masses and bowel sounds normal  MS: no gross musculoskeletal defects noted, no edema  NEURO: Normal strength and tone, mentation intact and speech normal  PSYCH: mentation appears normal, affect normal/bright  CROW: declined    ASSESSMENT / PLAN:   Marcos was seen today for physical.    Diagnoses and all orders for this visit:    Encounter for Medicare annual wellness exam    Frequency of micturition    Rhinitis, unspecified type  -     fluticasone (FLONASE) 50 MCG/ACT nasal spray; Spray 2 sprays into " "both nostrils daily        End of Life Planning:  Patient currently has an advanced directive: No.  I have verified the patient's ablity to prepare an advanced directive/make health care decisions.  Literature was provided to assist patient in preparing an advanced directive.    COUNSELING:  Reviewed preventive health counseling, as reflected in patient instructions       Regular exercise       Healthy diet/nutrition    Estimated body mass index is 25.24 kg/m  as calculated from the following:    Height as of this encounter: 1.836 m (6' 0.28\").    Weight as of this encounter: 85.1 kg (187 lb 9.6 oz).    Weight management plan: Discussed healthy diet and exercise guidelines     reports that he quit smoking about 48 years ago. He has never used smokeless tobacco.      Appropriate preventive services were discussed with this patient, including applicable screening as appropriate for cardiovascular disease, diabetes, osteopenia/osteoporosis, and glaucoma.  As appropriate for age/gender, discussed screening for colorectal cancer, prostate cancer, breast cancer, and cervical cancer. Checklist reviewing preventive services available has been given to the patient.    Reviewed patients plan of care and provided an AVS. The Basic Care Plan (routine screening as documented in Health Maintenance) for Marcos meets the Care Plan requirement. This Care Plan has been established and reviewed with the Patient.    Counseling Resources:  ATP IV Guidelines  Pooled Cohorts Equation Calculator  Breast Cancer Risk Calculator  FRAX Risk Assessment  ICSI Preventive Guidelines  Dietary Guidelines for Americans, 2010  USDA's MyPlate  ASA Prophylaxis  Lung CA Screening    Lyndon Patrick MD  [facility]    Identified Health Risks:  "

## 2019-09-24 NOTE — PATIENT INSTRUCTIONS
Patient Education   Personalized Prevention Plan  You are due for the preventive services outlined below.  Your care team is available to assist you in scheduling these services.  If you have already completed any of these items, please share that information with your care team to update in your medical record.  Health Maintenance Due   Topic Date Due     Discuss Advance Care Planning  1940     Zoster (Shingles) Vaccine (2 of 3) 01/01/2014     FALL RISK ASSESSMENT  06/13/2017     PHQ-2  01/01/2019     Annual Wellness Visit  07/02/2019

## 2019-10-14 DIAGNOSIS — E11.9 TYPE 2 DIABETES MELLITUS WITHOUT COMPLICATION, WITHOUT LONG-TERM CURRENT USE OF INSULIN (H): ICD-10-CM

## 2019-10-17 RX ORDER — METFORMIN HCL 500 MG
500 TABLET, EXTENDED RELEASE 24 HR ORAL
Qty: 90 TABLET | Refills: 1 | Status: SHIPPED | OUTPATIENT
Start: 2019-10-17 | End: 2020-01-21

## 2020-01-08 ENCOUNTER — DOCUMENTATION ONLY (OUTPATIENT)
Dept: LAB | Facility: CLINIC | Age: 80
End: 2020-01-08

## 2020-01-08 DIAGNOSIS — E11.69 TYPE 2 DIABETES MELLITUS WITH OTHER SPECIFIED COMPLICATION, UNSPECIFIED WHETHER LONG TERM INSULIN USE (H): Primary | ICD-10-CM

## 2020-01-08 NOTE — PROGRESS NOTES
Please place orders if testing is needed for upcoming lab appointment on 1/09/2020, Thank You. [facility]

## 2020-01-09 ENCOUNTER — OFFICE VISIT (OUTPATIENT)
Dept: OPHTHALMOLOGY | Facility: CLINIC | Age: 80
End: 2020-01-09
Payer: MEDICARE

## 2020-01-09 DIAGNOSIS — H40.003 GLAUCOMA SUSPECT, BILATERAL: Primary | ICD-10-CM

## 2020-01-09 DIAGNOSIS — E11.69 TYPE 2 DIABETES MELLITUS WITH OTHER SPECIFIED COMPLICATION, UNSPECIFIED WHETHER LONG TERM INSULIN USE (H): ICD-10-CM

## 2020-01-09 LAB
CHOLEST SERPL-MCNC: 203 MG/DL
HBA1C MFR BLD: 8.7 % (ref 0–5.6)
HDLC SERPL-MCNC: 32 MG/DL
LDLC SERPL CALC-MCNC: 140 MG/DL
NONHDLC SERPL-MCNC: 171 MG/DL
TRIGL SERPL-MCNC: 157 MG/DL

## 2020-01-09 PROCEDURE — 83036 HEMOGLOBIN GLYCOSYLATED A1C: CPT | Performed by: FAMILY MEDICINE

## 2020-01-09 PROCEDURE — 92133 CPTRZD OPH DX IMG PST SGM ON: CPT | Performed by: OPHTHALMOLOGY

## 2020-01-09 PROCEDURE — 92083 EXTENDED VISUAL FIELD XM: CPT | Performed by: OPHTHALMOLOGY

## 2020-01-09 PROCEDURE — 92012 INTRM OPH EXAM EST PATIENT: CPT | Performed by: OPHTHALMOLOGY

## 2020-01-09 PROCEDURE — 80061 LIPID PANEL: CPT | Performed by: FAMILY MEDICINE

## 2020-01-09 PROCEDURE — 36415 COLL VENOUS BLD VENIPUNCTURE: CPT | Performed by: FAMILY MEDICINE

## 2020-01-09 RX ORDER — LATANOPROST 50 UG/ML
1 SOLUTION/ DROPS OPHTHALMIC AT BEDTIME
Qty: 1 BOTTLE | Refills: 11 | Status: SHIPPED | OUTPATIENT
Start: 2020-01-09 | End: 2021-03-22

## 2020-01-09 ASSESSMENT — TONOMETRY
OD_IOP_MMHG: 18
OD_IOP_MMHG: 21
IOP_METHOD: ICARE
OS_IOP_MMHG: 20
IOP_METHOD: ICARE
OS_IOP_MMHG: 29

## 2020-01-09 ASSESSMENT — EXTERNAL EXAM - RIGHT EYE: OD_EXAM: 1+ BROW PTOSIS

## 2020-01-09 ASSESSMENT — SLIT LAMP EXAM - LIDS
COMMENTS: 2+ DERMATOCHALASIS - UPPER LID
COMMENTS: 2+ DERMATOCHALASIS - UPPER LID

## 2020-01-09 ASSESSMENT — EXTERNAL EXAM - LEFT EYE: OS_EXAM: 1+ BROW PTOSIS

## 2020-01-09 ASSESSMENT — VISUAL ACUITY
METHOD: SNELLEN - LINEAR
OD_CC+: -1
CORRECTION_TYPE: GLASSES
OD_CC: 20/25
OS_CC: 20/30
OS_CC+: +2

## 2020-01-09 NOTE — PATIENT INSTRUCTIONS
Start Latanoprost: One drop both eyes at bedtime.  Return visit one month for intraocular pressure check.    Regis Castillo M.D.  [phone]

## 2020-01-09 NOTE — LETTER
1/9/2020         RE: Marcos Downing  [address]  [place]. \[facility].\"" [id number]        Dear Colleague,    Thank you for referring your patient, Marcos Downing, to the [facility]. Please see a copy of my visit note below.     Current Eye Medications:  OTC drop when needed. - has not used lately     Subjective:  Pt here for glaucoma evaluation.       Objective:  See Ophthalmology Exam.       Assessment:  Intraocular pressure too high left eye > right eye in patient who is a glaucoma suspect.  Subtle worsening of glaucoma OCT and Blankenship Visual Field both eyes.      Plan:  Start Latanoprost: One drop both eyes at bedtime.  Return visit one month for intraocular pressure check.    Regis Castillo M.D.  [phone]         Again, thank you for allowing me to participate in the care of your patient.        Sincerely,        Regis Castillo MD

## 2020-01-09 NOTE — PROGRESS NOTES
Current Eye Medications:  OTC drop when needed. - has not used lately     Subjective:  Pt here for glaucoma evaluation.       Objective:  See Ophthalmology Exam.       Assessment:  Intraocular pressure too high left eye > right eye in patient who is a glaucoma suspect.  Subtle worsening of glaucoma OCT and Blankenship Visual Field both eyes.      Plan:  Start Latanoprost: One drop both eyes at bedtime.  Return visit one month for intraocular pressure check.    Regis Castillo M.D.  [phone]

## 2020-01-10 PROBLEM — H40.003 GLAUCOMA SUSPECT, BILATERAL: Status: ACTIVE | Noted: 2017-02-04

## 2020-01-10 ASSESSMENT — PACHYMETRY
OS_CT(UM): 578
OD_CT(UM): 598

## 2020-01-21 ENCOUNTER — OFFICE VISIT (OUTPATIENT)
Dept: FAMILY MEDICINE | Facility: CLINIC | Age: 80
End: 2020-01-21
Payer: MEDICARE

## 2020-01-21 VITALS
DIASTOLIC BLOOD PRESSURE: 74 MMHG | HEART RATE: 86 BPM | OXYGEN SATURATION: 96 % | BODY MASS INDEX: 25.6 KG/M2 | HEIGHT: 72 IN | WEIGHT: 189 LBS | RESPIRATION RATE: 14 BRPM | TEMPERATURE: 97.8 F | SYSTOLIC BLOOD PRESSURE: 126 MMHG

## 2020-01-21 DIAGNOSIS — E78.5 HYPERLIPIDEMIA LDL GOAL <100: ICD-10-CM

## 2020-01-21 DIAGNOSIS — E11.65 TYPE 2 DIABETES MELLITUS WITH HYPERGLYCEMIA, WITHOUT LONG-TERM CURRENT USE OF INSULIN (H): Primary | ICD-10-CM

## 2020-01-21 PROCEDURE — 99214 OFFICE O/P EST MOD 30 MIN: CPT | Performed by: FAMILY MEDICINE

## 2020-01-21 RX ORDER — METFORMIN HCL 500 MG
1000 TABLET, EXTENDED RELEASE 24 HR ORAL
Qty: 180 TABLET | Refills: 0 | Status: SHIPPED | OUTPATIENT
Start: 2020-01-21 | End: 2020-06-08

## 2020-01-21 ASSESSMENT — MIFFLIN-ST. JEOR: SCORE: 1609.75

## 2020-01-21 NOTE — PROGRESS NOTES
Subjective     Marcos Downing is a 79 year old male who presents to clinic today for the following health issues:    History of Present Illness        Diabetes:   He presents for follow up of diabetes.  He is checking home blood glucose one time daily. He checks blood glucose before meals.  Blood glucose is never over 200 and never under 70. When his blood glucose is low, the patient is asymptomatic for confusion, blurred vision, lethargy and reports not feeling dizzy, shaky, or weak.  He has no concerns regarding his diabetes at this time.  He is not experiencing numbness or burning in feet, excessive thirst, blurry vision, weight changes or redness, sores or blisters on feet. The patient has had a diabetic eye exam in the last 12 months. Eye exam performed on two weeks ago and recheck on 1/31/2020. Location of last eye exam [facility] .        He eats 2-3 servings of fruits and vegetables daily.He consumes 1 sweetened beverage(s) daily.He exercises with enough effort to increase his heart rate 10 to 19 minutes per day.  He exercises with enough effort to increase his heart rate 3 or less days per week. He is missing 1 dose(s) of medications per week.     BP Readings from Last 2 Encounters:   01/21/20 126/74   09/24/19 138/56     Hemoglobin A1C (%)   Date Value   01/09/2020 8.7 (H)   09/09/2019 7.3 (H)     LDL Cholesterol Calculated (mg/dL)   Date Value   01/09/2020 140 (H)   09/09/2019 106 (H)               Hyperlipidemia Follow-Up      Are you regularly taking any medication or supplement to lower your cholesterol?   No    Are you having muscle aches or other side effects that you think could be caused by your cholesterol lowering medication?  No    Patient Active Problem List   Diagnosis     Hyperlipidemia with target LDL less than 100     Type 2 diabetes mellitus with hyperglycemia, without long-term current use of insulin (H)     Type 2 diabetes mellitus without retinopathy (H)     Glaucoma suspect,  "bilateral - treated     Posterior vitreous detachment, left     Combined form of age-related cataract, mild, both eyes     Past Surgical History:   Procedure Laterality Date     CYSTOSCOPY, DILATE URETHRA, COMBINED          TONSILLECTOMY      T & A  age 3     VASECTOMY  Age 39       Social History     Tobacco Use     Smoking status: Former Smoker     Last attempt to quit: 1971     Years since quittin.8     Smokeless tobacco: Never Used   Substance Use Topics     Alcohol use: Yes     Comment: special occasions only     Family History   Problem Relation Age of Onset     Diabetes Mother      Heart Disease Mother      Diabetes Father      Diabetes Brother      Cancer Brother      Cancer Sister      Diabetes Sister      Diabetes Brother      Glaucoma No family hx of      Macular Degeneration No family hx of          PROBLEMS TO ADD ON...  Reviewed and updated as needed this visit by Provider    Review of Systems  Constitutional, HEENT, cardiovascular, pulmonary, gi and gu systems are negative, except as otherwise noted.      Objective    /74   Pulse 86   Temp 97.8  F (36.6  C) (Oral)   Resp 14   Ht 1.836 m (6' 0.28\")   Wt 85.7 kg (189 lb)   SpO2 96%   BMI 25.43 kg/m    Body mass index is 25.43 kg/m .  Physical Exam   GENERAL: healthy, alert and no distress  EYES: Eyes grossly normal to inspection, PERRL and conjunctivae and sclerae normal  NECK: no adenopathy, no asymmetry, masses, or scars and thyroid normal to palpation  RESP: lungs clear to auscultation - no rales, rhonchi or wheezes  CV: regular rate and rhythm, normal S1 S2, no S3 or S4, no murmur, click or rub, no peripheral edema  ABDOMEN: soft, nontender, no masses and bowel sounds normal  MS: no gross musculoskeletal defects noted, no edema  NEURO: Normal strength and tone, mentation intact and speech normal  PSYCH: mentation appears normal, affect normal/bright       Assessment & Plan     Marcos was seen today for diabetes and " results.    Diagnoses and all orders for this visit:    Type 2 diabetes mellitus with hyperglycemia, without long-term current use of insulin (H)   Reviewed recent labs which showed above desirable A1c and cholesterol levels. He admits not been watching diet closely especially with the holidays. Has already cut out peanut butter. We discussed increasing the dose of Metformin to 2 tablets daily. Recheck A1c in 2 months.  -     metFORMIN (GLUCOPHAGE-XR) 500 MG 24 hr tablet; Take 2 tablets (1,000 mg) by mouth daily (with dinner)  -     **A1C FUTURE anytime; Future    Hyperlipidemia LDL goal <100    Cholesterol level slightly elevated. Has DM. Not keen on starting a cholesterol pill  -     LDL cholesterol direct; Future    Return in about 2 months (around 3/21/2020) for Routine Visit.    Lyndon Patrick MD  [facility]

## 2020-01-31 ENCOUNTER — OFFICE VISIT (OUTPATIENT)
Dept: OPHTHALMOLOGY | Facility: CLINIC | Age: 80
End: 2020-01-31
Payer: MEDICARE

## 2020-01-31 DIAGNOSIS — H40.003 GLAUCOMA SUSPECT, BILATERAL: Primary | ICD-10-CM

## 2020-01-31 PROCEDURE — 92012 INTRM OPH EXAM EST PATIENT: CPT | Performed by: OPHTHALMOLOGY

## 2020-01-31 ASSESSMENT — EXTERNAL EXAM - RIGHT EYE: OD_EXAM: 1+ BROW PTOSIS

## 2020-01-31 ASSESSMENT — VISUAL ACUITY
OS_CC+: -1
OS_CC: 20/40
OD_CC+: -2
METHOD: SNELLEN - LINEAR
OD_CC: 20/30
CORRECTION_TYPE: GLASSES

## 2020-01-31 ASSESSMENT — SLIT LAMP EXAM - LIDS
COMMENTS: 2+ DERMATOCHALASIS - UPPER LID
COMMENTS: 2+ DERMATOCHALASIS - UPPER LID

## 2020-01-31 ASSESSMENT — REFRACTION_WEARINGRX
OD_CYLINDER: +0.75
OS_CYLINDER: +0.50
OD_CYLINDER: +0.75
OD_SPHERE: +1.75
OD_ADD: +2.75
OS_ADD: +2.75
OD_AXIS: 173
OS_AXIS: 006
OS_SPHERE: +1.50
OS_SPHERE: +4.00
OS_CYLINDER: +0.50
SPECS_TYPE: BIFOCAL
SPECS_TYPE: RDNG
OS_AXIS: 004
OD_AXIS: 167
OD_SPHERE: +4.25

## 2020-01-31 ASSESSMENT — EXTERNAL EXAM - LEFT EYE: OS_EXAM: 1+ BROW PTOSIS

## 2020-01-31 ASSESSMENT — TONOMETRY
IOP_METHOD: APPLANATION
OS_IOP_MMHG: 15
OD_IOP_MMHG: 13

## 2020-01-31 NOTE — LETTER
1/31/2020         RE: Marcos Downing  [address] [facility] [address]        Dear Colleague,    Thank you for referring your patient, Marcos Downing, to the [facility]. Please see a copy of my visit note below.     Current Eye Medications:  Latanoprost at bedtime both eyes, last took at midnight.     Subjective:  3 week follow up for IOP check. Vision is doing well, unchanged both eyes. No eye pain or discomfort in either eye.      Objective:  See Ophthalmology Exam.       Assessment:  Good initial reduction in intraocular pressure both eyes with Latanoprost.      Plan:  Continue using Latanoprost (green top) both eyes at bedtime.  Return Visit in 6 months for Complete Exam.     Regis Castillo M.D.  [phone]             Again, thank you for allowing me to participate in the care of your patient.        Sincerely,        Regis Castillo MD

## 2020-01-31 NOTE — PATIENT INSTRUCTIONS
Continue using Latanoprost (green top) both eyes at bedtime.  Return Visit in 6 months for Complete Exam.     Regis Castillo M.D.  [phone]

## 2020-06-08 DIAGNOSIS — E11.65 TYPE 2 DIABETES MELLITUS WITH HYPERGLYCEMIA, WITHOUT LONG-TERM CURRENT USE OF INSULIN (H): ICD-10-CM

## 2020-06-08 RX ORDER — METFORMIN HCL 500 MG
1000 TABLET, EXTENDED RELEASE 24 HR ORAL
Qty: 180 TABLET | Refills: 0 | Status: SHIPPED | OUTPATIENT
Start: 2020-06-08 | End: 2020-09-17

## 2020-06-08 NOTE — TELEPHONE ENCOUNTER
"Routing refill request to provider for review/approval because:  Labs not current:  A1c    Requested Prescriptions   Pending Prescriptions Disp Refills     metFORMIN (GLUCOPHAGE-XR) 500 MG 24 hr tablet 180 tablet 0     Sig: Take 2 tablets (1,000 mg) by mouth daily (with dinner)       Biguanide Agents Failed - 6/8/2020 11:59 AM        Failed - Patient has documented A1c within the specified period of time.     If HgbA1C is 8 or greater, it needs to be on file within the past 3 months.  If less than 8, must be on file within the past 6 months.     Recent Labs   Lab Test 01/09/20  0715   A1C 8.7*             Failed - Patient's CR is NOT>1.4 OR Patient's EGFR is NOT<45 within past 12 mos.     Recent Labs   Lab Test 05/28/19  1255   GFRESTIMATED 62   GFRESTBLACK 72       Recent Labs   Lab Test 05/28/19  1255   CR 1.11             Passed - Patient is age 10 or older        Passed - Patient does NOT have a diagnosis of CHF.        Passed - Medication is active on med list        Passed - Recent (6 mo) or future (30 days) visit within the authorizing provider's specialty     Patient had office visit in the last 6 months or has a visit in the next 30 days with authorizing provider or within the authorizing provider's specialty.  See \"Patient Info\" tab in inbasket, or \"Choose Columns\" in Meds & Orders section of the refill encounter.               Lilian Sales RN  "

## 2020-06-10 ENCOUNTER — TELEPHONE (OUTPATIENT)
Dept: FAMILY MEDICINE | Facility: CLINIC | Age: 80
End: 2020-06-10

## 2020-06-10 DIAGNOSIS — Z20.822 EXPOSURE TO COVID-19 VIRUS: ICD-10-CM

## 2020-06-10 DIAGNOSIS — E11.69 TYPE 2 DIABETES MELLITUS WITH OTHER SPECIFIED COMPLICATION, UNSPECIFIED WHETHER LONG TERM INSULIN USE (H): Primary | ICD-10-CM

## 2020-06-10 NOTE — TELEPHONE ENCOUNTER
Reason for call:  Other   Patient called regarding (reason for call): call back  Additional comments: Patient is calling because he and his wife thinks that they had COVID in the first week on march (were staying in [place]) and is wondering about if they should get tested. He also wants labs done. Please call patient back to advise.    Phone number to reach patient:  Cell number on file:    Telephone Information:   Mobile [phone]       Best Time:  any    Can we leave a detailed message on this number?  YES    Travel screening: Negative

## 2020-06-10 NOTE — TELEPHONE ENCOUNTER
He can be tested by serology, but test may not be done with other regular labs which he can schedule lab appointment at clinic. For Covid serology he will receive a call to schedule.

## 2020-06-11 ENCOUNTER — NURSE TRIAGE (OUTPATIENT)
Dept: NURSING | Facility: CLINIC | Age: 80
End: 2020-06-11

## 2020-06-11 DIAGNOSIS — Z20.822 EXPOSURE TO COVID-19 VIRUS: Primary | ICD-10-CM

## 2020-06-16 DIAGNOSIS — Z20.822 EXPOSURE TO COVID-19 VIRUS: ICD-10-CM

## 2020-06-16 DIAGNOSIS — E11.69 TYPE 2 DIABETES MELLITUS WITH OTHER SPECIFIED COMPLICATION, UNSPECIFIED WHETHER LONG TERM INSULIN USE (H): ICD-10-CM

## 2020-06-16 LAB
ANION GAP SERPL CALCULATED.3IONS-SCNC: 3 MMOL/L (ref 3–14)
BUN SERPL-MCNC: 25 MG/DL (ref 7–30)
CALCIUM SERPL-MCNC: 8.8 MG/DL (ref 8.5–10.1)
CHLORIDE SERPL-SCNC: 109 MMOL/L (ref 94–109)
CHOLEST SERPL-MCNC: 160 MG/DL
CO2 SERPL-SCNC: 27 MMOL/L (ref 20–32)
CREAT SERPL-MCNC: 0.99 MG/DL (ref 0.66–1.25)
GFR SERPL CREATININE-BSD FRML MDRD: 71 ML/MIN/{1.73_M2}
GLUCOSE SERPL-MCNC: 145 MG/DL (ref 70–99)
HBA1C MFR BLD: 7.6 % (ref 0–5.6)
HDLC SERPL-MCNC: 33 MG/DL
LDLC SERPL CALC-MCNC: 106 MG/DL
NONHDLC SERPL-MCNC: 127 MG/DL
POTASSIUM SERPL-SCNC: 4.6 MMOL/L (ref 3.4–5.3)
SODIUM SERPL-SCNC: 139 MMOL/L (ref 133–144)
TRIGL SERPL-MCNC: 103 MG/DL

## 2020-06-16 PROCEDURE — 83036 HEMOGLOBIN GLYCOSYLATED A1C: CPT | Performed by: FAMILY MEDICINE

## 2020-06-16 PROCEDURE — 80061 LIPID PANEL: CPT | Performed by: FAMILY MEDICINE

## 2020-06-16 PROCEDURE — 36415 COLL VENOUS BLD VENIPUNCTURE: CPT | Performed by: FAMILY MEDICINE

## 2020-06-16 PROCEDURE — 80048 BASIC METABOLIC PNL TOTAL CA: CPT | Performed by: FAMILY MEDICINE

## 2020-06-16 PROCEDURE — 86769 SARS-COV-2 COVID-19 ANTIBODY: CPT | Performed by: FAMILY MEDICINE

## 2020-06-17 LAB
COVID-19 ANTIBODY IGG: NEGATIVE
LAB TEST METHOD: NORMAL

## 2020-07-07 ENCOUNTER — OFFICE VISIT (OUTPATIENT)
Dept: OPHTHALMOLOGY | Facility: CLINIC | Age: 80
End: 2020-07-07
Payer: MEDICARE

## 2020-07-07 DIAGNOSIS — H25.813 COMBINED FORM OF AGE-RELATED CATARACT, BOTH EYES: ICD-10-CM

## 2020-07-07 DIAGNOSIS — E11.65 TYPE 2 DIABETES MELLITUS WITH HYPERGLYCEMIA, WITHOUT LONG-TERM CURRENT USE OF INSULIN (H): Primary | ICD-10-CM

## 2020-07-07 DIAGNOSIS — H43.812 POSTERIOR VITREOUS DETACHMENT, LEFT: ICD-10-CM

## 2020-07-07 DIAGNOSIS — H52.4 PRESBYOPIA: ICD-10-CM

## 2020-07-07 DIAGNOSIS — Z01.00 EXAMINATION OF EYES AND VISION: ICD-10-CM

## 2020-07-07 DIAGNOSIS — H40.003 GLAUCOMA SUSPECT, BILATERAL: ICD-10-CM

## 2020-07-07 PROCEDURE — 92014 COMPRE OPH EXAM EST PT 1/>: CPT | Performed by: OPHTHALMOLOGY

## 2020-07-07 PROCEDURE — 92015 DETERMINE REFRACTIVE STATE: CPT | Mod: GY | Performed by: OPHTHALMOLOGY

## 2020-07-07 ASSESSMENT — CUP TO DISC RATIO
OS_RATIO: 0.7
OD_RATIO: 0.6

## 2020-07-07 ASSESSMENT — SLIT LAMP EXAM - LIDS
COMMENTS: 2+ DERMATOCHALASIS - UPPER LID
COMMENTS: 2+ DERMATOCHALASIS - UPPER LID

## 2020-07-07 ASSESSMENT — REFRACTION_MANIFEST
OS_ADD: +3.00
OD_CYLINDER: +1.00
OD_SPHERE: +1.00
OS_SPHERE: +1.50
OD_AXIS: 154
OS_CYLINDER: +0.50
OD_ADD: +3.00
OS_AXIS: 015

## 2020-07-07 ASSESSMENT — REFRACTION_WEARINGRX
OD_AXIS: 173
OD_ADD: +2.75
OD_SPHERE: +4.25
OD_AXIS: 167
OS_CYLINDER: +0.50
OD_SPHERE: +1.75
SPECS_TYPE: BIFOCAL
OS_SPHERE: +1.50
OS_SPHERE: +4.00
OD_CYLINDER: +0.75
OD_CYLINDER: +0.75
OS_AXIS: 006
OS_AXIS: 004
SPECS_TYPE: RDNG
OS_ADD: +2.75
OS_CYLINDER: +0.50

## 2020-07-07 ASSESSMENT — TONOMETRY
OS_IOP_MMHG: 17
OD_IOP_MMHG: 15
IOP_METHOD: APPLANATION
IOP_METHOD: APPLANATION
OS_IOP_MMHG: 18

## 2020-07-07 ASSESSMENT — VISUAL ACUITY
METHOD: SNELLEN - LINEAR
OD_CC+: -1
OD_CC: 20/40
CORRECTION_TYPE: GLASSES
OS_CC: 20/40
OS_CC+: -1

## 2020-07-07 ASSESSMENT — CONF VISUAL FIELD
OD_NORMAL: 1
OS_NORMAL: 1

## 2020-07-07 ASSESSMENT — EXTERNAL EXAM - LEFT EYE: OS_EXAM: 1+ BROW PTOSIS

## 2020-07-07 ASSESSMENT — EXTERNAL EXAM - RIGHT EYE: OD_EXAM: 1+ BROW PTOSIS

## 2020-07-07 NOTE — PATIENT INSTRUCTIONS
Glasses prescription given - optional  Continue using Latanoprost (green top) both eyes at bedtime.   Use artificial tears up to 4 times daily both eyes.  (Refresh Tears, Systane Ultra/Balance, or Theratears)  Possible posterior vitreous detachment (sudden onset large floater and/or flashing lights) right eye discussed.  Return Visit in 6 months for IOP check, Glaucoma OCT and Blankenship Visual Field.     Regis Castillo M.D.  [phone]    Patient Education   Diabetes weakens the blood vessels all over the body, including the eyes. Damage to the blood vessels in the eyes can cause swelling or bleeding into part of the eye (called the retina). This is called diabetic retinopathy (SHALONDA-tin--puh-thee). If not treated, this disease can cause vision loss or blindness.   Symptoms may include blurred or distorted vision, but many people have no symptoms. It's important to see your eye doctor regularly to check for problems.   Early treatment and good control can help protect your vision. Here are the things you can do to help prevent vision loss:      1. Keep your blood sugar levels under tight control.      2. Bring high blood pressure under control.      3. No smoking.      4. Have yearly dilated eye exams.

## 2020-07-07 NOTE — PROGRESS NOTES
Current Eye Medications:  Latanoprost at bedtime both eyes     Subjective:  Here for complete today. DM, last a1c was 7.6 on 6-16-20. Doing well, no flashes or floaters.     Objective:  See Ophthalmology Exam.       Assessment:  Stable intraocular pressure and discs.  No diabetic retinopathy.      ICD-10-CM    1. Type 2 diabetes mellitus with hyperglycemia, without long-term current use of insulin (H)  E11.65    2. Glaucoma suspect, bilateral - treated  H40.003    3. Combined form of age-related cataract, mild, both eyes  H25.813    4. Posterior vitreous detachment, left  H43.812    5. Examination of eyes and vision  Z01.00    6. Presbyopia  H52.4         Plan:  Glasses prescription given - optional  Continue using Latanoprost (green top) both eyes at bedtime.   Use artificial tears up to 4 times daily both eyes.  (Refresh Tears, Systane Ultra/Balance, or Theratears)  Possible posterior vitreous detachment (sudden onset large floater and/or flashing lights) right eye discussed.  Return Visit in 6 months for IOP check, Glaucoma OCT and Blankenship Visual Field.     Regis Castillo M.D.  [phone]

## 2020-07-07 NOTE — LETTER
7/7/2020         RE: Marcos Downing  [address] [facility] [address]        Dear Colleague,    Thank you for referring your patient, Marcos Downing, to the [facility]. Please see a copy of my visit note below.     Current Eye Medications:  Latanoprost at bedtime both eyes     Subjective:  Here for complete today. DM, last a1c was 7.6 on 6-16-20. Doing well, no flashes or floaters.     Objective:  See Ophthalmology Exam.       Assessment:  Stable intraocular pressure and discs.  No diabetic retinopathy.      ICD-10-CM    1. Type 2 diabetes mellitus with hyperglycemia, without long-term current use of insulin (H)  E11.65    2. Glaucoma suspect, bilateral - treated  H40.003    3. Combined form of age-related cataract, mild, both eyes  H25.813    4. Posterior vitreous detachment, left  H43.812    5. Examination of eyes and vision  Z01.00    6. Presbyopia  H52.4         Plan:  Glasses prescription given - optional  Continue using Latanoprost (green top) both eyes at bedtime.   Use artificial tears up to 4 times daily both eyes.  (Refresh Tears, Systane Ultra/Balance, or Theratears)  Possible posterior vitreous detachment (sudden onset large floater and/or flashing lights) right eye discussed.  Return Visit in 6 months for IOP check, Glaucoma OCT and Blankenship Visual Field.     Regis Castillo M.D.  [phone]         Again, thank you for allowing me to participate in the care of your patient.        Sincerely,        Regis Castillo MD

## 2020-09-15 DIAGNOSIS — E11.65 TYPE 2 DIABETES MELLITUS WITH HYPERGLYCEMIA, WITHOUT LONG-TERM CURRENT USE OF INSULIN (H): ICD-10-CM

## 2020-09-16 NOTE — TELEPHONE ENCOUNTER
Patient was due in March for diabetes follow up.  No appointment pending at this time.  Routing to provider to advise.    Lauren CHON, RN

## 2020-09-17 ENCOUNTER — TELEPHONE (OUTPATIENT)
Dept: FAMILY MEDICINE | Facility: CLINIC | Age: 80
End: 2020-09-17

## 2020-09-17 DIAGNOSIS — E11.65 TYPE 2 DIABETES MELLITUS WITH HYPERGLYCEMIA, WITHOUT LONG-TERM CURRENT USE OF INSULIN (H): ICD-10-CM

## 2020-09-17 RX ORDER — METFORMIN HCL 500 MG
1000 TABLET, EXTENDED RELEASE 24 HR ORAL
Qty: 60 TABLET | Refills: 0 | Status: SHIPPED | OUTPATIENT
Start: 2020-09-17 | End: 2021-03-24

## 2020-09-17 NOTE — LETTER
[facility]  [address] [place] [id number] [phone]          September 17, 2020    Marcos Downing                                                                                                                     [address] [place] [id number]            Dear Mracos,    Your medication metFORMIN (GLUCOPHAGE-XR) 500 MG 24 hr tablet was filled for 1 time refill only due to: You need to be seen because you need a DM recheck. Please call the clinic at [phone] to make your appointment.       Sincerely,         Lyndon Patrick MD/[facility]

## 2020-09-17 NOTE — TELEPHONE ENCOUNTER
See refill request September 17, 2020 for outcome. Duplicated.   Message has been added to the refill encounter.     Yelitza Quiñonez,

## 2020-09-17 NOTE — TELEPHONE ENCOUNTER
Medication is being filled for 1 time refill only due to:  Patient needs to be seen because need DM recheck.   Please return call to inform Rx sent for month supply-schedule appt.  Carito Malhotra RN

## 2020-09-17 NOTE — TELEPHONE ENCOUNTER
Reason for Call:  Medication or medication refill:    Do you use a [facility]?  Name of the pharmacy and phone number for the current request:  [facility]    Name of the medication requested: Metformin    Other request: He is completely out of medication    Can we leave a detailed message on this number? YES    Phone number patient can be reached at: Cell number on file:    Telephone Information:   Mobile [phone]       Best Time: any     Call taken on 9/17/2020 at 10:13 AM by Reva aCo

## 2020-09-17 NOTE — TELEPHONE ENCOUNTER
Medication Request (metFORMIN (GLUCOPHAGE-XR) 500 MG 24 hr tablet)     Reva Cao routed conversation to [employer] 2 minutes ago (10:17 AM)       Reva Cao 2 minutes ago (10:17 AM)          Reason for Call:  Medication or medication refill:     Do you use a [facility]?  Name of the pharmacy and phone number for the current request:  [facility]     Name of the medication requested: Metformin     Other request: He is completely out of medication     Can we leave a detailed message on this number? YES     Phone number patient can be reached at: Cell number on file:        Telephone Information:   Mobile [phone]         Best Time: any                Call taken on 9/17/2020 at 10:13 AM by Marcos Kimble [phone]  Reva Cao 7 minutes ago (10:12 AM)

## 2020-09-18 RX ORDER — METFORMIN HCL 500 MG
1000 TABLET, EXTENDED RELEASE 24 HR ORAL
Qty: 180 TABLET | Refills: 1 | Status: SHIPPED | OUTPATIENT
Start: 2020-09-18 | End: 2021-03-24

## 2020-10-20 ENCOUNTER — OFFICE VISIT (OUTPATIENT)
Dept: FAMILY MEDICINE | Facility: CLINIC | Age: 80
End: 2020-10-20
Payer: MEDICARE

## 2020-10-20 VITALS
WEIGHT: 173 LBS | HEART RATE: 69 BPM | OXYGEN SATURATION: 96 % | TEMPERATURE: 97.3 F | DIASTOLIC BLOOD PRESSURE: 64 MMHG | SYSTOLIC BLOOD PRESSURE: 116 MMHG | BODY MASS INDEX: 23.28 KG/M2

## 2020-10-20 DIAGNOSIS — Z00.00 ENCOUNTER FOR MEDICARE ANNUAL WELLNESS EXAM: Primary | ICD-10-CM

## 2020-10-20 DIAGNOSIS — E11.69 TYPE 2 DIABETES MELLITUS WITH OTHER SPECIFIED COMPLICATION, WITHOUT LONG-TERM CURRENT USE OF INSULIN (H): ICD-10-CM

## 2020-10-20 LAB
ANION GAP SERPL CALCULATED.3IONS-SCNC: 4 MMOL/L (ref 3–14)
BUN SERPL-MCNC: 25 MG/DL (ref 7–30)
CALCIUM SERPL-MCNC: 9.4 MG/DL (ref 8.5–10.1)
CHLORIDE SERPL-SCNC: 105 MMOL/L (ref 94–109)
CHOLEST SERPL-MCNC: 171 MG/DL
CO2 SERPL-SCNC: 29 MMOL/L (ref 20–32)
CREAT SERPL-MCNC: 1.12 MG/DL (ref 0.66–1.25)
CREAT UR-MCNC: 237 MG/DL
GFR SERPL CREATININE-BSD FRML MDRD: 61 ML/MIN/{1.73_M2}
GLUCOSE SERPL-MCNC: 135 MG/DL (ref 70–99)
HBA1C MFR BLD: 6.5 % (ref 0–5.6)
HDLC SERPL-MCNC: 36 MG/DL
LDLC SERPL CALC-MCNC: 115 MG/DL
MICROALBUMIN UR-MCNC: 11 MG/L
MICROALBUMIN/CREAT UR: 4.56 MG/G CR (ref 0–17)
NONHDLC SERPL-MCNC: 135 MG/DL
POTASSIUM SERPL-SCNC: 4.2 MMOL/L (ref 3.4–5.3)
SODIUM SERPL-SCNC: 138 MMOL/L (ref 133–144)
TRIGL SERPL-MCNC: 100 MG/DL

## 2020-10-20 PROCEDURE — 80048 BASIC METABOLIC PNL TOTAL CA: CPT | Performed by: FAMILY MEDICINE

## 2020-10-20 PROCEDURE — 90662 IIV NO PRSV INCREASED AG IM: CPT | Performed by: FAMILY MEDICINE

## 2020-10-20 PROCEDURE — 83036 HEMOGLOBIN GLYCOSYLATED A1C: CPT | Performed by: FAMILY MEDICINE

## 2020-10-20 PROCEDURE — G0008 ADMIN INFLUENZA VIRUS VAC: HCPCS | Performed by: FAMILY MEDICINE

## 2020-10-20 PROCEDURE — 36415 COLL VENOUS BLD VENIPUNCTURE: CPT | Performed by: FAMILY MEDICINE

## 2020-10-20 PROCEDURE — G0439 PPPS, SUBSEQ VISIT: HCPCS | Performed by: FAMILY MEDICINE

## 2020-10-20 PROCEDURE — 82043 UR ALBUMIN QUANTITATIVE: CPT | Performed by: FAMILY MEDICINE

## 2020-10-20 PROCEDURE — 80061 LIPID PANEL: CPT | Performed by: FAMILY MEDICINE

## 2020-10-20 NOTE — PATIENT INSTRUCTIONS
Patient Education   Personalized Prevention Plan  You are due for the preventive services outlined below.  Your care team is available to assist you in scheduling these services.  If you have already completed any of these items, please share that information with your care team to update in your medical record.  Health Maintenance Due   Topic Date Due     Discuss Advance Care Planning  1940     Hepatitis B Vaccine (1 of 3 - Risk 3-dose series) 02/20/1958     Zoster (Shingles) Vaccine (2 of 3) 01/01/2014     Flu Vaccine (1) 09/01/2020     Kidney Microalbumin Urine Test  09/09/2020     Diabetic Foot Exam  09/17/2020     FALL RISK ASSESSMENT  09/24/2020

## 2020-10-20 NOTE — PROGRESS NOTES
"  SUBJECTIVE:   Marcos Downing is a 80 year old male who presents for Preventive Visit.      Patient has been advised of split billing requirements and indicates understanding: Yes  Are you in the first 12 months of your Medicare Part B coverage?  No    Physical Health:    In general, how would you rate your overall physical health? excellent    Outside of work, how many days during the week do you exercise? 6-7 days/week    Outside of work, approximately how many minutes a day do you exercise?30-45 minutes    If you drink alcohol do you typically have >3 drinks per day or >7 drinks per week? Not Applicable    Do you usually eat at least 4 servings of fruit and vegetables a day, include whole grains & fiber and avoid regularly eating high fat or \"junk\" foods? Yes    Do you have any problems taking medications regularly?  No    Do you have any side effects from medications? not applicable    Needs assistance for the following daily activities: no assistance needed    Which of the following safety concerns are present in your home?  none identified     Hearing impairment: No    In the past 6 months, have you been bothered by leaking of urine? no    Mental Health:    In general, how would you rate your overall mental or emotional health? excellent  PHQ-2 Score:      Do you feel safe in your environment? Yes    Have you ever done Advance Care Planning? (For example, a Health Directive, POLST, or a discussion with a medical provider or your loved ones about your wishes): Yes, advance care planning is on file.    Additional concerns to address?  No    Fall risk:     click delete button to remove this line now  Cognitive Screenin) Repeat 3 items (Leader, Season, Table)    2) Clock draw: NORMAL  3) 3 item recall: Recalls 3 object   Results: NORMAL clock, 1-2 items recalled: COGNITIVE IMPAIRMENT LESS LIKELY    Mini-CogTM Copyright AVA Downing. Licensed by the author for use in [facility]; reprinted with " permission (shana@[facility]). All rights reserved.      Do you have sleep apnea, excessive snoring or daytime drowsiness?: no    Function   Living arrangement: Lives in own house, not needing help  Ambulation: Not needing any help, walks a mile daily  Vision: uses glasses stable, has appointment Dec 7  Hearing: Good  Bladder control: Good, occasional accidents  Memory: Good, still remembers details   ADLs: Good  Driving: still active    -------------------------------------  Wellness:   ACP, Shingrix, Hep B, Flu, fall  DM: Albumin, Foot,   (BMP, LDL, A1c)   FIT 2018: was normal  DEXA: ? Defers    Diabetes Follow-up    How often are you checking your blood sugar? A few times a week  What time of day are you checking your blood sugars (select all that apply)?  Before meals  Have you had any blood sugars above 200?  No  Have you had any blood sugars below 70?  No    What symptoms do you notice when your blood sugar is low?  None    What concerns do you have today about your diabetes? None     Do you have any of these symptoms? (Select all that apply)  No numbness or tingling in feet.  No redness, sores or blisters on feet.  No complaints of excessive thirst.  No reports of blurry vision.  No significant changes to weight.      BP Readings from Last 2 Encounters:   10/20/20 116/64   20 126/74     Hemoglobin A1C (%)   Date Value   10/20/2020 6.5 (H)   2020 7.6 (H)     LDL Cholesterol Calculated (mg/dL)   Date Value   10/20/2020 115 (H)   2020 106 (H)       Reviewed and updated as needed this visit by clinical staff   Allergies  Meds              Reviewed and updated as needed this visit by Provider                Social History     Tobacco Use     Smoking status: Former Smoker     Quit date: 1971     Years since quittin.5     Smokeless tobacco: Never Used   Substance Use Topics     Alcohol use: Yes     Comment: special occasions only                           Current providers sharing in care  for this patient include:   Patient Care Team:  Lyndon Patrick MD as PCP - General (Family Practice)  Lyndon Patrick MD as Assigned PCP    The following health maintenance items are reviewed in Epic and correct as of today:  Health Maintenance   Topic Date Due     ADVANCE CARE PLANNING  1940     HEPATITIS B IMMUNIZATION (1 of 3 - Risk 3-dose series) 1958     ZOSTER IMMUNIZATION (2 of 3) 2014     INFLUENZA VACCINE (1) 2020     MICROALBUMIN  2020     DIABETIC FOOT EXAM  2020     FALL RISK ASSESSMENT  2020     MEDICARE ANNUAL WELLNESS VISIT  2020     A1C  2020     DTAP/TDAP/TD IMMUNIZATION (2 - Td) 2021     BMP  2021     LIPID  2021     EYE EXAM  2021     PHQ-2  Completed     Pneumococcal Vaccine: 65+ Years  Completed     Pneumococcal Vaccine: Pediatrics (0 to 5 Years) and At-Risk Patients (6 to 64 Years)  Aged Out     IPV IMMUNIZATION  Aged Out     MENINGITIS IMMUNIZATION  Aged Out     Patient Active Problem List   Diagnosis     Hyperlipidemia with target LDL less than 100     Type 2 diabetes mellitus with hyperglycemia, without long-term current use of insulin (H)     Type 2 diabetes mellitus without retinopathy (H)     Glaucoma suspect, bilateral - treated     Posterior vitreous detachment, left     Combined form of age-related cataract, mild, both eyes     Past Surgical History:   Procedure Laterality Date     CYSTOSCOPY, DILATE URETHRA, COMBINED          TONSILLECTOMY      T & A  age 3     VASECTOMY  Age 39       Social History     Tobacco Use     Smoking status: Former Smoker     Quit date: 1971     Years since quittin.5     Smokeless tobacco: Never Used   Substance Use Topics     Alcohol use: Yes     Comment: special occasions only     Family History   Problem Relation Age of Onset     Diabetes Mother      Heart Disease Mother      Diabetes Father      Diabetes Brother      Cancer Brother      Cancer Sister  "     Diabetes Sister      Diabetes Brother      Glaucoma No family hx of      Macular Degeneration No family hx of          Pneumonia Vaccine:Adults age 65+ who received Pneumovax (PPSV23) at 65 years or older: Should be given PCV13 > 1 year after their most recent PPSV23    ROS:  Constitutional, HEENT, cardiovascular, pulmonary, gi and gu systems are negative, except as otherwise noted.    OBJECTIVE:   There were no vitals taken for this visit. Estimated body mass index is 25.43 kg/m  as calculated from the following:    Height as of 1/21/20: 1.836 m (6' 0.28\").    Weight as of 1/21/20: 85.7 kg (189 lb).  EXAM:   GENERAL: healthy, alert and no distress  EYES: Eyes grossly normal to inspection, PERRL and conjunctivae and sclerae normal  HENT: ear canals and TM's normal, nose and mouth without ulcers or lesions  NECK: no adenopathy, no asymmetry, masses, or scars and thyroid normal to palpation  RESP: lungs clear to auscultation - no rales, rhonchi or wheezes  CV: regular rate and rhythm, normal S1 S2, no S3 or S4, no murmur, click or rub, no peripheral edema and peripheral pulses strong  ABDOMEN: soft, nontender, no hepatosplenomegaly, no masses and bowel sounds normal  MS: no gross musculoskeletal defects noted, no edema  SKIN: no suspicious lesions or rashes  NEURO: Normal strength and tone, mentation intact and speech normal  PSYCH: mentation appears normal, affect normal/bright    Diagnostic Test Results:  Labs reviewed in Epic    ASSESSMENT / PLAN:   Marcos was seen today for wellness visit.    Diagnoses and all orders for this visit:    Encounter for Medicare annual wellness exam    Type 2 diabetes mellitus with other specified complication, without long-term current use of insulin (H)  -     Albumin Random Urine Quantitative with Creat Ratio  -     Hemoglobin A1c  -     Lipid Profile (Chol, Trig, HDL, LDL calc)  -     Basic metabolic panel    Other orders  -     FLUZONE HIGH DOSE 65+  [[id number]]      Patient has " "been advised of split billing requirements and indicates understanding: Yes    COUNSELING:  Reviewed preventive health counseling, as reflected in patient instructions       Regular exercise       Healthy diet/nutrition       Immunizations    Vaccinated for: Influenza          Estimated body mass index is 25.43 kg/m  as calculated from the following:    Height as of 1/21/20: 1.836 m (6' 0.28\").    Weight as of 1/21/20: 85.7 kg (189 lb).    Weight management plan: Discussed healthy diet and exercise guidelines    He reports that he quit smoking about 49 years ago. He has never used smokeless tobacco.    Appropriate preventive services were discussed with this patient, including applicable screening as appropriate for cardiovascular disease, diabetes, osteopenia/osteoporosis, and glaucoma.  As appropriate for age/gender, discussed screening for colorectal cancer, prostate cancer, breast cancer, and cervical cancer. Checklist reviewing preventive services available has been given to the patient.    Reviewed patients plan of care and provided an AVS. The Basic Care Plan (routine screening as documented in Health Maintenance) for Marcos meets the Care Plan requirement. This Care Plan has been established and reviewed with the Patient.    Counseling Resources:  ATP IV Guidelines  Pooled Cohorts Equation Calculator  Breast Cancer Risk Calculator  BRCA-Related Cancer Risk Assessment: FHS-7 Tool  FRAX Risk Assessment  ICSI Preventive Guidelines  Dietary Guidelines for Americans, 2010  USDA's MyPlate  ASA Prophylaxis  Lung CA Screening    Lyndon Patrick MD  [facility]  "

## 2020-10-20 NOTE — LETTER
October 21, 2020      Marcos Downing  [address] [facility] [address]        Dear ,    We are writing to inform you of your test results.  Essentially normal results:  Resulted Orders   Albumin Random Urine Quantitative with Creat Ratio   Result Value Ref Range    Creatinine Urine 237 mg/dL    Albumin Urine mg/L 11 mg/L    Albumin Urine mg/g Cr 4.56 0 - 17 mg/g Cr   Hemoglobin A1c   Result Value Ref Range    Hemoglobin A1C 6.5 (H) 0 - 5.6 %      Comment:      Normal <5.7% Prediabetes 5.7-6.4%  Diabetes 6.5% or higher - adopted from ADA   consensus guidelines.     Lipid Profile (Chol, Trig, HDL, LDL calc)   Result Value Ref Range    Cholesterol 171 <200 mg/dL    Triglycerides 100 <150 mg/dL      Comment:      Fasting specimen    HDL Cholesterol 36 (L) >39 mg/dL    LDL Cholesterol Calculated 115 (H) <100 mg/dL      Comment:      Above desirable:  100-129 mg/dl  Borderline High:  130-159 mg/dL  High:             160-189 mg/dL  Very high:       >189 mg/dl      Non HDL Cholesterol 135 (H) <130 mg/dL      Comment:      Above Desirable:  130-159 mg/dl  Borderline high:  160-189 mg/dl  High:             190-219 mg/dl  Very high:       >219 mg/dl     Basic metabolic panel   Result Value Ref Range    Sodium 138 133 - 144 mmol/L    Potassium 4.2 3.4 - 5.3 mmol/L    Chloride 105 94 - 109 mmol/L    Carbon Dioxide 29 20 - 32 mmol/L    Anion Gap 4 3 - 14 mmol/L    Glucose 135 (H) 70 - 99 mg/dL      Comment:      Fasting specimen    Urea Nitrogen 25 7 - 30 mg/dL    Creatinine 1.12 0.66 - 1.25 mg/dL    GFR Estimate 61 >60 mL/min/[1.73_m2]      Comment:      Non  GFR Calc  Starting 12/18/2018, serum creatinine based estimated GFR (eGFR) will be   calculated using the Chronic Kidney Disease Epidemiology Collaboration   (CKD-EPI) equation.      GFR Estimate If Black 71 >60 mL/min/[1.73_m2]      Comment:       GFR Calc  Starting 12/18/2018, serum creatinine based estimated  GFR (eGFR) will be   calculated using the Chronic Kidney Disease Epidemiology Collaboration   (CKD-EPI) equation.      Calcium 9.4 8.5 - 10.1 mg/dL       If you have any questions or concerns, please call the clinic at the number listed above.       Sincerely,        Lydnon Patrick MD/miguel

## 2021-01-07 ENCOUNTER — OFFICE VISIT (OUTPATIENT)
Dept: OPHTHALMOLOGY | Facility: CLINIC | Age: 81
End: 2021-01-07
Payer: MEDICARE

## 2021-01-07 DIAGNOSIS — H40.003 GLAUCOMA SUSPECT, BILATERAL: Primary | ICD-10-CM

## 2021-01-07 PROCEDURE — 92083 EXTENDED VISUAL FIELD XM: CPT | Performed by: OPHTHALMOLOGY

## 2021-01-07 PROCEDURE — 92012 INTRM OPH EXAM EST PATIENT: CPT | Performed by: OPHTHALMOLOGY

## 2021-01-07 PROCEDURE — 92133 CPTRZD OPH DX IMG PST SGM ON: CPT | Performed by: OPHTHALMOLOGY

## 2021-01-07 ASSESSMENT — REFRACTION_WEARINGRX
OD_CYLINDER: +0.75
OS_CYLINDER: +0.50
OD_SPHERE: +1.75
OS_ADD: +2.75
SPECS_TYPE: BIFOCAL
OD_SPHERE: +4.25
OD_AXIS: 173
OS_AXIS: 006
SPECS_TYPE: RDNG
OS_CYLINDER: +0.50
OD_CYLINDER: +0.75
OS_SPHERE: +4.00
OS_SPHERE: +1.50
OD_ADD: +2.75
OD_AXIS: 167
OS_AXIS: 004

## 2021-01-07 ASSESSMENT — TONOMETRY
OS_IOP_MMHG: 13
OD_IOP_MMHG: 11
IOP_METHOD: APPLANATION

## 2021-01-07 ASSESSMENT — VISUAL ACUITY
METHOD: SNELLEN - LINEAR
OS_CC: 20/40
OD_CC+: -2
OD_CC: 20/30
OS_CC+: -1
CORRECTION_TYPE: GLASSES

## 2021-01-07 ASSESSMENT — PACHYMETRY
OD_CT(UM): 598
OS_CT(UM): 578

## 2021-01-07 NOTE — PROGRESS NOTES
Current Eye Medications:  Latanoprost at bedtime both eyes, last took 11:30pm.     Subjective:  6 month follow up for IOP check, Glaucoma OCT and Blankenship Visual Field. Patient feel vision is doing pretty well distance and near with glasses. No eye pain or discomfort in either eye.      Objective:  See Ophthalmology Exam.       Assessment:  Stable intraocular pressure, glaucoma OCT, and Blankenship Visual Field both eyes in patient who is a treated glaucoma suspect.      Plan:  Continue Latanoprost drop both eyes at bedtime.  Return visit 6 months for a complete exam.  Regis Castillo M.D.  [phone]

## 2021-01-07 NOTE — LETTER
1/7/2021         RE: Marcos Downing  [address] [place]. [facility]"" [id number]        Dear Colleague,    Thank you for referring your patient, Marcos Downing, to the [facility]. Please see a copy of my visit note below.     Current Eye Medications:  Latanoprost at bedtime both eyes, last took 11:30pm.     Subjective:  6 month follow up for IOP check, Glaucoma OCT and Blankenship Visual Field. Patient feel vision is doing pretty well distance and near with glasses. No eye pain or discomfort in either eye.      Objective:  See Ophthalmology Exam.       Assessment:  Stable intraocular pressure, glaucoma OCT, and Blankenship Visual Field both eyes in patient who is a treated glaucoma suspect.      Plan:  Continue Latanoprost drop both eyes at bedtime.  Return visit 6 months for a complete exam.  Regis Castillo M.D.  [phone]               Again, thank you for allowing me to participate in the care of your patient.        Sincerely,        Regis Castillo MD

## 2021-01-07 NOTE — PATIENT INSTRUCTIONS
Continue Latanoprost drop both eyes at bedtime.  Return visit 6 months for a complete exam.  Regis Castillo M.D.  [phone]

## 2021-01-08 ASSESSMENT — SLIT LAMP EXAM - LIDS
COMMENTS: 2+ DERMATOCHALASIS - UPPER LID
COMMENTS: 2+ DERMATOCHALASIS - UPPER LID

## 2021-01-08 ASSESSMENT — EXTERNAL EXAM - LEFT EYE: OS_EXAM: 1+ BROW PTOSIS

## 2021-01-08 ASSESSMENT — EXTERNAL EXAM - RIGHT EYE: OD_EXAM: 1+ BROW PTOSIS

## 2021-03-22 DIAGNOSIS — H40.003 GLAUCOMA SUSPECT, BILATERAL: ICD-10-CM

## 2021-03-22 DIAGNOSIS — E11.65 TYPE 2 DIABETES MELLITUS WITH HYPERGLYCEMIA, WITHOUT LONG-TERM CURRENT USE OF INSULIN (H): ICD-10-CM

## 2021-03-22 RX ORDER — LATANOPROST 50 UG/ML
1 SOLUTION/ DROPS OPHTHALMIC AT BEDTIME
Qty: 2.5 ML | Refills: 11 | Status: SHIPPED | OUTPATIENT
Start: 2021-03-22 | End: 2022-05-13

## 2021-03-22 NOTE — TELEPHONE ENCOUNTER
Recommend refilling drops per last visit note with Dr. Castillo 1/7/21:Continue Latanoprost drop both eyes at bedtime.  Has follow up appointment on 7/8/1921 with Dr. Castillo.

## 2021-03-24 DIAGNOSIS — E11.65 TYPE 2 DIABETES MELLITUS WITH HYPERGLYCEMIA, WITHOUT LONG-TERM CURRENT USE OF INSULIN (H): ICD-10-CM

## 2021-03-24 RX ORDER — METFORMIN HCL 500 MG
1000 TABLET, EXTENDED RELEASE 24 HR ORAL
Qty: 180 TABLET | Refills: 0 | Status: SHIPPED | OUTPATIENT
Start: 2021-03-24 | End: 2021-06-24

## 2021-03-24 RX ORDER — METFORMIN HCL 500 MG
1000 TABLET, EXTENDED RELEASE 24 HR ORAL
Qty: 180 TABLET | Refills: 0 | Status: SHIPPED | OUTPATIENT
Start: 2021-03-24 | End: 2021-03-31

## 2021-03-24 NOTE — TELEPHONE ENCOUNTER
Please send ASAP    Patient is in the clinic and needs to pick this up from our on site Pharmacy.     metFORMIN (GLUCOPHAGE-XR) 500 MG 24 hr tablet     [facility] [phone]    Yelitza Quiñonez,

## 2021-03-30 NOTE — PROGRESS NOTES
Assessment & Plan   Marcos is a 80 yo M withDM2, hx of neprolithiasis, hpld, glaucoma/cataract, former smoker    Type 2 diabetes mellitus with other specified complication, without long-term current use of insulin (H)  Diabetes well controlled.  Starting as much as can lead a healthy lifestyle.  Commended him for his efforts and will continue the current treatment regimen.  - FOOT EXAM.    Healthcare Maintenance  He is due for shingles shot and tetanus shot; will defer because he had his last Covid shot but to 3 weeks ago     Return in about 3 months (around 6/30/2021) for Routine Visit.    Lyndon Patrick MD  [facility]      Murali Rodríguez is a 81 year old who presents for the following health issues:    HPI     Diabetes Follow-up  Takes Metformin 500 mg twice a day  How often are you checking your blood sugar? A few times a week  What time of day are you checking your blood sugars (select all that apply)?  Before meals  Have you had any blood sugars above 200?  No  Have you had any blood sugars below 70?  No    What symptoms do you notice when your blood sugar is low?  None    What concerns do you have today about your diabetes? None     Do you have any of these symptoms? (Select all that apply)  No numbness or tingling in feet.  No redness, sores or blisters on feet.  No complaints of excessive thirst.  No reports of blurry vision.  No significant changes to weight.      BP Readings from Last 2 Encounters:   03/31/21 114/60   10/20/20 116/64     Hemoglobin A1C (%)   Date Value   03/31/2021 6.7 (H)   10/20/2020 6.5 (H)     LDL Cholesterol Calculated (mg/dL)   Date Value   10/20/2020 115 (H)   06/16/2020 106 (H)     Shots:   Shingrix and TDAP: had Covid vaccine 3 weeks ago; will wait at least 4 weeks        How many servings of fruits and vegetables do you eat daily?  2-3    On average, how many sweetened beverages do you drink each day (Examples: soda, juice, sweet tea, etc.   Do NOT count diet or artificially sweetened beverages)?   0    How many days per week do you exercise enough to make your heart beat faster? 7    How many minutes a day do you exercise enough to make your heart beat faster? 20 - 29    How many days per week do you miss taking your medication? 0      Review of Systems   Constitutional, HEENT, cardiovascular, pulmonary, gi and gu systems are negative, except as otherwise noted.      Objective    /60   Pulse 68   Temp 97.6  F (36.4  C) (Oral)   Wt 79.4 kg (175 lb)   SpO2 96%   BMI 23.55 kg/m    Body mass index is 23.55 kg/m .  Physical Exam   GENERAL: healthy, alert and no distress  RESP: lungs clear to auscultation - no rales, rhonchi or wheezes  CV: regular rate and rhythm, no murmur, click or rub, no peripheral edema   PSYCH: mentation appears normal, affect normal/bright  Diabetic foot exam: weak DP and PT pulses, no trophic changes or ulcerative lesions and normal sensory exam

## 2021-03-31 ENCOUNTER — OFFICE VISIT (OUTPATIENT)
Dept: FAMILY MEDICINE | Facility: CLINIC | Age: 81
End: 2021-03-31
Payer: MEDICARE

## 2021-03-31 VITALS
SYSTOLIC BLOOD PRESSURE: 114 MMHG | WEIGHT: 175 LBS | DIASTOLIC BLOOD PRESSURE: 60 MMHG | OXYGEN SATURATION: 96 % | BODY MASS INDEX: 23.55 KG/M2 | HEART RATE: 68 BPM | TEMPERATURE: 97.6 F

## 2021-03-31 DIAGNOSIS — E11.69 TYPE 2 DIABETES MELLITUS WITH OTHER SPECIFIED COMPLICATION, WITHOUT LONG-TERM CURRENT USE OF INSULIN (H): Primary | ICD-10-CM

## 2021-03-31 LAB — HBA1C MFR BLD: 6.7 % (ref 0–5.6)

## 2021-03-31 PROCEDURE — 99213 OFFICE O/P EST LOW 20 MIN: CPT | Performed by: FAMILY MEDICINE

## 2021-03-31 PROCEDURE — 36415 COLL VENOUS BLD VENIPUNCTURE: CPT | Performed by: FAMILY MEDICINE

## 2021-03-31 PROCEDURE — 99207 PR FOOT EXAM NO CHARGE: CPT | Performed by: FAMILY MEDICINE

## 2021-03-31 PROCEDURE — 83036 HEMOGLOBIN GLYCOSYLATED A1C: CPT | Performed by: FAMILY MEDICINE

## 2021-05-18 NOTE — PATIENT INSTRUCTIONS
Monitor, Keep blood sugar under control  Sent letter to primary care provider regarding diabetes    Referral to ophthalmology to repeat visual fields and OCT (further testing for glaucoma) to see if there are any changes since last year    Monitor cataracts by having yearly exams.  Wear sunglasses when outside.    A posterior vitreous detachment is nothing to worry about.  You have a jelly like substance that fills the inside of the eye, as you get older, that gel shrinks a little and when it shrinks, it pulls away from the back of the eye.  When it pulls away you can see a floater, as time goes on, the floater may shrink down out of your line of sight and you won't notice it so often.  There is a little greater risk of developing a retinal detachment since there's nothing pressing against the retina, so if you start to notice flashes of light or sudden vision changes, return to the clinic as soon as possible, otherwise return as needed.    A final glasses prescription was given.  Allow time for adaptation.  The glasses may cause dizziness and affect depth perception for awhile.  Return to clinic 1 year for Comprehensive Vision Exam      Windy Phoenix O.D  [facility] [place]  [address]    [phone]         patient

## 2021-06-23 DIAGNOSIS — E11.65 TYPE 2 DIABETES MELLITUS WITH HYPERGLYCEMIA, WITHOUT LONG-TERM CURRENT USE OF INSULIN (H): ICD-10-CM

## 2021-06-24 RX ORDER — METFORMIN HCL 500 MG
1000 TABLET, EXTENDED RELEASE 24 HR ORAL
Qty: 180 TABLET | Refills: 0 | Status: SHIPPED | OUTPATIENT
Start: 2021-06-24 | End: 2021-10-29

## 2021-07-08 ENCOUNTER — OFFICE VISIT (OUTPATIENT)
Dept: OPHTHALMOLOGY | Facility: CLINIC | Age: 81
End: 2021-07-08
Payer: MEDICARE

## 2021-07-08 DIAGNOSIS — E11.9 TYPE 2 DIABETES MELLITUS WITHOUT RETINOPATHY (H): Primary | ICD-10-CM

## 2021-07-08 DIAGNOSIS — Z01.01 ENCOUNTER FOR EXAMINATION OF EYES AND VISION WITH ABNORMAL FINDINGS: ICD-10-CM

## 2021-07-08 DIAGNOSIS — H40.003 GLAUCOMA SUSPECT, BILATERAL: ICD-10-CM

## 2021-07-08 DIAGNOSIS — H52.4 PRESBYOPIA: ICD-10-CM

## 2021-07-08 DIAGNOSIS — H25.813 COMBINED FORM OF AGE-RELATED CATARACT, BOTH EYES: ICD-10-CM

## 2021-07-08 DIAGNOSIS — H43.812 POSTERIOR VITREOUS DETACHMENT, LEFT: ICD-10-CM

## 2021-07-08 PROCEDURE — 92014 COMPRE OPH EXAM EST PT 1/>: CPT | Performed by: OPHTHALMOLOGY

## 2021-07-08 PROCEDURE — 92015 DETERMINE REFRACTIVE STATE: CPT | Mod: GY | Performed by: OPHTHALMOLOGY

## 2021-07-08 ASSESSMENT — REFRACTION_MANIFEST
OS_CYLINDER: +1.00
OS_ADD: +2.75
OS_AXIS: 005
OS_SPHERE: +1.50
OD_AXIS: 160
OD_CYLINDER: +1.00
OD_SPHERE: +1.25
OD_ADD: +2.75

## 2021-07-08 ASSESSMENT — VISUAL ACUITY
OS_BAT_LOW: 20/40
OS_BAT_MED: 20/50-1
OD_CC+: -1
OS_CC: 20/50
METHOD: SNELLEN - LINEAR
OD_PH_CC: 20/30
OD_BAT_MED: 20/50
OS_PH_CC: 20/30
CORRECTION_TYPE: GLASSES
OS_BAT_HIGH: 20/125
OD_CC: 20/40
OD_BAT_LOW: 20/50-1
OD_BAT_HIGH: 20/100
OD_PH_CC+: -2
OS_CC+: -1

## 2021-07-08 ASSESSMENT — EXTERNAL EXAM - RIGHT EYE: OD_EXAM: 1+ BROW PTOSIS, MILD TO MOD BROW

## 2021-07-08 ASSESSMENT — CUP TO DISC RATIO
OD_RATIO: 0.6
OS_RATIO: 0.7

## 2021-07-08 ASSESSMENT — REFRACTION_WEARINGRX
OS_SPHERE: +1.50
SPECS_TYPE: BIFOCAL
SPECS_TYPE: RDNG
OD_ADD: +2.75
OD_SPHERE: +1.75
OS_AXIS: 004
OS_SPHERE: +4.00
OD_SPHERE: +4.25
OD_CYLINDER: +0.75
OS_AXIS: 006
OS_CYLINDER: +0.50
OS_ADD: +2.75
OD_AXIS: 167
OD_CYLINDER: +0.75
OD_AXIS: 173
OS_CYLINDER: +0.50

## 2021-07-08 ASSESSMENT — PACHYMETRY
OS_CT(UM): 578
OD_CT(UM): 598

## 2021-07-08 ASSESSMENT — SLIT LAMP EXAM - LIDS
COMMENTS: 2+ DERMATOCHALASIS - UPPER LID, 2+ SCLERAL SHOW
COMMENTS: 2+ DERMATOCHALASIS - UPPER LID, 2+ SCLERAL SHOW

## 2021-07-08 ASSESSMENT — TONOMETRY
IOP_METHOD: APPLANATION
OS_IOP_MMHG: 18
OD_IOP_MMHG: 14

## 2021-07-08 ASSESSMENT — EXTERNAL EXAM - LEFT EYE: OS_EXAM: 1+ BROW PTOSIS, MILD TO MOD BROW

## 2021-07-08 ASSESSMENT — CONF VISUAL FIELD
METHOD: COUNTING FINGERS
OS_NORMAL: 1
OD_NORMAL: 1

## 2021-07-08 NOTE — PROGRESS NOTES
Current Eye Medications:  Latanoprost at bedtime both eyes, last took around midnight.      Subjective:  Complete eye exam. Vision is little blurry in distance both eyes, last 3-6 months. Vision is OK both eyes at near. Patient would like to get new glasses this time. No eye pain or discomfort in either eye.   Pre-diabetic for last 4-5 years.    Lab Results   Component Value Date    A1C 6.7 03/31/2021    A1C 6.5 10/20/2020    A1C 7.6 06/16/2020    A1C 8.7 01/09/2020    A1C 7.3 09/09/2019        Objective:  See Ophthalmology Exam.       Assessment:  Stable intraocular pressure and discs in patient who is a treated glaucoma suspect.  No diabetic retinopathy.  Stable cataract both eyes.      ICD-10-CM    1. Type 2 diabetes mellitus without retinopathy (H)  E11.9    2. Combined form of age-related cataract, mild, both eyes  H25.813    3. Glaucoma suspect, bilateral - treated  H40.003    4. Posterior vitreous detachment, left  H43.812    5. Encounter for examination of eyes and vision with abnormal findings  Z01.01    6. Presbyopia  H52.4 EYE EXAM (SIMPLE-NONBILLABLE)     REFRACTION        Plan:  Continue same medication.  Glasses Rx given - optional.  May use artificial tears up to 4 times daily both eyes. (Refresh Tears, Systane Ultra/Balance, or Theratears).  Possible posterior vitreous detachment (sudden onset large floater and/or flashing lights) right eye discussed.  Return visit in 6 months for intraocular pressure, glaucoma OCT, retinal OCT, and Blankenship Visual Field.  Watch left eye carefully.  Regis Castillo M.D.  [phone]

## 2021-07-08 NOTE — PATIENT INSTRUCTIONS
Continue same medication.  Glasses Rx given - optional.  May use artificial tears up to 4 times daily both eyes. (Refresh Tears, Systane Ultra/Balance, or Theratears).  Possible posterior vitreous detachment (sudden onset large floater and/or flashing lights) right eye discussed.  Return visit in 6 months for intraocular pressure, glaucoma OCT, retinal OCT, and Blankenship Visual Field  Regis Castillo M.D.  [phone]    Patient Education   Diabetes weakens the blood vessels all over the body, including the eyes. Damage to the blood vessels in the eyes can cause swelling or bleeding into part of the eye (called the retina). This is called diabetic retinopathy (SHALONDA-tin--puh-thee). If not treated, this disease can cause vision loss or blindness.   Symptoms may include blurred or distorted vision, but many people have no symptoms. It's important to see your eye doctor regularly to check for problems.   Early treatment and good control can help protect your vision. Here are the things you can do to help prevent vision loss:      1. Keep your blood sugar levels under tight control.      2. Bring high blood pressure under control.      3. No smoking.      4. Have yearly dilated eye exams.

## 2021-07-08 NOTE — LETTER
7/8/2021         RE: Marcos Downing  [address] [facility] [address]        Dear Colleague,    Thank you for referring your patient, Marcos Downing, to the [facility]. Please see a copy of my visit note below.     Current Eye Medications:  Latanoprost at bedtime both eyes, last took around midnight.      Subjective:  Complete eye exam. Vision is little blurry in distance both eyes, last 3-6 months. Vision is OK both eyes at near. Patient would like to get new glasses this time. No eye pain or discomfort in either eye.   Pre-diabetic for last 4-5 years.    Lab Results   Component Value Date    A1C 6.7 03/31/2021    A1C 6.5 10/20/2020    A1C 7.6 06/16/2020    A1C 8.7 01/09/2020    A1C 7.3 09/09/2019        Objective:  See Ophthalmology Exam.       Assessment:  Stable intraocular pressure and discs in patient who is a treated glaucoma suspect.  No diabetic retinopathy.  Stable cataract both eyes.      ICD-10-CM    1. Type 2 diabetes mellitus without retinopathy (H)  E11.9    2. Combined form of age-related cataract, mild, both eyes  H25.813    3. Glaucoma suspect, bilateral - treated  H40.003    4. Posterior vitreous detachment, left  H43.812    5. Encounter for examination of eyes and vision with abnormal findings  Z01.01    6. Presbyopia  H52.4 EYE EXAM (SIMPLE-NONBILLABLE)     REFRACTION        Plan:  Continue same medication.  Glasses Rx given - optional.  May use artificial tears up to 4 times daily both eyes. (Refresh Tears, Systane Ultra/Balance, or Theratears).  Possible posterior vitreous detachment (sudden onset large floater and/or flashing lights) right eye discussed.  Return visit in 6 months for intraocular pressure, glaucoma OCT, retinal OCT, and Blankenship Visual Field.  Watch left eye carefully.  Regis Castillo M.D.  [phone]             Again, thank you for allowing me to participate in the care of your patient.        Sincerely,        Regis LEES  MD Jonathan

## 2021-10-25 DIAGNOSIS — E11.65 TYPE 2 DIABETES MELLITUS WITH HYPERGLYCEMIA, WITHOUT LONG-TERM CURRENT USE OF INSULIN (H): ICD-10-CM

## 2021-10-27 NOTE — TELEPHONE ENCOUNTER
Called pt and scheduled follow up on 11/9/21 at 12:40pm. Pt will need marcos refill.  Hilda Wright-  [place]

## 2021-10-27 NOTE — TELEPHONE ENCOUNTER
Silvina refill already sent and patient has not scheduled diabetic follow up, needs visit at least every 6 months.  Please assist patient with scheduling.     Jolanta Cuadra RN

## 2021-10-28 NOTE — TELEPHONE ENCOUNTER
"Routing refill request to provider for review/approval because:  Labs not current:  A1C, CR, GFR            Requested Prescriptions   Pending Prescriptions Disp Refills     metFORMIN (GLUCOPHAGE-XR) 500 MG 24 hr tablet [Pharmacy Med Name: METFORMIN HCL ER 500MG TB24] 180 tablet 0     Sig: TAKE 2 TABLETS (1,000 MG) BY MOUTH DAILY (WITH DINNER) +++NEED RECHECK+++       Biguanide Agents Failed - 10/27/2021  4:57 PM        Failed - Patient has documented A1c within the specified period of time.     If HgbA1C is 8 or greater, it needs to be on file within the past 3 months.  If less than 8, must be on file within the past 6 months.     Recent Labs   Lab Test 03/31/21  0921   A1C 6.7*             Failed - Patient's CR is NOT>1.4 OR Patient's EGFR is NOT<45 within past 12 mos.     Recent Labs   Lab Test 10/20/20  1016   GFRESTIMATED 61   GFRESTBLACK 71       Recent Labs   Lab Test 10/20/20  1016   CR 1.12             Passed - Patient is age 10 or older        Passed - Patient does NOT have a diagnosis of CHF.        Passed - Medication is active on med list        Passed - Recent (6 mo) or future (30 days) visit within the authorizing provider's specialty     Patient had office visit in the last 6 months or has a visit in the next 30 days with authorizing provider or within the authorizing provider's specialty.  See \"Patient Info\" tab in inbasket, or \"Choose Columns\" in Meds & Orders section of the refill encounter.               Bre Dailey RN on 10/28/2021 at 3:53 PM      "

## 2021-10-29 RX ORDER — METFORMIN HCL 500 MG
1000 TABLET, EXTENDED RELEASE 24 HR ORAL
Qty: 180 TABLET | Refills: 0 | Status: SHIPPED | OUTPATIENT
Start: 2021-10-29 | End: 2021-11-09

## 2021-11-09 ENCOUNTER — OFFICE VISIT (OUTPATIENT)
Dept: FAMILY MEDICINE | Facility: CLINIC | Age: 81
End: 2021-11-09
Payer: MEDICARE

## 2021-11-09 VITALS
BODY MASS INDEX: 23.62 KG/M2 | SYSTOLIC BLOOD PRESSURE: 131 MMHG | WEIGHT: 174.4 LBS | DIASTOLIC BLOOD PRESSURE: 73 MMHG | HEIGHT: 72 IN | OXYGEN SATURATION: 99 % | RESPIRATION RATE: 19 BRPM | HEART RATE: 71 BPM

## 2021-11-09 DIAGNOSIS — Z00.00 HEALTHCARE MAINTENANCE: ICD-10-CM

## 2021-11-09 DIAGNOSIS — E11.65 TYPE 2 DIABETES MELLITUS WITH HYPERGLYCEMIA, WITHOUT LONG-TERM CURRENT USE OF INSULIN (H): Primary | ICD-10-CM

## 2021-11-09 DIAGNOSIS — E78.5 HYPERLIPIDEMIA LDL GOAL <100: ICD-10-CM

## 2021-11-09 LAB
CREAT UR-MCNC: 117 MG/DL
HBA1C MFR BLD: 7.3 % (ref 0–5.6)
HOLD SPECIMEN: NORMAL
HOLD SPECIMEN: NORMAL
MICROALBUMIN UR-MCNC: 8 MG/L
MICROALBUMIN/CREAT UR: 6.84 MG/G CR (ref 0–17)

## 2021-11-09 PROCEDURE — 82043 UR ALBUMIN QUANTITATIVE: CPT | Performed by: FAMILY MEDICINE

## 2021-11-09 PROCEDURE — 99214 OFFICE O/P EST MOD 30 MIN: CPT | Performed by: FAMILY MEDICINE

## 2021-11-09 PROCEDURE — 36415 COLL VENOUS BLD VENIPUNCTURE: CPT | Performed by: FAMILY MEDICINE

## 2021-11-09 PROCEDURE — 83036 HEMOGLOBIN GLYCOSYLATED A1C: CPT | Performed by: FAMILY MEDICINE

## 2021-11-09 RX ORDER — METFORMIN HCL 500 MG
500 TABLET, EXTENDED RELEASE 24 HR ORAL
Qty: 180 TABLET | Refills: 0 | Status: SHIPPED | OUTPATIENT
Start: 2021-11-09 | End: 2021-11-10

## 2021-11-09 ASSESSMENT — MIFFLIN-ST. JEOR: SCORE: 1529.82

## 2021-11-09 ASSESSMENT — PAIN SCALES - GENERAL: PAINLEVEL: NO PAIN (0)

## 2021-11-09 NOTE — PROGRESS NOTES
Assessment & Plan     Type 2 diabetes mellitus with hyperglycemia, without long-term current use of insulin (H)   A1c well controlled but bumped up a little; been doing Metformin 500 mg daily, discussed watching diet closely and doing Metformin 1000 mg daily.  - REVIEW OF HEALTH MAINTENANCE PROTOCOL ORDERS  - HEMOGLOBIN A1C  - BASIC METABOLIC PANEL  - Lipid panel reflex to direct LDL Fasting  - Albumin Random Urine Quantitative with Creat Ratio  - metFORMIN (GLUCOPHAGE-XR) 500 MG 24 hr tablet; Take 2 tablets (1,000 mg) by mouth daily (with dinner) (Been taking 500 mg daily)    Hyperlipidemia LDL goal <100    Controlled by diet  - Lipid panel reflex to direct LDL Fasting    Healthcare maintenance    -  Due for shots; flu [facility]    Return in about 6 months (around 5/9/2022) for Routine Visit.    Lyndon Patrick MD  [facility] BRISEYDA Rodríguez is a 81 year old who presents for the following health issues   HPI     Diabetes Follow-up   Blood sugars been normal  Been taking Metformin 500 mg daily instead of the prescribed  1000 mg daily  How often are you checking your blood sugar? A few times a week  What time of day are you checking your blood sugars (select all that apply)?  Before meals  Have you had any blood sugars above 200?  No  Have you had any blood sugars below 70?  YES     What symptoms do you notice when your blood sugar is low?  None    What concerns do you have today about your diabetes? None     Do you have any of these symptoms? (Select all that apply)  No numbness or tingling in feet.  No redness, sores or blisters on feet.  No complaints of excessive thirst.  No reports of blurry vision.  No significant changes to weight.  BP Readings from Last 2 Encounters:   11/09/21 131/73   03/31/21 114/60     Hemoglobin A1C (%)   Date Value   03/31/2021 6.7 (H)   10/20/2020 6.5 (H)     LDL Cholesterol Calculated (mg/dL)   Date Value  "  10/20/2020 115 (H)   06/16/2020 106 (H)     Hyperlipidemia Follow-Up    Are you regularly taking any medication or supplement to lower your cholesterol?   No    Are you having muscle aches or other side effects that you think could be caused by your cholesterol lowering medication?  No    Shots:   [facility],   Does not do flu shot,.    Review of Systems   Constitutional, HEENT, cardiovascular, pulmonary, gi and gu systems are negative, except as otherwise noted.      Objective    /73 (BP Location: Right arm)   Pulse 71   Resp 19   Ht 1.83 m (6' 0.05\")   Wt 79.1 kg (174 lb 6.4 oz)   SpO2 99%   BMI 23.62 kg/m    Body mass index is 23.62 kg/m .  Physical Exam   GENERAL: healthy, alert and no distress  RESP: lungs clear to auscultation - no rales, rhonchi or wheezes  CV: regular rate and rhythm,, no murmur, click or rub, no peripheral edema   MS: no gross musculoskeletal defects noted, no edema    "

## 2021-11-10 RX ORDER — METFORMIN HCL 500 MG
1000 TABLET, EXTENDED RELEASE 24 HR ORAL
Qty: 180 TABLET | Refills: 1 | Status: SHIPPED | OUTPATIENT
Start: 2021-11-10 | End: 2022-03-25

## 2022-03-28 ENCOUNTER — TELEPHONE (OUTPATIENT)
Dept: FAMILY MEDICINE | Facility: CLINIC | Age: 82
End: 2022-03-28
Payer: MEDICARE

## 2022-03-28 DIAGNOSIS — E11.65 TYPE 2 DIABETES MELLITUS WITH HYPERGLYCEMIA, WITHOUT LONG-TERM CURRENT USE OF INSULIN (H): ICD-10-CM

## 2022-03-28 RX ORDER — METFORMIN HCL 500 MG
1000 TABLET, EXTENDED RELEASE 24 HR ORAL
Qty: 180 TABLET | Refills: 1 | Status: SHIPPED | OUTPATIENT
Start: 2022-03-28 | End: 2023-05-30

## 2022-03-28 NOTE — TELEPHONE ENCOUNTER
Patient arrived at pharmacy to refill metformin, It is too soon to refill by several days. [facility] spoke to patient and he reports his dose was increased from 2 tablets of the 500mg tablets to 4 per day. The last office visit from 11/21 states it was increased from one per day to two tablets daily. Please confirm current dose.   Thank you  Mayra Hebert [facility]  [address] [place] [address]  HENRRY Bonner [address]  [phone]

## 2022-03-28 NOTE — TELEPHONE ENCOUNTER
Yes we had discussed increasing dose of Metformin from 500 mg daily to 1000 mg daily; and was to use the ones he had first so could easily run out

## 2022-03-28 NOTE — TELEPHONE ENCOUNTER
Last office visit: 11/0//21:    Type 2 diabetes mellitus with hyperglycemia, without long-term current use of insulin (H)   A1c well controlled but bumped up a little; been doing Metformin 500 mg daily, discussed watching diet closely and doing Metformin 1000 mg daily.  - REVIEW OF HEALTH MAINTENANCE PROTOCOL ORDERS  - HEMOGLOBIN A1C  - BASIC METABOLIC PANEL  - Lipid panel reflex to direct LDL Fasting  - Albumin Random Urine Quantitative with Creat Ratio  - metFORMIN (GLUCOPHAGE-XR) 500 MG 24 hr tablet; Take 2 tablets (1,000 mg) by mouth daily (with dinner) (Been taking 500 mg daily)

## 2022-05-13 ENCOUNTER — OFFICE VISIT (OUTPATIENT)
Dept: FAMILY MEDICINE | Facility: CLINIC | Age: 82
End: 2022-05-13
Payer: MEDICARE

## 2022-05-13 VITALS
DIASTOLIC BLOOD PRESSURE: 65 MMHG | RESPIRATION RATE: 16 BRPM | BODY MASS INDEX: 22.19 KG/M2 | HEART RATE: 76 BPM | TEMPERATURE: 97.7 F | HEIGHT: 72 IN | WEIGHT: 163.8 LBS | SYSTOLIC BLOOD PRESSURE: 116 MMHG | OXYGEN SATURATION: 98 %

## 2022-05-13 DIAGNOSIS — E11.65 TYPE 2 DIABETES MELLITUS WITH HYPERGLYCEMIA, WITHOUT LONG-TERM CURRENT USE OF INSULIN (H): Primary | ICD-10-CM

## 2022-05-13 DIAGNOSIS — E11.9 TYPE 2 DIABETES MELLITUS WITHOUT COMPLICATION, WITHOUT LONG-TERM CURRENT USE OF INSULIN (H): ICD-10-CM

## 2022-05-13 DIAGNOSIS — R63.4 WEIGHT LOSS: ICD-10-CM

## 2022-05-13 DIAGNOSIS — H40.003 GLAUCOMA SUSPECT, BILATERAL: ICD-10-CM

## 2022-05-13 DIAGNOSIS — L84 CORN OR CALLUS: ICD-10-CM

## 2022-05-13 LAB
ANION GAP SERPL CALCULATED.3IONS-SCNC: 9 MMOL/L (ref 3–14)
BUN SERPL-MCNC: 26 MG/DL (ref 7–30)
CALCIUM SERPL-MCNC: 9.3 MG/DL (ref 8.5–10.1)
CHLORIDE BLD-SCNC: 107 MMOL/L (ref 94–109)
CHOLEST SERPL-MCNC: 156 MG/DL
CO2 SERPL-SCNC: 23 MMOL/L (ref 20–32)
CREAT SERPL-MCNC: 1.07 MG/DL (ref 0.66–1.25)
FASTING STATUS PATIENT QL REPORTED: YES
GFR SERPL CREATININE-BSD FRML MDRD: 69 ML/MIN/1.73M2
GLUCOSE BLD-MCNC: 119 MG/DL (ref 70–99)
HBA1C MFR BLD: 6.3 % (ref 0–5.6)
HDLC SERPL-MCNC: 36 MG/DL
LDLC SERPL CALC-MCNC: 107 MG/DL
NONHDLC SERPL-MCNC: 120 MG/DL
POTASSIUM BLD-SCNC: 4.2 MMOL/L (ref 3.4–5.3)
SODIUM SERPL-SCNC: 139 MMOL/L (ref 133–144)
TRIGL SERPL-MCNC: 64 MG/DL

## 2022-05-13 PROCEDURE — 99213 OFFICE O/P EST LOW 20 MIN: CPT | Performed by: FAMILY MEDICINE

## 2022-05-13 PROCEDURE — 99207 PR FOOT EXAM NO CHARGE: CPT | Performed by: FAMILY MEDICINE

## 2022-05-13 PROCEDURE — 80061 LIPID PANEL: CPT | Performed by: FAMILY MEDICINE

## 2022-05-13 PROCEDURE — 80048 BASIC METABOLIC PNL TOTAL CA: CPT | Performed by: FAMILY MEDICINE

## 2022-05-13 PROCEDURE — 83036 HEMOGLOBIN GLYCOSYLATED A1C: CPT | Performed by: FAMILY MEDICINE

## 2022-05-13 PROCEDURE — 36415 COLL VENOUS BLD VENIPUNCTURE: CPT | Performed by: FAMILY MEDICINE

## 2022-05-13 RX ORDER — LATANOPROST 50 UG/ML
1 SOLUTION/ DROPS OPHTHALMIC AT BEDTIME
Qty: 2.5 ML | Refills: 1 | Status: SHIPPED | OUTPATIENT
Start: 2022-05-13 | End: 2023-01-03

## 2022-05-13 ASSESSMENT — PAIN SCALES - GENERAL: PAINLEVEL: NO PAIN (0)

## 2022-05-13 NOTE — PROGRESS NOTES
Assessment & Plan   aMrcos is a 83 yo M with DM2 (Metformin 1000 mg daily), seasonal allergies, hpld, hx kidney stone, glaucoma here for diabetes mellitus.    Type 2 diabetes mellitus with hyperglycemia, without long-term current use of insulin (H)     A1c at goal; will continue current treatment. Given that checks blood sugars a few times a month; a elizabeth glucose monitoring system is not ideal.    - FOOT EXAM  - Basic metabolic panel  (Ca, Cl, CO2, Creat, Gluc, K, Na, BUN); Future  - Lipid Profile (Chol, Trig, HDL, LDL calc); Future  - Basic metabolic panel  (Ca, Cl, CO2, Creat, Gluc, K, Na, BUN)  - Lipid Profile (Chol, Trig, HDL, LDL calc)    Corn or callus: base Rt pinky toe    -  Use well fitting comfortable shoes    Weight Loss;    -  Says is intentional.     Return in about 5 months (around 10/13/2022) for Physical Exam.    Lyndon Patrick MD  [facility]      Murali Rodríguez is a 82 year old who presents for the following health issues   HPI   Diabetes Follow-up   Checks blood sugars a couple of times a month.   Has eggs and tosti regularly.   When rubs foot on floor been feeling numb, though not there today. Callus on lateral Rt foot.   Muscle tightness in the legs when walking; has to stop and stretch them.  How often are you checking your blood sugar? A few times a month  What time of day are you checking your blood sugars (select all that apply)?  Before meals  Have you had any blood sugars above 200?  Yes   Have you had any blood sugars below 70?  No    What symptoms do you notice when your blood sugar is low?  None    What concerns do you have today about your diabetes? Other: Numbness     Do you have any of these symptoms? (Select all that apply)  Numbness in feet and Burning in feet  BP Readings from Last 2 Encounters:   05/13/22 116/65   11/09/21 131/73     Hemoglobin A1C POCT (%)   Date Value   03/31/2021 6.7 (H)   10/20/2020 6.5 (H)     Hemoglobin A1C (%)   Date  "Value   11/09/2021 7.3 (H)     LDL Cholesterol Calculated (mg/dL)   Date Value   10/20/2020 115 (H)   06/16/2020 106 (H)     Weight Loss:    Wt Readings from Last 4 Encounters:   05/13/22 74.3 kg (163 lb 12.8 oz)   11/09/21 79.1 kg (174 lb 6.4 oz)   03/31/21 79.4 kg (175 lb)   10/20/20 78.5 kg (173 lb)     Hyperlipidemia Follow-Up    Are you regularly taking any medication or supplement to lower your cholesterol?   No    Are you having muscle aches or other side effects that you think could be caused by your cholesterol lowering medication?  No    Review of Systems   Constitutional, HEENT, cardiovascular, pulmonary, gi and gu systems are negative, except as otherwise noted.      Objective    /65 (BP Location: Right arm, Cuff Size: Adult Regular)   Pulse 76   Temp 97.7  F (36.5  C) (Oral)   Resp 16   Ht 1.82 m (5' 11.65\")   Wt 74.3 kg (163 lb 12.8 oz)   SpO2 98%   BMI 22.43 kg/m    Body mass index is 22.43 kg/m .  Physical Exam   GENERAL: healthy, alert and no distress  RESP: lungs clear to auscultation - no rales, rhonchi or wheezes  CV: regular rate and rhythm, no murmur, click or rub, no peripheral edema   ABDOMEN: soft, nontender, no masses and bowel sounds normal  MS: no gross musculoskeletal defects noted, no edema    "

## 2022-05-13 NOTE — LETTER
May 18, 2022      Marcos Downing  [address] [facility] [address]        Dear ,    We are writing to inform you of your test results.    kidney function is normal, cholesterol numbers borderline but at baseline we will recheck in a year         Resulted Orders   HEMOGLOBIN A1C   Result Value Ref Range    Hemoglobin A1C 6.3 (H) 0.0 - 5.6 %      Comment:      Normal <5.7%   Prediabetes 5.7-6.4%    Diabetes 6.5% or higher     Note: Adopted from ADA consensus guidelines.   Basic metabolic panel  (Ca, Cl, CO2, Creat, Gluc, K, Na, BUN)   Result Value Ref Range    Sodium 139 133 - 144 mmol/L    Potassium 4.2 3.4 - 5.3 mmol/L    Chloride 107 94 - 109 mmol/L    Carbon Dioxide (CO2) 23 20 - 32 mmol/L    Anion Gap 9 3 - 14 mmol/L    Urea Nitrogen 26 7 - 30 mg/dL    Creatinine 1.07 0.66 - 1.25 mg/dL    Calcium 9.3 8.5 - 10.1 mg/dL    Glucose 119 (H) 70 - 99 mg/dL    GFR Estimate 69 >60 mL/min/1.73m2      Comment:      Effective December 21, 2021 eGFRcr in adults is calculated using the 2021 CKD-EPI creatinine equation which includes age and gender (Demond et al., NE, DOI: 10.1056/SFIEnf5841844)   Lipid Profile (Chol, Trig, HDL, LDL calc)   Result Value Ref Range    Cholesterol 156 <200 mg/dL    Triglycerides 64 <150 mg/dL    Direct Measure HDL 36 (L) >=40 mg/dL    LDL Cholesterol Calculated 107 (H) <=100 mg/dL    Non HDL Cholesterol 120 <130 mg/dL    Patient Fasting > 8hrs? Yes     Narrative    Cholesterol  Desirable:  <200 mg/dL    Triglycerides  Normal:  Less than 150 mg/dL  Borderline High:  150-199 mg/dL  High:  200-499 mg/dL  Very High:  Greater than or equal to 500 mg/dL    Direct Measure HDL  Female:  Greater than or equal to 50 mg/dL   Male:  Greater than or equal to 40 mg/dL    LDL Cholesterol  Desirable:  <100mg/dL  Above Desirable:  100-129 mg/dL   Borderline High:  130-159 mg/dL   High:  160-189 mg/dL   Very High:  >= 190 mg/dL    Non HDL Cholesterol  Desirable:  130 mg/dL  Above  Desirable:  130-159 mg/dL  Borderline High:  160-189 mg/dL  High:  190-219 mg/dL  Very High:  Greater than or equal to 220 mg/dL       If you have any questions or concerns, please call the clinic at the number listed above.       Sincerely,      Lyndon Patrick MD/clark

## 2022-12-12 ENCOUNTER — OFFICE VISIT (OUTPATIENT)
Dept: FAMILY MEDICINE | Facility: CLINIC | Age: 82
End: 2022-12-12
Payer: MEDICARE

## 2022-12-12 VITALS
RESPIRATION RATE: 16 BRPM | DIASTOLIC BLOOD PRESSURE: 63 MMHG | BODY MASS INDEX: 21.56 KG/M2 | WEIGHT: 159.2 LBS | HEART RATE: 70 BPM | TEMPERATURE: 97.5 F | SYSTOLIC BLOOD PRESSURE: 116 MMHG | OXYGEN SATURATION: 98 % | HEIGHT: 72 IN

## 2022-12-12 DIAGNOSIS — Z00.00 ENCOUNTER FOR SUBSEQUENT ANNUAL WELLNESS VISIT (AWV) IN MEDICARE PATIENT: Primary | ICD-10-CM

## 2022-12-12 DIAGNOSIS — R63.4 WEIGHT LOSS: ICD-10-CM

## 2022-12-12 DIAGNOSIS — E11.9 TYPE 2 DIABETES MELLITUS WITHOUT COMPLICATION, WITHOUT LONG-TERM CURRENT USE OF INSULIN (H): ICD-10-CM

## 2022-12-12 LAB
ALBUMIN SERPL-MCNC: 3.7 G/DL (ref 3.4–5)
ALP SERPL-CCNC: 80 U/L (ref 40–150)
ALT SERPL W P-5'-P-CCNC: 22 U/L (ref 0–70)
AST SERPL W P-5'-P-CCNC: 14 U/L (ref 0–45)
BILIRUB DIRECT SERPL-MCNC: 0.1 MG/DL (ref 0–0.2)
BILIRUB SERPL-MCNC: 0.6 MG/DL (ref 0.2–1.3)
CREAT UR-MCNC: 163 MG/DL
HBA1C MFR BLD: 6.1 % (ref 0–5.6)
LIPASE SERPL-CCNC: 97 U/L (ref 73–393)
MICROALBUMIN UR-MCNC: 11 MG/L
MICROALBUMIN/CREAT UR: 6.75 MG/G CR (ref 0–17)
PROT SERPL-MCNC: 7 G/DL (ref 6.8–8.8)

## 2022-12-12 PROCEDURE — G0008 ADMIN INFLUENZA VIRUS VAC: HCPCS | Performed by: FAMILY MEDICINE

## 2022-12-12 PROCEDURE — 80076 HEPATIC FUNCTION PANEL: CPT | Performed by: FAMILY MEDICINE

## 2022-12-12 PROCEDURE — 90472 IMMUNIZATION ADMIN EACH ADD: CPT | Performed by: FAMILY MEDICINE

## 2022-12-12 PROCEDURE — 91312 COVID-19 VACCINE BIVALENT BOOSTER 12+ (PFIZER): CPT | Performed by: FAMILY MEDICINE

## 2022-12-12 PROCEDURE — 83690 ASSAY OF LIPASE: CPT | Performed by: FAMILY MEDICINE

## 2022-12-12 PROCEDURE — 90662 IIV NO PRSV INCREASED AG IM: CPT | Performed by: FAMILY MEDICINE

## 2022-12-12 PROCEDURE — 0124A COVID-19 VACCINE BIVALENT BOOSTER 12+ (PFIZER): CPT | Performed by: FAMILY MEDICINE

## 2022-12-12 PROCEDURE — 82043 UR ALBUMIN QUANTITATIVE: CPT | Performed by: FAMILY MEDICINE

## 2022-12-12 PROCEDURE — 90715 TDAP VACCINE 7 YRS/> IM: CPT | Performed by: FAMILY MEDICINE

## 2022-12-12 PROCEDURE — 83036 HEMOGLOBIN GLYCOSYLATED A1C: CPT | Performed by: FAMILY MEDICINE

## 2022-12-12 PROCEDURE — G0439 PPPS, SUBSEQ VISIT: HCPCS | Performed by: FAMILY MEDICINE

## 2022-12-12 PROCEDURE — 36415 COLL VENOUS BLD VENIPUNCTURE: CPT | Performed by: FAMILY MEDICINE

## 2022-12-12 ASSESSMENT — ENCOUNTER SYMPTOMS
NERVOUS/ANXIOUS: 0
FREQUENCY: 1
DIARRHEA: 0
JOINT SWELLING: 0
CONSTIPATION: 0
PALPITATIONS: 0
WEAKNESS: 0
DIZZINESS: 0
ARTHRALGIAS: 0
HEMATURIA: 0
HEADACHES: 0
COUGH: 0
PARESTHESIAS: 0
FEVER: 0
DYSURIA: 0
HEMATOCHEZIA: 0
SHORTNESS OF BREATH: 0
SORE THROAT: 0
HEARTBURN: 0
EYE PAIN: 0
NAUSEA: 0
MYALGIAS: 0
CHILLS: 0
ABDOMINAL PAIN: 0

## 2022-12-12 ASSESSMENT — ACTIVITIES OF DAILY LIVING (ADL): CURRENT_FUNCTION: NO ASSISTANCE NEEDED

## 2022-12-12 ASSESSMENT — PAIN SCALES - GENERAL: PAINLEVEL: NO PAIN (0)

## 2022-12-12 NOTE — LETTER
December 15, 2022    Marcos Downing  [address] [facility] [address]          Dear ,    We are writing to inform you of your test results.    Your results are normal.    Resulted Orders   Hemoglobin A1c   Result Value Ref Range    Hemoglobin A1C 6.1 (H) 0.0 - 5.6 %      Comment:      Normal <5.7%   Prediabetes 5.7-6.4%    Diabetes 6.5% or higher     Note: Adopted from ADA consensus guidelines.   Albumin Random Urine Quantitative with Creat Ratio   Result Value Ref Range    Creatinine Urine mg/dL 163 mg/dL    Albumin Urine mg/L 11 mg/L    Albumin Urine mg/g Cr 6.75 0.00 - 17.00 mg/g Cr   Lipase   Result Value Ref Range    Lipase 97 73 - 393 U/L   Hepatic panel (Albumin, ALT, AST, Bili, Alk Phos, TP)   Result Value Ref Range    Bilirubin Total 0.6 0.2 - 1.3 mg/dL    Bilirubin Direct 0.1 0.0 - 0.2 mg/dL    Protein Total 7.0 6.8 - 8.8 g/dL    Albumin 3.7 3.4 - 5.0 g/dL    Alkaline Phosphatase 80 40 - 150 U/L    AST 14 0 - 45 U/L    ALT 22 0 - 70 U/L       If you have any questions or concerns, please call the clinic at the number listed above.       Sincerely,      Lyndon Patrick MD

## 2022-12-12 NOTE — PROGRESS NOTES
"SUBJECTIVE:   Marcos is a 82 year old who presents for Preventive Visit.    Are you in the first 12 months of your Medicare coverage?  No  Healthy Habits:     In general, how would you rate your overall health?  Excellent    Frequency of exercise:  2-3 days/week    Duration of exercise:  45-60 minutes    Do you usually eat at least 4 servings of fruit and vegetables a day, include whole grains    & fiber and avoid regularly eating high fat or \"junk\" foods?  No    Taking medications regularly:  Yes    Medication side effects:  None    Ability to successfully perform activities of daily living:  No assistance needed    Home Safety:  No safety concerns identified    Hearing Impairment:  No hearing concerns    In the past 6 months, have you been bothered by leaking of urine? Yes    In general, how would you rate your overall mental or emotional health?  Excellent      PHQ-2 Total Score: 0    Additional concerns today:  No  Marcos is a 83 yo M with DM2 (Metformin 1000 mg daily), seasonal allergies, hpld, hx kidney stone, glaucoma,     12/12: Wellness    care gaps: shingrix#2 of 3, tdap, covod#3, flu, a1c, alb, phq2, fall, foot, ldl (last high)    FIT 2018: was normal  DEXA: ?  Wt Readings from Last 4 Encounters:   12/12/22 72.2 kg (159 lb 3.2 oz)   05/13/22 74.3 kg (163 lb 12.8 oz)   11/09/21 79.1 kg (174 lb 6.4 oz)   03/31/21 79.4 kg (175 lb)       Shots:   Flu shot: makes him sick   Covid booster: will get today   TDAP:     Have you ever done Advance Care Planning? (For example, a Health Directive, POLST, or a discussion with a medical provider or your loved ones about your wishes): No, advance care planning information given to patient to review.  Patient plans to discuss their wishes with loved ones or provider.       Fall risk  Fallen 2 or more times in the past year?: No  Any fall with injury in the past year?: No    Cognitive Screening   1) Repeat 3 items (Leader, Season, Table)    2) Clock draw: NORMAL  3) 3 item " recall: Recalls 2 objects   Results: NORMAL clock, 1-2 items recalled: COGNITIVE IMPAIRMENT LESS LIKELY    Mini-CogTM Copyright AVA Downing. Licensed by the author for use in [facility]; reprinted with permission (shana@[facility]). All rights reserved.      Do you have sleep apnea, excessive snoring or daytime drowsiness?: no    Reviewed and updated as needed this visit by clinical staff   Tobacco  Allergies  Meds              Reviewed and updated as needed this visit by Provider                 Social History     Tobacco Use     Smoking status: Former     Types: Cigarettes     Quit date: 1971     Years since quittin.7     Smokeless tobacco: Never   Substance Use Topics     Alcohol use: Yes     Comment: special occasions only     If you drink alcohol do you typically have >3 drinks per day or >7 drinks per week? No    Alcohol Use 2022   Prescreen: >3 drinks/day or >7 drinks/week? No   Prescreen: >3 drinks/day or >7 drinks/week? -   No flowsheet data found.    PROBLEMS TO ADD ON...    Current providers sharing in care for this patient include:   Patient Care Team:  Lyndon Patrick MD as PCP - General (Family Practice)  Lyndon Patrick MD as Assigned PCP  Regis Castillo MD as Assigned Surgical Provider    The following health maintenance items are reviewed in Epic and correct as of today:  Health Maintenance   Topic Date Due     ZOSTER IMMUNIZATION (2 of 3) 2014     MEDICARE ANNUAL WELLNESS VISIT  10/20/2021     EYE EXAM  2022     ANNUAL REVIEW OF HM ORDERS  2022     BMP  2023     LIPID  2023     DIABETIC FOOT EXAM  2023     A1C  2023     MICROALBUMIN  2023     FALL RISK ASSESSMENT  2023     ADVANCE CARE PLANNING  10/21/2025     DTAP/TDAP/TD IMMUNIZATION (3 - Td or Tdap) 2032     PHQ-2 (once per calendar year)  Completed     INFLUENZA VACCINE  Completed     Pneumococcal Vaccine: 65+ Years  Completed     COVID-19  Vaccine  Completed     IPV IMMUNIZATION  Aged Out     MENINGITIS IMMUNIZATION  Aged Out     Patient Active Problem List   Diagnosis     Hyperlipidemia with target LDL less than 100     Type 2 diabetes mellitus with hyperglycemia, without long-term current use of insulin (H)     Type 2 diabetes mellitus without retinopathy (H)     Glaucoma suspect, bilateral - treated     Posterior vitreous detachment, left     Combined form of age-related cataract, mild, both eyes     Past Surgical History:   Procedure Laterality Date     CYSTOSCOPY, DILATE URETHRA, COMBINED          TONSILLECTOMY      T & A  age 3     VASECTOMY  Age 39       Social History     Tobacco Use     Smoking status: Former     Types: Cigarettes     Quit date: 1971     Years since quittin.7     Smokeless tobacco: Never   Substance Use Topics     Alcohol use: Yes     Comment: special occasions only     Family History   Problem Relation Age of Onset     Diabetes Mother      Heart Disease Mother      Diabetes Father      Diabetes Brother      Cancer Brother      Cancer Sister      Diabetes Sister      Diabetes Brother      Glaucoma No family hx of      Macular Degeneration No family hx of          Review of Systems   Constitutional: Negative for chills and fever.   HENT: Negative for congestion, ear pain, hearing loss and sore throat.    Eyes: Negative for pain and visual disturbance.   Respiratory: Negative for cough and shortness of breath.    Cardiovascular: Negative for chest pain, palpitations and peripheral edema.   Gastrointestinal: Negative for abdominal pain, constipation, diarrhea, heartburn, hematochezia and nausea.   Genitourinary: Positive for frequency and urgency. Negative for dysuria, genital sores, hematuria, impotence and penile discharge.   Musculoskeletal: Negative for arthralgias, joint swelling and myalgias.   Skin: Negative for rash.   Neurological: Negative for dizziness, weakness, headaches and paresthesias.  "  Psychiatric/Behavioral: Negative for mood changes. The patient is not nervous/anxious.      OBJECTIVE:   /63 (BP Location: Right arm, Cuff Size: Adult Regular)   Pulse 70   Temp 97.5  F (36.4  C) (Oral)   Resp 16   Ht 1.825 m (5' 11.85\")   Wt 72.2 kg (159 lb 3.2 oz)   SpO2 98%   BMI 21.68 kg/m   Estimated body mass index is 21.68 kg/m  as calculated from the following:    Height as of this encounter: 1.825 m (5' 11.85\").    Weight as of this encounter: 72.2 kg (159 lb 3.2 oz).  Physical Exam  GENERAL: healthy, alert and no distress  EYES: Eyes grossly normal to inspection, PERRL and conjunctivae and sclerae normal  HENT: ear canals and TM's normal, nose and mouth without ulcers or lesions  NECK: no adenopathy and thyroid normal to palpation  RESP: lungs clear to auscultation - no rales, rhonchi or wheezes  CV: regular rate and rhythm, no murmur, click or rub, no peripheral edema  ABDOMEN: soft, nontender,  no masses and bowel sounds normal  MS: no gross musculoskeletal defects noted, no edema  SKIN: no suspicious lesions or rashes  NEURO: Normal strength and tone, mentation intact and speech normal  PSYCH: mentation appears normal, affect normal/bright    Diagnostic Test Results:  Labs reviewed in Epic    ASSESSMENT / PLAN:   Marcos was seen today for physical.    Diagnoses and all orders for this visit:    Encounter for subsequent annual wellness visit (AWV) in Medicare patient  -     TDAP VACCINE (Adacel, Boostrix)  [[id number]]  -     COVID-19 VACCINE BIVALENT BOOSTER 12+ (PFIZER)  -     INFLUENZA VACCINE 65+ (FLUZONE HD)    Type 2 diabetes mellitus without complication, without long-term current use of insulin (H)  -     Hemoglobin A1c; Future  -     Albumin Random Urine Quantitative with Creat Ratio; Future  -     Lipase; Future  -     Hepatic panel (Albumin, ALT, AST, Bili, Alk Phos, TP); Future  -     Hemoglobin A1c  -     Albumin Random Urine Quantitative with Creat Ratio  -     Lipase  -     " Hepatic panel (Albumin, ALT, AST, Bili, Alk Phos, TP)    Weight loss  -     Lipase; Future  -     Lipase      Patient has been advised of split billing requirements and indicates understanding: Yes    COUNSELING:  Reviewed preventive health counseling, as reflected in patient instructions       Regular exercise       Healthy diet/nutrition       Bladder control       Fall risk prevention       Immunizations    Vaccinated for: Covid-19, Influenza and TDAP      He reports that he quit smoking about 51 years ago. He has never used smokeless tobacco.      Appropriate preventive services were discussed with this patient, including applicable screening as appropriate for cardiovascular disease, diabetes, osteopenia/osteoporosis, and glaucoma.  As appropriate for age/gender, discussed screening for colorectal cancer, prostate cancer, breast cancer, and cervical cancer. Checklist reviewing preventive services available has been given to the patient.    Reviewed patients plan of care and provided an AVS. The Basic Care Plan (routine screening as documented in Health Maintenance) for Marcos meets the Care Plan requirement. This Care Plan has been established and reviewed with the Patient.          Lyndon Patrick MD  [facility]    Identified Health Risks:

## 2023-01-03 ENCOUNTER — OFFICE VISIT (OUTPATIENT)
Dept: OPHTHALMOLOGY | Facility: CLINIC | Age: 83
End: 2023-01-03
Payer: MEDICARE

## 2023-01-03 DIAGNOSIS — Z01.01 ENCOUNTER FOR EXAMINATION OF EYES AND VISION WITH ABNORMAL FINDINGS: ICD-10-CM

## 2023-01-03 DIAGNOSIS — H43.812 POSTERIOR VITREOUS DETACHMENT, LEFT: ICD-10-CM

## 2023-01-03 DIAGNOSIS — H40.003 GLAUCOMA SUSPECT, BILATERAL: ICD-10-CM

## 2023-01-03 DIAGNOSIS — H25.813 COMBINED FORM OF AGE-RELATED CATARACT, BOTH EYES: ICD-10-CM

## 2023-01-03 DIAGNOSIS — E11.9 TYPE 2 DIABETES MELLITUS WITHOUT RETINOPATHY (H): Primary | ICD-10-CM

## 2023-01-03 DIAGNOSIS — H52.4 PRESBYOPIA: ICD-10-CM

## 2023-01-03 PROCEDURE — 92014 COMPRE OPH EXAM EST PT 1/>: CPT | Performed by: OPHTHALMOLOGY

## 2023-01-03 PROCEDURE — 92015 DETERMINE REFRACTIVE STATE: CPT | Mod: GY | Performed by: OPHTHALMOLOGY

## 2023-01-03 RX ORDER — LATANOPROST 50 UG/ML
1 SOLUTION/ DROPS OPHTHALMIC AT BEDTIME
Qty: 7.5 ML | Refills: 4 | Status: SHIPPED | OUTPATIENT
Start: 2023-01-03 | End: 2023-01-27

## 2023-01-03 RX ORDER — BRIMONIDINE TARTRATE 2 MG/ML
1 SOLUTION/ DROPS OPHTHALMIC 2 TIMES DAILY
Qty: 10 ML | Refills: 4 | Status: SHIPPED | OUTPATIENT
Start: 2023-01-03 | End: 2023-12-17

## 2023-01-03 ASSESSMENT — REFRACTION_WEARINGRX
OD_CYLINDER: +1.00
OD_AXIS: 162
SPECS_TYPE: BIFOCAL
OS_CYLINDER: +1.00
OD_ADD: +3.00
OS_SPHERE: +1.50
OS_AXIS: 001
OD_CYLINDER: +1.00
OS_SPHERE: +4.25
OD_AXIS: 158
OS_ADD: +3.00
OD_SPHERE: +4.00
OS_CYLINDER: +1.00
OD_SPHERE: +1.25
SPECS_TYPE: SVL;RDNG
OS_AXIS: 004

## 2023-01-03 ASSESSMENT — VISUAL ACUITY
OD_CC+: -3
OD_CC: J1
OS_CC: J1-
OS_CC: 20/50
OS_CC+: -1
METHOD: SNELLEN - LINEAR
CORRECTION_TYPE: GLASSES
OD_CC: 20/25
OS_PH_CC: 20/40

## 2023-01-03 ASSESSMENT — REFRACTION_MANIFEST
OD_CYLINDER: +1.25
OS_SPHERE: +1.25
OS_CYLINDER: +1.50
OD_AXIS: 162
OD_SPHERE: +0.75
OD_ADD: +3.00
OS_AXIS: 006
OS_ADD: +3.00

## 2023-01-03 ASSESSMENT — CONF VISUAL FIELD
OS_INFERIOR_NASAL_RESTRICTION: 0
OS_SUPERIOR_TEMPORAL_RESTRICTION: 0
OS_SUPERIOR_NASAL_RESTRICTION: 0
OD_NORMAL: 1
OD_SUPERIOR_TEMPORAL_RESTRICTION: 0
OD_INFERIOR_NASAL_RESTRICTION: 0
OS_INFERIOR_TEMPORAL_RESTRICTION: 0
OD_SUPERIOR_NASAL_RESTRICTION: 0
OS_NORMAL: 1
OD_INFERIOR_TEMPORAL_RESTRICTION: 0

## 2023-01-03 ASSESSMENT — EXTERNAL EXAM - RIGHT EYE: OD_EXAM: 1+ BROW PTOSIS, MILD TO MOD BROW

## 2023-01-03 ASSESSMENT — EXTERNAL EXAM - LEFT EYE: OS_EXAM: 1+ BROW PTOSIS, MILD TO MOD BROW

## 2023-01-03 ASSESSMENT — PACHYMETRY
OS_CT(UM): 578
OD_CT(UM): 598

## 2023-01-03 ASSESSMENT — CUP TO DISC RATIO
OD_RATIO: 0.6
OS_RATIO: 0.7

## 2023-01-03 ASSESSMENT — TONOMETRY
OS_IOP_MMHG: 21
OD_IOP_MMHG: 15
IOP_METHOD: APPLANATION

## 2023-01-03 NOTE — LETTER
1/3/2023         RE: Marcos Downing  [address] [facility] [address]        Dear Colleague,    Thank you for referring your patient, Marcos Downing, to the [facility]. Please see a copy of my visit note below.     Current Eye Medications:  Latanoprost, but hasn't used it for the past 3 months.       Subjective:  Patient is here for a Diabetic Eye Exam.   No vision changes or concerns.  He forgot to  Latanoprost at the pharmacy, so he hasn't used it for the past 3 months.      Lab Results   Component Value Date    A1C 6.1 12/12/2022    A1C 6.3 05/13/2022    A1C 7.3 11/09/2021    A1C 6.7 03/31/2021    A1C 6.5 10/20/2020    A1C 7.6 06/16/2020    A1C 8.7 01/09/2020    A1C 7.3 09/09/2019        Objective:  See Ophthalmology Exam.       Assessment:  Poor adherence to intraocular pressure lowering treatment; intraocular pressure too high left eye with possible disc worsening.  Cataracts probably visually significant but patient happy with current status.  No diabetic retinopathy.      Plan:  Resume Latanoprost (green top) every evening both eyes.  Start Brimonidine drop twice daily left eye. About 12 hours apart.   Wait at least 5 minutes between drops if using more than one at a time.      ICD-10-CM    1. Type 2 diabetes mellitus without retinopathy (H)  E11.9       2. Combined form of age-related cataract, mild-mod, both eyes  H25.813       3. Glaucoma suspect, bilateral - treated  H40.003 latanoprost (XALATAN) 0.005 % ophthalmic solution     brimonidine (ALPHAGAN) 0.2 % ophthalmic solution      4. Posterior vitreous detachment, left  H43.812       5. Encounter for examination of eyes and vision with abnormal findings  Z01.01 EYE EXAM (SIMPLE-NONBILLABLE)      6. Presbyopia  H52.4 REFRACTION        Glasses prescription given - optional  May use artificial tears up to four times a day (like Refresh Optive, Systane Balance, TheraTears, or generic artificial tears are  "ok. Avoid \"get the red out\" drops).   Return visit next available at convenience for an intraocular pressure check, glaucoma OCT, retinal OCT, and [place] Visual Field.   Regis Castillo M.D.  [phone]           Again, thank you for allowing me to participate in the care of your patient.        Sincerely,        Regis Castillo MD    "

## 2023-01-03 NOTE — PROGRESS NOTES
" Current Eye Medications:  Latanoprost, but hasn't used it for the past 3 months.       Subjective:  Patient is here for a Diabetic Eye Exam.   No vision changes or concerns.  He forgot to  Latanoprost at the pharmacy, so he hasn't used it for the past 3 months.      Lab Results   Component Value Date    A1C 6.1 12/12/2022    A1C 6.3 05/13/2022    A1C 7.3 11/09/2021    A1C 6.7 03/31/2021    A1C 6.5 10/20/2020    A1C 7.6 06/16/2020    A1C 8.7 01/09/2020    A1C 7.3 09/09/2019        Objective:  See Ophthalmology Exam.       Assessment:  Poor adherence to intraocular pressure lowering treatment; intraocular pressure too high left eye with possible disc worsening.  Cataracts probably visually significant but patient happy with current status.  No diabetic retinopathy.      Plan:  Resume Latanoprost (green top) every evening both eyes.  Start Brimonidine drop twice daily left eye. About 12 hours apart.   Wait at least 5 minutes between drops if using more than one at a time.      ICD-10-CM    1. Type 2 diabetes mellitus without retinopathy (H)  E11.9       2. Combined form of age-related cataract, mild-mod, both eyes  H25.813       3. Glaucoma suspect, bilateral - treated  H40.003 latanoprost (XALATAN) 0.005 % ophthalmic solution     brimonidine (ALPHAGAN) 0.2 % ophthalmic solution      4. Posterior vitreous detachment, left  H43.812       5. Encounter for examination of eyes and vision with abnormal findings  Z01.01 EYE EXAM (SIMPLE-NONBILLABLE)      6. Presbyopia  H52.4 REFRACTION        Glasses prescription given - optional  May use artificial tears up to four times a day (like Refresh Optive, Systane Balance, TheraTears, or generic artificial tears are ok. Avoid \"get the red out\" drops).   Return visit next available at convenience for an intraocular pressure check, glaucoma OCT, retinal OCT, and Blankenship Visual Field.   Regis Castillo M.D.  [phone]       "

## 2023-01-03 NOTE — PATIENT INSTRUCTIONS
"Resume Latanoprost (green top) every evening both eyes.  Start Brimonidine drop twice daily left eye. About 12 hours apart.   Wait at least 5 minutes between drops if using more than one at a time.    Glasses prescription given - optional  May use artificial tears up to four times a day (like Refresh Optive, Systane Balance, TheraTears, or generic artificial tears are ok. Avoid \"get the red out\" drops).   Return visit next available at convenience for an intraocular pressure check, glaucoma OCT, retinal OCT, and Blankenship Visual Field.   Regis Castillo M.D.  [phone]      Patient Education   Diabetes weakens the blood vessels all over the body, including the eyes. Damage to the blood vessels in the eyes can cause swelling or bleeding into part of the eye (called the retina). This is called diabetic retinopathy (SHALONDA-tin--[facility]-thee). If not treated, this disease can cause vision loss or blindness.   Symptoms may include blurred or distorted vision, but many people have no symptoms. It's important to see your eye doctor regularly to check for problems.   Early treatment and good control can help protect your vision. Here are the things you can do to help prevent vision loss:      1. Keep your blood sugar levels under tight control.      2. Bring high blood pressure under control.      3. No smoking.      4. Have yearly dilated eye exams.       "

## 2023-01-24 ENCOUNTER — OFFICE VISIT (OUTPATIENT)
Dept: OPHTHALMOLOGY | Facility: CLINIC | Age: 83
End: 2023-01-24
Payer: MEDICARE

## 2023-01-24 DIAGNOSIS — H40.003 GLAUCOMA SUSPECT, BILATERAL: Primary | ICD-10-CM

## 2023-01-24 PROCEDURE — 92083 EXTENDED VISUAL FIELD XM: CPT | Performed by: OPHTHALMOLOGY

## 2023-01-24 PROCEDURE — 92012 INTRM OPH EXAM EST PATIENT: CPT | Performed by: OPHTHALMOLOGY

## 2023-01-24 PROCEDURE — 92133 CPTRZD OPH DX IMG PST SGM ON: CPT | Performed by: OPHTHALMOLOGY

## 2023-01-24 ASSESSMENT — EXTERNAL EXAM - RIGHT EYE: OD_EXAM: 1+ BROW PTOSIS, MILD TO MOD BROW

## 2023-01-24 ASSESSMENT — VISUAL ACUITY
METHOD: SNELLEN - LINEAR
OS_CC+: -2
OD_CC: 20/30
CORRECTION_TYPE: GLASSES
OS_CC: 20/40
OD_CC+: +2

## 2023-01-24 ASSESSMENT — TONOMETRY
OD_IOP_MMHG: 12
IOP_METHOD: APPLANATION
OS_IOP_MMHG: 13

## 2023-01-24 ASSESSMENT — EXTERNAL EXAM - LEFT EYE: OS_EXAM: 1+ BROW PTOSIS, MILD TO MOD BROW

## 2023-01-24 NOTE — LETTER
1/24/2023         RE: Marcos Downing  [address] [facility] [address]        Dear Colleague,    Thank you for referring your patient, Marcos Downing, to the [facility]. Please see a copy of my visit note below.     Current Eye Medications:  latanoporst - both eyes at bedtime - last dose: 12am this am  Brimonidine - both eyes BID - last dose: 10am today     Subjective:  Here for HVF, OCT and IOP check - tries to be consistent with daily use of drops - admits that he has missed a couple of times. No irritation or discomfort with drops..     Told he has large sinus cavity. Sees blood when blowing his nose in the morning.  Also has a cyst under left eye, near nose that he can squeeze and express white matter. Also has teeth issues on left side of mouth. Wondering if these issues could be effecting his eye pressure.      Objective:  See Ophthalmology Exam.       Assessment:  Intraocular pressure improved left eye on current regimen.  Mostly stable glaucoma OCT and Blankenship Visual Field both eyes.      Plan:  Continue:  Latanoprost (green top) every evening both eyes.  Brimonidine drop twice daily left eye. About 12 hours apart.   Wait at least 5 minutes between drops if using more than one at a time.    Return visit 6 months for an intraocular pressure check.  Regis Castillo M.D.  [phone]            Again, thank you for allowing me to participate in the care of your patient.        Sincerely,        Regis Castillo MD

## 2023-01-24 NOTE — PATIENT INSTRUCTIONS
Continue:  Latanoprost (green top) every evening both eyes.  Brimonidine drop twice daily left eye. About 12 hours apart.   Wait at least 5 minutes between drops if using more than one at a time.    Return visit 6 months for an intraocular pressure check.  Regis Castillo M.D.  [phone]

## 2023-01-24 NOTE — PROGRESS NOTES
Current Eye Medications:  latanoporst - both eyes at bedtime - last dose: 12am this am  Brimonidine - both eyes BID - last dose: 10am today     Subjective:  Here for HVF, OCT and IOP check - tries to be consistent with daily use of drops - admits that he has missed a couple of times. No irritation or discomfort with drops..     Told he has large sinus cavity. Sees blood when blowing his nose in the morning.  Also has a cyst under left eye, near nose that he can squeeze and express white matter. Also has teeth issues on left side of mouth. Wondering if these issues could be effecting his eye pressure.      Objective:  See Ophthalmology Exam.       Assessment:  Intraocular pressure improved left eye on current regimen.  Mostly stable glaucoma OCT and Blankenship Visual Field both eyes.      Plan:  Continue:  Latanoprost (green top) every evening both eyes.  Brimonidine drop twice daily left eye. About 12 hours apart.   Wait at least 5 minutes between drops if using more than one at a time.    Return visit 6 months for an intraocular pressure check.  Regis Castillo M.D.  [phone]

## 2023-01-27 DIAGNOSIS — H40.003 GLAUCOMA SUSPECT, BILATERAL: ICD-10-CM

## 2023-01-27 RX ORDER — LATANOPROST 50 UG/ML
1 SOLUTION/ DROPS OPHTHALMIC AT BEDTIME
Qty: 7.5 ML | Refills: 4 | Status: SHIPPED | OUTPATIENT
Start: 2023-01-27 | End: 2023-12-18 | Stop reason: SINTOL

## 2023-01-27 NOTE — TELEPHONE ENCOUNTER
Refill request for Latanoprost from [facility]. Last visit 1/24/23 with next appt for 7/24/23. Last notes indicate to continue Latanoprost.

## 2023-02-05 ASSESSMENT — PACHYMETRY
OS_CT(UM): 578
OD_CT(UM): 598

## 2023-05-30 DIAGNOSIS — E11.65 TYPE 2 DIABETES MELLITUS WITH HYPERGLYCEMIA, WITHOUT LONG-TERM CURRENT USE OF INSULIN (H): ICD-10-CM

## 2023-05-30 RX ORDER — METFORMIN HCL 500 MG
TABLET, EXTENDED RELEASE 24 HR ORAL
Qty: 180 TABLET | Refills: 1 | Status: SHIPPED | OUTPATIENT
Start: 2023-05-30 | End: 2024-02-08

## 2023-07-24 ENCOUNTER — OFFICE VISIT (OUTPATIENT)
Dept: OPHTHALMOLOGY | Facility: CLINIC | Age: 83
End: 2023-07-24
Payer: MEDICARE

## 2023-07-24 DIAGNOSIS — H40.003 GLAUCOMA SUSPECT, BILATERAL: Primary | ICD-10-CM

## 2023-07-24 PROCEDURE — 92012 INTRM OPH EXAM EST PATIENT: CPT | Performed by: OPHTHALMOLOGY

## 2023-07-24 ASSESSMENT — REFRACTION_WEARINGRX
OD_SPHERE: +1.25
SPECS_TYPE: SVL;RDNG
OD_AXIS: 158
OS_CYLINDER: +1.00
OD_ADD: +3.00
OD_SPHERE: +4.00
OS_AXIS: 004
OS_CYLINDER: +1.00
OD_CYLINDER: +1.00
OS_SPHERE: +4.25
OS_ADD: +3.00
OS_SPHERE: +1.50
SPECS_TYPE: BIFOCAL
OD_CYLINDER: +1.00
OS_AXIS: 001
OD_AXIS: 162

## 2023-07-24 ASSESSMENT — VISUAL ACUITY
OS_CC+: -1
CORRECTION_TYPE: GLASSES
OS_CC: 20/40 SLOW
OD_CC+: -1
METHOD: SNELLEN - LINEAR
OD_CC: 20/25

## 2023-07-24 ASSESSMENT — TONOMETRY
OS_IOP_MMHG: 13
OD_IOP_MMHG: 13
IOP_METHOD: APPLANATION

## 2023-07-24 ASSESSMENT — EXTERNAL EXAM - LEFT EYE: OS_EXAM: 1+ BROW PTOSIS, MILD TO MOD BROW

## 2023-07-24 ASSESSMENT — EXTERNAL EXAM - RIGHT EYE: OD_EXAM: 1+ BROW PTOSIS, MILD TO MOD BROW

## 2023-07-24 NOTE — PATIENT INSTRUCTIONS
Continue:   Latanoprost (green top) every evening both eyes.  Brimonidine (purple top) twice daily left eye. About 12 hours apart.   Wait at least 5 minutes between drops if using more than one at a time.     Return visit in 6 months for a complete exam.   Regis Castillo M.D.  [phone]

## 2023-07-24 NOTE — LETTER
7/24/2023         RE: Marcos Downing  [address] [place] [address]        Dear Colleague,    Thank you for referring your patient, Marcos Downing, to the [facility]. Please see a copy of my visit note below.     Current Eye Medications:  Latanoprost (green top) every evening both eyes last took at midnight.  Brimonidine drop twice daily left eye, last took at 8 am.     Subjective:  6 month follow up for IOP check. Vision is OK both eyes, unchanged since last visit. No eye pain in either eye. Little itching on lids, has to clean eyelashes and that takes care of it.     Patient states compliant with drops.  Mostly satisfied with current visual status.     Objective:  See Ophthalmology Exam.       Assessment:  Intraocular pressure continues well controlled with current regimen.      Plan:  Continue:   Latanoprost (green top) every evening both eyes.  Brimonidine (purple top) twice daily left eye. About 12 hours apart.   Wait at least 5 minutes between drops if using more than one at a time.     Return visit in 6 months for a complete exam.   Regis Castillo M.D.  [phone]         Again, thank you for allowing me to participate in the care of your patient.        Sincerely,        Regis Castillo MD

## 2023-07-24 NOTE — PROGRESS NOTES
Current Eye Medications:  Latanoprost (green top) every evening both eyes last took at midnight.  Brimonidine drop twice daily left eye, last took at 8 am.     Subjective:  6 month follow up for IOP check. Vision is OK both eyes, unchanged since last visit. No eye pain in either eye. Little itching on lids, has to clean eyelashes and that takes care of it.     Patient states compliant with drops.  Mostly satisfied with current visual status.     Objective:  See Ophthalmology Exam.       Assessment:  Intraocular pressure continues well controlled with current regimen.      Plan:  Continue:   Latanoprost (green top) every evening both eyes.  Brimonidine (purple top) twice daily left eye. About 12 hours apart.   Wait at least 5 minutes between drops if using more than one at a time.     Return visit in 6 months for a complete exam.   Regis Castillo M.D.  [phone]

## 2023-10-11 ENCOUNTER — OFFICE VISIT (OUTPATIENT)
Dept: OPHTHALMOLOGY | Facility: CLINIC | Age: 83
End: 2023-10-11
Payer: MEDICARE

## 2023-10-11 DIAGNOSIS — H43.812 POSTERIOR VITREOUS DETACHMENT, LEFT: ICD-10-CM

## 2023-10-11 DIAGNOSIS — H10.33 ACUTE CONJUNCTIVITIS OF BOTH EYES, UNSPECIFIED ACUTE CONJUNCTIVITIS TYPE: Primary | ICD-10-CM

## 2023-10-11 DIAGNOSIS — H40.003 GLAUCOMA SUSPECT, BILATERAL: ICD-10-CM

## 2023-10-11 PROCEDURE — 92012 INTRM OPH EXAM EST PATIENT: CPT | Performed by: STUDENT IN AN ORGANIZED HEALTH CARE EDUCATION/TRAINING PROGRAM

## 2023-10-11 RX ORDER — TOBRAMYCIN AND DEXAMETHASONE 3; 1 MG/ML; MG/ML
1 SUSPENSION/ DROPS OPHTHALMIC 3 TIMES DAILY
Qty: 5 ML | Refills: 0 | Status: SHIPPED | OUTPATIENT
Start: 2023-10-11 | End: 2023-12-11

## 2023-10-11 ASSESSMENT — VISUAL ACUITY
METHOD: SNELLEN - LINEAR
CORRECTION_TYPE: GLASSES
OS_CC: 20/40
OD_CC+: -1
OS_CC+: -1
OD_CC: 20/30

## 2023-10-11 ASSESSMENT — TONOMETRY
OD_IOP_MMHG: 14
IOP_METHOD: ICARE
OS_IOP_MMHG: 15

## 2023-10-11 ASSESSMENT — EXTERNAL EXAM - RIGHT EYE: OD_EXAM: 1+ BROW PTOSIS, MILD TO MOD BROW

## 2023-10-11 ASSESSMENT — EXTERNAL EXAM - LEFT EYE: OS_EXAM: 1+ BROW PTOSIS, MILD TO MOD BROW

## 2023-10-11 NOTE — PATIENT INSTRUCTIONS
"Continue Latanoprost (green top) every evening both eyes     Continue Brimonidine (purple top) twice a day both eyes     Use Tobradex drops three times a day in both eyes for 10 days    Use artificial tears up to four times a day (like Refresh Optive or Systane Balance. Avoid \"get the red out\" drops).     Return for glaucoma evaluation as scheduled in Feb 2024 with Dr. Jonathan Louis MD  [phone]   "

## 2023-10-11 NOTE — PROGRESS NOTES
" Current Eye Medications:  Latanoprost at bedtime both eyes, last took at 1 am. Brimonidine twice a day both eyes, last took at midnight. (Hasn't taken drop yet today, normally takes it at 1 pm)     Subjective:  Eyelids swollen, red, and watering left eye > right eye last 15 days. Slight irritations left eye, not painful or burning. When eyes first started bothering, thought he got a piece of wood in eyes while chain sawing. Doesn't feel like something in eyes for last week. Vision is pretty good right eye. Feels vision is little hazy left eye, didn't notice until now.      Objective:  See Ophthalmology Exam.       Assessment:  Marcos Downing is a 83 year old male who presents with:   Encounter Diagnoses   Name Primary?    Acute conjunctivitis of both eyes, unspecified acute conjunctivitis type Mild    Glaucoma suspect, bilateral Continue same medications     Posterior vitreous detachment, left        Plan:  Continue Latanoprost (green top) every evening both eyes     Continue Brimonidine (purple top) twice a day both eyes     Use Tobradex drops three times a day in both eyes for 10 days    Use artificial tears up to four times a day (like Refresh Optive or Systane Balance. Avoid \"get the red out\" drops).     Return for glaucoma evaluation as scheduled in Feb 2024 with Dr. Jonathan Louis MD  [phone]     "

## 2023-10-11 NOTE — LETTER
"    10/11/2023         RE: Marcos Downing  [address] [facility] [address]        Dear Colleague,    Thank you for referring your patient, Marcos Downing, to the [facility]. Please see a copy of my visit note below.     Current Eye Medications:  Latanoprost at bedtime both eyes, last took at 1 am. Brimonidine twice a day both eyes, last took at midnight. (Hasn't taken drop yet today, normally takes it at 1 pm)     Subjective:  Eyelids swollen, red, and watering left eye > right eye last 15 days. Slight irritations left eye, not painful or burning. When eyes first started bothering, thought he got a piece of wood in eyes while chain sawing. Doesn't feel like something in eyes for last week. Vision is pretty good right eye. Feels vision is little hazy left eye, didn't notice until now.      Objective:  See Ophthalmology Exam.       Assessment:  Marcos Downing is a 83 year old male who presents with:   Encounter Diagnoses   Name Primary?     Acute conjunctivitis of both eyes, unspecified acute conjunctivitis type Mild     Glaucoma suspect, bilateral Continue same medications      Posterior vitreous detachment, left        Plan:  Continue Latanoprost (green top) every evening both eyes     Continue Brimonidine (purple top) twice a day both eyes     Use Tobradex drops three times a day in both eyes for 10 days    Use artificial tears up to four times a day (like Refresh Optive or Systane Balance. Avoid \"get the red out\" drops).     Return for glaucoma evaluation as scheduled in Feb 2024 with Dr. Jonathan Louis MD  [phone]     Again, thank you for allowing me to participate in the care of your patient.        Sincerely,        Percy Louis MD  "

## 2023-12-11 ENCOUNTER — OFFICE VISIT (OUTPATIENT)
Dept: OPHTHALMOLOGY | Facility: CLINIC | Age: 83
End: 2023-12-11
Payer: MEDICARE

## 2023-12-11 DIAGNOSIS — H40.003 GLAUCOMA SUSPECT, BILATERAL: Primary | ICD-10-CM

## 2023-12-11 PROCEDURE — 92012 INTRM OPH EXAM EST PATIENT: CPT | Performed by: OPHTHALMOLOGY

## 2023-12-11 RX ORDER — TIMOLOL MALEATE 5 MG/ML
1 SOLUTION/ DROPS OPHTHALMIC DAILY
Qty: 15 ML | Refills: 3 | Status: SHIPPED | OUTPATIENT
Start: 2023-12-11 | End: 2023-12-18 | Stop reason: SINTOL

## 2023-12-11 ASSESSMENT — VISUAL ACUITY
OS_PH_CC: 20/40
OD_PH_CC: 20/30
OD_CC: 20/40
OD_PH_CC+: -2
OD_CC+: +2
OS_CC: 20/40
CORRECTION_TYPE: GLASSES
OS_CC+: -1
OS_PH_CC+: +1
METHOD: SNELLEN - LINEAR

## 2023-12-11 ASSESSMENT — EXTERNAL EXAM - RIGHT EYE: OD_EXAM: 1+ BROW PTOSIS, MILD TO MOD BROW

## 2023-12-11 ASSESSMENT — EXTERNAL EXAM - LEFT EYE: OS_EXAM: 1+ BROW PTOSIS, MILD TO MOD BROW

## 2023-12-11 NOTE — PATIENT INSTRUCTIONS
Continue:   Latanoprost (green top) every evening both eyes.    Begin:  Timolol (yellow top) every morning left eye.    Stop:  Brimonidine (purple top)     Wait at least 5 minutes between drops if using more than one at a time.     Call if problems worsen or fail to improve    Keep scheduled appointment for February     Regis Castillo M.D.  [phone]

## 2023-12-11 NOTE — PROGRESS NOTES
Current Eye Medications:  Latanoprost  every evening both eyes, Brimonidine BID LEFT EYE-was told both eyes last visit, please clarify(started Brimonidine 1-3-23)        Subjective:  here for itchy watery eyes. Started about 2.5 weeks ago with this problem, got the Tobradex drops and it helped. Now for three days it has been worsening again. Left eye is worse than the right eye.      Objective:  See Ophthalmology Exam.       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      Plan:  Continue:   Latanoprost (green top) every evening both eyes.    Begin:  Timolol (yellow top) every morning left eye.    Stop:  Brimonidine (purple top)     Wait at least 5 minutes between drops if using more than one at a time.     Call if problems worsen or fail to improve    Keep scheduled appointment for February     Regis Castillo M.D.  [phone]

## 2023-12-11 NOTE — LETTER
12/11/2023         RE: Marcos Downing  [address] [place] [facility] [id number]        Dear Colleague,    Thank you for referring your patient, Marcos Downing, to the [facility]. Please see a copy of my visit note below.     Current Eye Medications:  Latanoprost  every evening both eyes, Brimonidine BID LEFT EYE-was told both eyes last visit, please clarify(started Brimonidine 1-3-23)        Subjective:  here for itchy watery eyes. Started about 2.5 weeks ago with this problem, got the Tobradex drops and it helped. Now for three days it has been worsening again. Left eye is worse than the right eye.      Objective:  See Ophthalmology Exam.       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      Plan:  Continue:   Latanoprost (green top) every evening both eyes.    Begin:  Timolol (yellow top) every morning left eye.    Stop:  Brimonidine (purple top)     Wait at least 5 minutes between drops if using more than one at a time.     Call if problems worsen or fail to improve    Keep scheduled appointment for February     Regis Castillo M.D.  [phone]            Again, thank you for allowing me to participate in the care of your patient.        Sincerely,        Regis Castillo MD

## 2023-12-18 ENCOUNTER — OFFICE VISIT (OUTPATIENT)
Dept: FAMILY MEDICINE | Facility: CLINIC | Age: 83
End: 2023-12-18
Payer: MEDICARE

## 2023-12-18 VITALS
HEART RATE: 73 BPM | RESPIRATION RATE: 17 BRPM | DIASTOLIC BLOOD PRESSURE: 71 MMHG | SYSTOLIC BLOOD PRESSURE: 130 MMHG | WEIGHT: 169.4 LBS | HEIGHT: 72 IN | BODY MASS INDEX: 22.94 KG/M2 | OXYGEN SATURATION: 97 %

## 2023-12-18 DIAGNOSIS — Z23 NEED FOR SHINGLES VACCINE: ICD-10-CM

## 2023-12-18 DIAGNOSIS — Z00.00 ENCOUNTER FOR MEDICARE ANNUAL WELLNESS EXAM: Primary | ICD-10-CM

## 2023-12-18 DIAGNOSIS — Z29.11 NEED FOR VACCINATION AGAINST RESPIRATORY SYNCYTIAL VIRUS: ICD-10-CM

## 2023-12-18 DIAGNOSIS — L29.9 ITCHING: ICD-10-CM

## 2023-12-18 DIAGNOSIS — J30.9 ALLERGIC RHINITIS, UNSPECIFIED SEASONALITY, UNSPECIFIED TRIGGER: ICD-10-CM

## 2023-12-18 DIAGNOSIS — E11.9 TYPE 2 DIABETES MELLITUS WITHOUT RETINOPATHY (H): ICD-10-CM

## 2023-12-18 DIAGNOSIS — E11.69 TYPE 2 DIABETES MELLITUS WITH OTHER SPECIFIED COMPLICATION, WITHOUT LONG-TERM CURRENT USE OF INSULIN (H): ICD-10-CM

## 2023-12-18 LAB
ALBUMIN SERPL BCG-MCNC: 3.9 G/DL (ref 3.5–5.2)
ALP SERPL-CCNC: 76 U/L (ref 40–150)
ALT SERPL W P-5'-P-CCNC: 19 U/L (ref 0–70)
ANION GAP SERPL CALCULATED.3IONS-SCNC: 9 MMOL/L (ref 7–15)
AST SERPL W P-5'-P-CCNC: 19 U/L (ref 0–45)
BILIRUB SERPL-MCNC: 0.4 MG/DL
BUN SERPL-MCNC: 24.8 MG/DL (ref 8–23)
CALCIUM SERPL-MCNC: 8.9 MG/DL (ref 8.8–10.2)
CHLORIDE SERPL-SCNC: 105 MMOL/L (ref 98–107)
CHOLEST SERPL-MCNC: 180 MG/DL
CREAT SERPL-MCNC: 0.97 MG/DL (ref 0.67–1.17)
CREAT UR-MCNC: 155 MG/DL
DEPRECATED HCO3 PLAS-SCNC: 25 MMOL/L (ref 22–29)
EGFRCR SERPLBLD CKD-EPI 2021: 77 ML/MIN/1.73M2
FASTING STATUS PATIENT QL REPORTED: YES
GLUCOSE SERPL-MCNC: 182 MG/DL (ref 70–99)
HBA1C MFR BLD: 7 % (ref 0–5.6)
HDLC SERPL-MCNC: 34 MG/DL
LDLC SERPL CALC-MCNC: 130 MG/DL
MICROALBUMIN UR-MCNC: <12 MG/L
MICROALBUMIN/CREAT UR: NORMAL MG/G{CREAT}
NONHDLC SERPL-MCNC: 146 MG/DL
POTASSIUM SERPL-SCNC: 4.2 MMOL/L (ref 3.4–5.3)
PROT SERPL-MCNC: 6.6 G/DL (ref 6.4–8.3)
SODIUM SERPL-SCNC: 139 MMOL/L (ref 135–145)
TRIGL SERPL-MCNC: 79 MG/DL

## 2023-12-18 PROCEDURE — G0439 PPPS, SUBSEQ VISIT: HCPCS | Performed by: FAMILY MEDICINE

## 2023-12-18 PROCEDURE — G0008 ADMIN INFLUENZA VIRUS VAC: HCPCS | Performed by: FAMILY MEDICINE

## 2023-12-18 PROCEDURE — 82043 UR ALBUMIN QUANTITATIVE: CPT | Performed by: FAMILY MEDICINE

## 2023-12-18 PROCEDURE — 99207 PR FOOT EXAM NO CHARGE: CPT | Performed by: FAMILY MEDICINE

## 2023-12-18 PROCEDURE — 90662 IIV NO PRSV INCREASED AG IM: CPT | Performed by: FAMILY MEDICINE

## 2023-12-18 PROCEDURE — 83036 HEMOGLOBIN GLYCOSYLATED A1C: CPT | Performed by: FAMILY MEDICINE

## 2023-12-18 PROCEDURE — 36415 COLL VENOUS BLD VENIPUNCTURE: CPT | Performed by: FAMILY MEDICINE

## 2023-12-18 PROCEDURE — 80053 COMPREHEN METABOLIC PANEL: CPT | Performed by: FAMILY MEDICINE

## 2023-12-18 PROCEDURE — 80061 LIPID PANEL: CPT | Performed by: FAMILY MEDICINE

## 2023-12-18 PROCEDURE — 82570 ASSAY OF URINE CREATININE: CPT | Performed by: FAMILY MEDICINE

## 2023-12-18 RX ORDER — IPRATROPIUM BROMIDE 42 UG/1
2 SPRAY, METERED NASAL 3 TIMES DAILY PRN
Qty: 15 ML | Refills: 1 | Status: SHIPPED | OUTPATIENT
Start: 2023-12-18

## 2023-12-18 RX ORDER — RESPIRATORY SYNCYTIAL VIRUS VACCINE 120MCG/0.5
0.5 KIT INTRAMUSCULAR ONCE
Qty: 1 EACH | Refills: 0 | Status: SHIPPED | OUTPATIENT
Start: 2023-12-18 | End: 2023-12-18

## 2023-12-18 ASSESSMENT — ENCOUNTER SYMPTOMS
JOINT SWELLING: 0
DYSURIA: 0
ARTHRALGIAS: 0
PALPITATIONS: 0
DIZZINESS: 0
EYE PAIN: 1
FEVER: 0
COUGH: 0
SORE THROAT: 0
FREQUENCY: 1
HEARTBURN: 0
CHILLS: 0
HEADACHES: 0
NAUSEA: 0
WEAKNESS: 0
SHORTNESS OF BREATH: 0
CONSTIPATION: 0
HEMATURIA: 0
NERVOUS/ANXIOUS: 0
ABDOMINAL PAIN: 0
MYALGIAS: 0
HEMATOCHEZIA: 0
DIARRHEA: 0
PARESTHESIAS: 1

## 2023-12-18 ASSESSMENT — PAIN SCALES - GENERAL: PAINLEVEL: NO PAIN (0)

## 2023-12-18 ASSESSMENT — ACTIVITIES OF DAILY LIVING (ADL): CURRENT_FUNCTION: NO ASSISTANCE NEEDED

## 2023-12-18 NOTE — LETTER
December 20, 2023    Marcos Downing  [address] [place] [address]          Dear ,    We are writing to inform you of your test results.   Your LDL cholesterol and A1c have bumped up a little; watching diet more closely will help.  Liver and kidney function are normal.       Resulted Orders   Lipid panel reflex to direct LDL Non-fasting   Result Value Ref Range    Cholesterol 180 <200 mg/dL    Triglycerides 79 <150 mg/dL    Direct Measure HDL 34 (L) >=40 mg/dL    LDL Cholesterol Calculated 130 (H) <=100 mg/dL    Non HDL Cholesterol 146 (H) <130 mg/dL    Patient Fasting > 8hrs? Yes     Narrative    Cholesterol  Desirable:  <200 mg/dL    Triglycerides  Normal:  Less than 150 mg/dL  Borderline High:  150-199 mg/dL  High:  200-499 mg/dL  Very High:  Greater than or equal to 500 mg/dL    Direct Measure HDL  Female:  Greater than or equal to 50 mg/dL   Male:  Greater than or equal to 40 mg/dL    LDL Cholesterol  Desirable:  <100mg/dL  Above Desirable:  100-129 mg/dL   Borderline High:  130-159 mg/dL   High:  160-189 mg/dL   Very High:  >= 190 mg/dL    Non HDL Cholesterol  Desirable:  130 mg/dL  Above Desirable:  130-159 mg/dL  Borderline High:  160-189 mg/dL  High:  190-219 mg/dL  Very High:  Greater than or equal to 220 mg/dL   HEMOGLOBIN A1C   Result Value Ref Range    Hemoglobin A1C 7.0 (H) 0.0 - 5.6 %      Comment:      Normal <5.7%   Prediabetes 5.7-6.4%    Diabetes 6.5% or higher     Note: Adopted from ADA consensus guidelines.   Albumin Random Urine Quantitative with Creat Ratio   Result Value Ref Range    Creatinine Urine mg/dL 155.0 mg/dL      Comment:      The reference ranges have not been established in urine creatinine. The results should be integrated into the clinical context for interpretation.    Albumin Urine mg/L <12.0 mg/L      Comment:      The reference ranges have not been established in urine albumin. The results should be integrated into the clinical context for  interpretation.    Albumin Urine mg/g Cr        Comment:      Unable to calculate, urine albumin and/or urine creatinine is outside detectable limits.  Microalbuminuria is defined as an albumin:creatinine ratio of 17 to 299 for males and 25 to 299 for females. A ratio of albumin:creatinine of 300 or higher is indicative of overt proteinuria.  Due to biologic variability, positive results should be confirmed by a second, first-morning random or 24-hour timed urine specimen. If there is discrepancy, a third specimen is recommended. When 2 out of 3 results are in the microalbuminuria range, this is evidence for incipient nephropathy and warrants increased efforts at glucose control, blood pressure control, and institution of therapy with an angiotensin-converting-enzyme (ACE) inhibitor (if the patient can tolerate it).     Comprehensive metabolic panel (BMP + Alb, Alk Phos, ALT, AST, Total. Bili, TP)   Result Value Ref Range    Sodium 139 135 - 145 mmol/L      Comment:      Reference intervals for this test were updated on 09/26/2023 to more accurately reflect our healthy population. There may be differences in the flagging of prior results with similar values performed with this method. Interpretation of those prior results can be made in the context of the updated reference intervals.     Potassium 4.2 3.4 - 5.3 mmol/L    Carbon Dioxide (CO2) 25 22 - 29 mmol/L    Anion Gap 9 7 - 15 mmol/L    Urea Nitrogen 24.8 (H) 8.0 - 23.0 mg/dL    Creatinine 0.97 0.67 - 1.17 mg/dL    GFR Estimate 77 >60 mL/min/1.73m2    Calcium 8.9 8.8 - 10.2 mg/dL    Chloride 105 98 - 107 mmol/L    Glucose 182 (H) 70 - 99 mg/dL    Alkaline Phosphatase 76 40 - 150 U/L      Comment:      Reference intervals for this test were updated on 11/14/2023 to more accurately reflect our healthy population. There may be differences in the flagging of prior results with similar values performed with this method. Interpretation of those prior results can be  made in the context of the updated reference intervals.    AST 19 0 - 45 U/L      Comment:      Reference intervals for this test were updated on 6/12/2023 to more accurately reflect our healthy population. There may be differences in the flagging of prior results with similar values performed with this method. Interpretation of those prior results can be made in the context of the updated reference intervals.    ALT 19 0 - 70 U/L      Comment:      Reference intervals for this test were updated on 6/12/2023 to more accurately reflect our healthy population. There may be differences in the flagging of prior results with similar values performed with this method. Interpretation of those prior results can be made in the context of the updated reference intervals.      Protein Total 6.6 6.4 - 8.3 g/dL    Albumin 3.9 3.5 - 5.2 g/dL    Bilirubin Total 0.4 <=1.2 mg/dL       If you have any questions or concerns, please call the clinic at the number listed above.       Sincerely,      Lyndon Patrick MD

## 2023-12-18 NOTE — PROGRESS NOTES
"SUBJECTIVE:   Marcos is a 83 year old, presenting for the following:  Physical        12/18/2023     7:26 AM   Additional Questions   Roomed by RADHA TAYLOR   Accompanied by self     Are you in the first 12 months of your Medicare coverage?  No    Healthy Habits:     In general, how would you rate your overall health?  Excellent    Frequency of exercise:  4-5 days/week    Duration of exercise:  30-45 minutes    Do you usually eat at least 4 servings of fruit and vegetables a day, include whole grains    & fiber and avoid regularly eating high fat or \"junk\" foods?  Yes    Taking medications regularly:  Yes    Medication side effects:  Other    Ability to successfully perform activities of daily living:  No assistance needed    Home Safety:  No safety concerns identified    Hearing Impairment:  No hearing concerns    In the past 6 months, have you been bothered by leaking of urine?  No    In general, how would you rate your overall mental or emotional health?  Excellent    Discuss metformin       Been feeling itchy along for 6-8 months, no rash    Nasal discharge:     Was on allegra; will do Atrovent    Started PomBerry: takes 2 capsules daily   Helping with mental alertness and seems to help in many    Today's PHQ-2 Score:       12/18/2023     6:54 AM   PHQ-2 ( 1999 [employer])   Q1: Little interest or pleasure in doing things 0   Q2: Feeling down, depressed or hopeless 0   PHQ-2 Score 0   Q1: Little interest or pleasure in doing things Not at all   Q2: Feeling down, depressed or hopeless Not at all   PHQ-2 Score 0     Have you ever done Advance Care Planning? (For example, a Health Directive, POLST, or a discussion with a medical provider or your loved ones about your wishes): Yes, patient states has an Advance Care Planning document and will bring a copy to the clinic.     Fall risk  Fallen 2 or more times in the past year?: No  Any fall with injury in the past year?: No    Cognitive Screening   1) Repeat 3 items (Leader, " Season, Table)    2) Clock draw: NORMAL  3) 3 item recall: Recalls 3 objects  Results: 3 items recalled: COGNITIVE IMPAIRMENT LESS LIKELY    Mini-CogTM Copyright AVA Downing. Licensed by the author for use in [facility]; reprinted with permission ([email or web address]). All rights reserved.      Do you have sleep apnea, excessive snoring or daytime drowsiness? : no    Reviewed and updated as needed this visit by clinical staff   Tobacco  Allergies  Meds              Reviewed and updated as needed this visit by Provider                 Social History     Tobacco Use    Smoking status: Former     Types: Cigarettes     Quit date: 1971     Years since quittin.7    Smokeless tobacco: Never   Substance Use Topics    Alcohol use: Yes     Comment: special occasions only     Hearing: stable    Vision: seeing ophthalmology    ADLs: good    Living arrangement: lives alone in his home,  wife in a nursing home due to dementia          2023     6:53 AM   Alcohol Use   Prescreen: >3 drinks/day or >7 drinks/week? No          No data to display              Do you have a current opioid prescription? No  Do you use any other controlled substances or medications that are not prescribed by a provider? None      Diabetes Follow-up    How often are you checking your blood sugar? Not at all  What concerns do you have today about your diabetes? None   Do you have any of these symptoms? (Select all that apply)  No numbness or tingling in feet.  No redness, sores or blisters on feet.  No complaints of excessive thirst.  No reports of blurry vision.  No significant changes to weight.      BP Readings from Last 2 Encounters:   23 130/71   22 116/63     Hemoglobin A1C (%)   Date Value   2022 6.1 (H)   2022 6.3 (H)   2021 6.7 (H)   10/20/2020 6.5 (H)     LDL Cholesterol Calculated (mg/dL)   Date Value   2022 107 (H)   10/20/2020 115 (H)   2020 106 (H)       Hyperlipidemia  Follow-Up    Are you regularly taking any medication or supplement to lower your cholesterol?   None  Are you having muscle aches or other side effects that you think could be caused by your cholesterol lowering medication?  No    Current providers sharing in care for this patient include:   Patient Care Team:  Lyndon Patrick MD as PCP - General (Family Practice)  Lyndon Patrick MD as Assigned PCP  Regis Castillo MD as Assigned Surgical Provider    The following health maintenance items are reviewed in Epic and correct as of today:  Health Maintenance   Topic Date Due    RSV VACCINE (Pregnancy & 60+) (1 - 1-dose 60+ series) Never done    ZOSTER IMMUNIZATION (2 of 3) 01/01/2014    ANNUAL REVIEW OF HM ORDERS  11/09/2022    BMP  05/13/2023    LIPID  05/13/2023    DIABETIC FOOT EXAM  05/13/2023    A1C  06/12/2023    INFLUENZA VACCINE (1) 09/01/2023    COVID-19 Vaccine (4 - 2023-24 season) 09/01/2023    MICROALBUMIN  12/12/2023    MEDICARE ANNUAL WELLNESS VISIT  12/12/2023    EYE EXAM  12/11/2024    FALL RISK ASSESSMENT  12/18/2024    ADVANCE CARE PLANNING  12/13/2027    DTAP/TDAP/TD IMMUNIZATION (2 - Td or Tdap) 12/12/2032    PHQ-2 (once per calendar year)  Completed    Pneumococcal Vaccine: 65+ Years  Completed    IPV IMMUNIZATION  Aged Out    HPV IMMUNIZATION  Aged Out    MENINGITIS IMMUNIZATION  Aged Out    RSV MONOCLONAL ANTIBODY  Aged Out     Patient Active Problem List   Diagnosis    Hyperlipidemia LDL goal <100    Type 2 diabetes mellitus with other specified complication, without long-term current use of insulin (H)    Type 2 diabetes mellitus without retinopathy (H)    Glaucoma suspect, bilateral - treated    Posterior vitreous detachment, left    Combined form of age-related cataract, mild-mod, both eyes     Past Surgical History:   Procedure Laterality Date    CYSTOSCOPY, DILATE URETHRA, COMBINED  1996    TONSILLECTOMY      T & A  age 3    VASECTOMY  Age 39       Social History     Tobacco  "Use    Smoking status: Former     Types: Cigarettes     Quit date: 1971     Years since quittin.7    Smokeless tobacco: Never   Substance Use Topics    Alcohol use: Yes     Comment: special occasions only     Family History   Problem Relation Age of Onset    Diabetes Mother     Heart Disease Mother     Diabetes Father     Diabetes Brother     Cancer Brother     Cancer Sister     Diabetes Sister     Diabetes Brother     Glaucoma No family hx of     Macular Degeneration No family hx of              Review of Systems   Constitutional:  Negative for chills and fever.   HENT:  Negative for congestion, ear pain, hearing loss and sore throat.    Eyes:  Positive for pain. Negative for visual disturbance.   Respiratory:  Negative for cough and shortness of breath.    Cardiovascular:  Negative for chest pain, palpitations and peripheral edema.   Gastrointestinal:  Negative for abdominal pain, constipation, diarrhea, heartburn, hematochezia and nausea.   Genitourinary:  Positive for frequency and urgency. Negative for dysuria, genital sores, hematuria, impotence and penile discharge.   Musculoskeletal:  Negative for arthralgias, joint swelling and myalgias.   Skin:  Negative for rash.   Neurological:  Positive for paresthesias. Negative for dizziness, weakness and headaches.   Psychiatric/Behavioral:  Negative for mood changes. The patient is not nervous/anxious.      OBJECTIVE:   /71 (BP Location: Left arm, Cuff Size: Adult Regular)   Pulse 73   Resp 17   Ht 1.82 m (5' 11.65\")   Wt 76.8 kg (169 lb 6.4 oz)   SpO2 97%   BMI 23.20 kg/m   Estimated body mass index is 23.2 kg/m  as calculated from the following:    Height as of this encounter: 1.82 m (5' 11.65\").    Weight as of this encounter: 76.8 kg (169 lb 6.4 oz).  Physical Exam  GENERAL: healthy, alert and no distress  EYES: Eyes grossly normal to inspection, PERRL and conjunctivae and sclerae normal  HENT: ear canals and TM's normal, nose and mouth " without ulcers or lesions  NECK: no adenopathy, no asymmetry, masses, or scars and thyroid normal to palpation  RESP: lungs clear to auscultation - no rales, rhonchi or wheezes  CV: regular rate and rhythm, normal S1 S2, no S3 or S4, no murmur, click or rub, no peripheral edema   ABDOMEN: soft, nontender, no hepatosplenomegaly, no masses and bowel sounds normal  MS: no gross musculoskeletal defects noted, no edema  SKIN: no suspicious lesions or rashes  NEURO: Normal strength and tone, mentation intact and speech normal  PSYCH: mentation appears normal, affect normal/bright    Diagnostic Test Results:  Labs reviewed in Epic    ASSESSMENT / PLAN:   Marcos was seen today for physical.    Diagnoses and all orders for this visit:    Encounter for Medicare annual wellness exam  -     Comprehensive metabolic panel (BMP + Alb, Alk Phos, ALT, AST, Total. Bili, TP); Future  -     respiratory syncytial virus vaccine, bivalent (ABRYSVO) injection; Inject 0.5 mLs into the muscle once for 1 dose  -     zoster vaccine recombinant adjuvanted (SHINGRIX) injection; Inject 0.5 mLs into the muscle once for 1 dose Pharmacist administered  -     Comprehensive metabolic panel (BMP + Alb, Alk Phos, ALT, AST, Total. Bili, TP)    Type 2 diabetes mellitus with other specified complication, without long-term current use of insulin (H)  -     FOOT EXAM  -     Comprehensive metabolic panel (BMP + Alb, Alk Phos, ALT, AST, Total. Bili, TP); Future  -     Comprehensive metabolic panel (BMP + Alb, Alk Phos, ALT, AST, Total. Bili, TP)    Type 2 diabetes mellitus without retinopathy (H)  -     Lipid panel reflex to direct LDL Non-fasting; Future  -     HEMOGLOBIN A1C; Future  -     Albumin Random Urine Quantitative with Creat Ratio; Future  -     Lipid panel reflex to direct LDL Non-fasting  -     HEMOGLOBIN A1C  -     Albumin Random Urine Quantitative with Creat Ratio    Allergic rhinitis, unspecified seasonality, unspecified trigger  -      ipratropium (ATROVENT) 0.06 % nasal spray; Spray 2 sprays into both nostrils 3 times daily as needed for rhinitis    Itching: forearms    Need for vaccination against respiratory syncytial virus    Need for shingles vaccine    Other orders  -     PRIMARY CARE FOLLOW-UP SCHEDULING; Future  -     INFLUENZA VACCINE 65+ (FLUZONE HD)        Patient has been advised of split billing requirements and indicates understanding: Yes      COUNSELING:  Reviewed preventive health counseling, as reflected in patient instructions       Regular exercise       Healthy diet/nutrition       Bladder control       Fall risk prevention       Immunizations  Vaccinated for: Influenza and Zoster          He reports that he quit smoking about 52 years ago. His smoking use included cigarettes. He has never used smokeless tobacco.      Appropriate preventive services were discussed with this patient, including applicable screening as appropriate for fall prevention, nutrition, physical activity, Tobacco-use cessation, weight loss and cognition.  Checklist reviewing preventive services available has been given to the patient.    Reviewed patients plan of care and provided an AVS. The Basic Care Plan (routine screening as documented in Health Maintenance) for Marcos meets the Care Plan requirement. This Care Plan has been established and reviewed with the Patient.    {Counseling Resources  [place] Preventive Services Task Force  Cholesterol Screening  Health diet/nutrition  Pooled Cohorts Equation Calculator  USDA's MyPlate  ASA Prophylaxis  Lung CA Screening  Osteoporosis prevention/bone health   {Prostate Cancer Screening  Consider for men 55-69 per guidance from USPSTF     Lyndon Patrick MD  [facility]    Identified Health Risks:  I have reviewed Opioid Use Disorder and Substance Use Disorder risk factors and made any needed referrals.

## 2023-12-18 NOTE — PATIENT INSTRUCTIONS
Patient Education   Personalized Prevention Plan  You are due for the preventive services outlined below.  Your care team is available to assist you in scheduling these services.  If you have already completed any of these items, please share that information with your care team to update in your medical record.  Health Maintenance Due   Topic Date Due     RSV VACCINE (Pregnancy & 60+) (1 - 1-dose 60+ series) Never done     Zoster (Shingles) Vaccine (2 of 3) 01/01/2014     ANNUAL REVIEW OF HM ORDERS  11/09/2022     Basic Metabolic Panel  05/13/2023     Cholesterol Lab  05/13/2023     Diabetic Foot Exam  05/13/2023     A1C Lab  06/12/2023     Flu Vaccine (1) 09/01/2023     COVID-19 Vaccine (4 - 2023-24 season) 09/01/2023     Kidney Microalbumin Urine Test  12/12/2023     Annual Wellness Visit  12/12/2023

## 2024-02-08 DIAGNOSIS — E11.65 TYPE 2 DIABETES MELLITUS WITH HYPERGLYCEMIA, WITHOUT LONG-TERM CURRENT USE OF INSULIN (H): ICD-10-CM

## 2024-02-08 RX ORDER — METFORMIN HCL 500 MG
TABLET, EXTENDED RELEASE 24 HR ORAL
Qty: 180 TABLET | Refills: 1 | Status: SHIPPED | OUTPATIENT
Start: 2024-02-08 | End: 2024-08-13

## 2024-02-26 ENCOUNTER — OFFICE VISIT (OUTPATIENT)
Dept: OPHTHALMOLOGY | Facility: CLINIC | Age: 84
End: 2024-02-26
Payer: MEDICARE

## 2024-02-26 DIAGNOSIS — H25.813 COMBINED FORM OF AGE-RELATED CATARACT, BOTH EYES: ICD-10-CM

## 2024-02-26 DIAGNOSIS — Z01.01 ENCOUNTER FOR EXAMINATION OF EYES AND VISION WITH ABNORMAL FINDINGS: ICD-10-CM

## 2024-02-26 DIAGNOSIS — H43.812 POSTERIOR VITREOUS DETACHMENT, LEFT: ICD-10-CM

## 2024-02-26 DIAGNOSIS — H52.4 PRESBYOPIA: ICD-10-CM

## 2024-02-26 DIAGNOSIS — E11.9 TYPE 2 DIABETES MELLITUS WITHOUT RETINOPATHY (H): Primary | ICD-10-CM

## 2024-02-26 DIAGNOSIS — H40.003 GLAUCOMA SUSPECT, BILATERAL: ICD-10-CM

## 2024-02-26 PROCEDURE — 92014 COMPRE OPH EXAM EST PT 1/>: CPT | Performed by: OPHTHALMOLOGY

## 2024-02-26 PROCEDURE — 92015 DETERMINE REFRACTIVE STATE: CPT | Mod: GY | Performed by: OPHTHALMOLOGY

## 2024-02-26 ASSESSMENT — REFRACTION_WEARINGRX
OS_SPHERE: +4.25
OD_CYLINDER: +1.00
OD_SPHERE: +1.25
SPECS_TYPE: BIFOCAL
OD_AXIS: 162
OS_AXIS: 001
OD_AXIS: 158
OD_ADD: +3.00
OS_CYLINDER: +1.00
OS_SPHERE: +1.50
OS_ADD: +3.00
OS_AXIS: 004
OD_SPHERE: +4.00
SPECS_TYPE: SVL;RDNG
OD_CYLINDER: +1.00
OS_CYLINDER: +1.00

## 2024-02-26 ASSESSMENT — CONF VISUAL FIELD
OD_SUPERIOR_TEMPORAL_RESTRICTION: 0
OD_NORMAL: 1
OS_SUPERIOR_NASAL_RESTRICTION: 0
OS_SUPERIOR_TEMPORAL_RESTRICTION: 0
OD_SUPERIOR_NASAL_RESTRICTION: 0
OS_INFERIOR_NASAL_RESTRICTION: 0
OS_INFERIOR_TEMPORAL_RESTRICTION: 0
OD_INFERIOR_TEMPORAL_RESTRICTION: 0
OS_NORMAL: 1
OD_INFERIOR_NASAL_RESTRICTION: 0

## 2024-02-26 ASSESSMENT — REFRACTION_MANIFEST
OS_AXIS: 006
OS_CYLINDER: +1.50
OD_AXIS: 170
OD_SPHERE: +0.50
OD_CYLINDER: +1.50
OS_SPHERE: +1.25
OD_ADD: +3.00
OS_ADD: +3.00

## 2024-02-26 ASSESSMENT — VISUAL ACUITY
METHOD: SNELLEN - LINEAR
OD_CC: 20/40
CORRECTION_TYPE: GLASSES
OS_CC: 20/60
OD_CC+: -1

## 2024-02-26 ASSESSMENT — CUP TO DISC RATIO
OS_RATIO: 0.7
OD_RATIO: 0.6

## 2024-02-26 ASSESSMENT — TONOMETRY
IOP_METHOD: APPLANATION
OD_IOP_MMHG: 14
OS_IOP_MMHG: 17

## 2024-02-26 ASSESSMENT — EXTERNAL EXAM - RIGHT EYE: OD_EXAM: 1+ BROW PTOSIS, MILD TO MOD BROW

## 2024-02-26 ASSESSMENT — EXTERNAL EXAM - LEFT EYE: OS_EXAM: 1+ BROW PTOSIS, MILD TO MOD BROW

## 2024-02-26 NOTE — PROGRESS NOTES
" Current Eye Medications:    Latanoprost (green top) every evening both eyes.  Timolol (yellow top) every morning left eye.     Subjective: Here for complete eye exam. Left eye is feeling much better compared to last visit. Patient complains of having some difficulty reading the words on the screen at [employer]. Patient states that he is pre diabetic.     Lab Results   Component Value Date    A1C 7.0 12/18/2023    A1C 6.1 12/12/2022    A1C 6.3 05/13/2022    A1C 7.3 11/09/2021    A1C 6.7 03/31/2021    A1C 6.5 10/20/2020    A1C 7.6 06/16/2020    A1C 8.7 01/09/2020    A1C 7.3 09/09/2019     Still mostly satisfied with current visual status.     Objective:  See Ophthalmology Exam.       Assessment:  Symptomatically improved left eye after discontinuing Brimonidine. Cataracts visually significant.  Intraocular pressures and discs mostly stable.  No diabetic retinopathy.      ICD-10-CM    1. Type 2 diabetes mellitus without retinopathy (H)  E11.9       2. Combined form of age-related cataract, mild-mod, both eyes  H25.813       3. Glaucoma suspect, bilateral - treated  H40.003       4. Posterior vitreous detachment, left  H43.812       5. Encounter for examination of eyes and vision with abnormal findings  Z01.01       6. Presbyopia  H52.4            Plan:  Continue:   Latanoprost (green top) every evening both eyes.  Timolol (yellow top) every morning left eye.      May use artificial tears up to four times a day (like Refresh Optive, Systane Balance, or TheraTears. Avoid \"get the red out\" drops and generic artifical tears).     Wait at least 5 minutes between drops if using more than one at a time.     Glasses prescription given - optional    Discussed referral for cataract/glaucoma surgery anytime.  Cataract brochure given.    Return visit 6 months for an intraocular pressure check, glaucoma OCT, retinal OCT, and Blankenship Visual Field.     Regis Castillo M.D.  [phone]       "

## 2024-02-26 NOTE — PATIENT INSTRUCTIONS
"Continue:   Latanoprost (green top) every evening both eyes.  Timolol (yellow top) every morning left eye.      May use artificial tears up to four times a day (like Refresh Optive, Systane Balance, or TheraTears. Avoid \"get the red out\" drops and generic artifical tears).     Wait at least 5 minutes between drops if using more than one at a time.     Glasses prescription given - optional    Return visit 6 months for an intraocular pressure check, glaucoma OCT, retinal OCT, and Blankenship Visual Field.     Regis Castillo M.D.  [phone]      Patient Education   Diabetes weakens the blood vessels all over the body, including the eyes. Damage to the blood vessels in the eyes can cause swelling or bleeding into part of the eye (called the retina). This is called diabetic retinopathy (SHALONDA-tin--[facility]-thee). If not treated, this disease can cause vision loss or blindness.   Symptoms may include blurred or distorted vision, but many people have no symptoms. It's important to see your eye doctor regularly to check for problems.   Early treatment and good control can help protect your vision. Here are the things you can do to help prevent vision loss:      1. Keep your blood sugar levels under tight control.      2. Bring high blood pressure under control.      3. No smoking.      4. Have yearly dilated eye exams.     "

## 2024-02-26 NOTE — LETTER
"    2/26/2024         RE: Marcos Downing  [address] [facility] [place] [facility] [address]        Dear Colleague,    Thank you for referring your patient, Marcos Downing, to the [facility]. Please see a copy of my visit note below.     Current Eye Medications:    Latanoprost (green top) every evening both eyes.  Timolol (yellow top) every morning left eye.     Subjective: Here for complete eye exam. Left eye is feeling much better compared to last visit. Patient complains of having some difficulty reading the words on the screen at [employer]. Patient states that he is pre diabetic.     Lab Results   Component Value Date    A1C 7.0 12/18/2023    A1C 6.1 12/12/2022    A1C 6.3 05/13/2022    A1C 7.3 11/09/2021    A1C 6.7 03/31/2021    A1C 6.5 10/20/2020    A1C 7.6 06/16/2020    A1C 8.7 01/09/2020    A1C 7.3 09/09/2019     Still mostly satisfied with current visual status.     Objective:  See Ophthalmology Exam.       Assessment:  Symptomatically improved left eye after discontinuing Brimonidine. Cataracts visually significant.  Intraocular pressures and discs mostly stable.  No diabetic retinopathy.      ICD-10-CM    1. Type 2 diabetes mellitus without retinopathy (H)  E11.9       2. Combined form of age-related cataract, mild-mod, both eyes  H25.813       3. Glaucoma suspect, bilateral - treated  H40.003       4. Posterior vitreous detachment, left  H43.812       5. Encounter for examination of eyes and vision with abnormal findings  Z01.01       6. Presbyopia  H52.4            Plan:  Continue:   Latanoprost (green top) every evening both eyes.  Timolol (yellow top) every morning left eye.      May use artificial tears up to four times a day (like Refresh Optive, Systane Balance, or TheraTears. Avoid \"get the red out\" drops and generic artifical tears).     Wait at least 5 minutes between drops if using more than one at a time.     Glasses prescription given - optional    Discussed referral for " cataract/glaucoma surgery anytime.  Cataract brochure given.    Return visit 6 months for an intraocular pressure check, glaucoma OCT, retinal OCT, and Blankenship Visual Field.     Regis Csatillo M.D.  [phone]         Again, thank you for allowing me to participate in the care of your patient.        Sincerely,        Regis Castillo MD

## 2024-03-06 ENCOUNTER — TELEPHONE (OUTPATIENT)
Dept: OPHTHALMOLOGY | Facility: CLINIC | Age: 84
End: 2024-03-06
Payer: MEDICARE

## 2024-03-06 DIAGNOSIS — H40.003 GLAUCOMA SUSPECT, BILATERAL: Primary | ICD-10-CM

## 2024-03-06 RX ORDER — TIMOLOL MALEATE 5 MG/ML
1 SOLUTION/ DROPS OPHTHALMIC EVERY MORNING
Qty: 10 ML | Refills: 3 | Status: SHIPPED | OUTPATIENT
Start: 2024-03-06

## 2024-03-06 RX ORDER — LATANOPROST 50 UG/ML
1 SOLUTION/ DROPS OPHTHALMIC AT BEDTIME
Qty: 7.5 ML | Refills: 3 | Status: SHIPPED | OUTPATIENT
Start: 2024-03-06

## 2024-03-06 NOTE — TELEPHONE ENCOUNTER
Pt is in need of Latanoprost and Timolol eye drops and it looks like our prescriptions were cancelled electronically in December.  Thank You,  Nanci Stone, [facility] [place]

## 2024-06-30 ENCOUNTER — HEALTH MAINTENANCE LETTER (OUTPATIENT)
Age: 84
End: 2024-06-30

## 2024-08-12 DIAGNOSIS — E11.65 TYPE 2 DIABETES MELLITUS WITH HYPERGLYCEMIA, WITHOUT LONG-TERM CURRENT USE OF INSULIN (H): ICD-10-CM

## 2024-08-13 RX ORDER — METFORMIN HCL 500 MG
1000 TABLET, EXTENDED RELEASE 24 HR ORAL
Qty: 180 TABLET | Refills: 1 | Status: SHIPPED | OUTPATIENT
Start: 2024-08-13

## 2024-11-26 ENCOUNTER — OFFICE VISIT (OUTPATIENT)
Dept: FAMILY MEDICINE | Facility: CLINIC | Age: 84
End: 2024-11-26
Payer: MEDICARE

## 2024-11-26 VITALS
WEIGHT: 160.2 LBS | HEART RATE: 83 BPM | OXYGEN SATURATION: 98 % | HEIGHT: 72 IN | DIASTOLIC BLOOD PRESSURE: 74 MMHG | SYSTOLIC BLOOD PRESSURE: 120 MMHG | TEMPERATURE: 97.7 F | BODY MASS INDEX: 21.7 KG/M2 | RESPIRATION RATE: 16 BRPM

## 2024-11-26 DIAGNOSIS — L84 CORN OR CALLUS: Primary | ICD-10-CM

## 2024-11-26 PROCEDURE — 99213 OFFICE O/P EST LOW 20 MIN: CPT | Performed by: FAMILY MEDICINE

## 2024-11-26 ASSESSMENT — PAIN SCALES - GENERAL: PAINLEVEL_OUTOF10: NO PAIN (0)

## 2024-11-26 NOTE — PROGRESS NOTES
"  Assessment & Plan     Corn or callus Lateral to 5th toe bilaterally Lt > Rt)   I discussed the development of calluses.  Encouraged avoiding friction, poor fitting shoes etc.  Discussed various treatments of calluses including paring, pumice stones, keratolytics and moisturizers. Recheck in 1-2 months    See Patient Instructions    Murali Rodríguez is a 84 year old, presenting for the following health issues:  Sore Feet       11/26/2024     2:35 PM   Additional Questions   Roomed by suad Fay ma   Accompanied by self     History of Present Illness       Reason for visit:  Sore feet   He is taking medications regularly.            Objective    /74 (BP Location: Right arm, Cuff Size: Adult Large)   Pulse 83   Temp 97.7  F (36.5  C) (Temporal)   Resp 16   Ht 1.82 m (5' 11.65\")   Wt 72.7 kg (160 lb 3.2 oz)   SpO2 98%   BMI 21.94 kg/m    Body mass index is 21.94 kg/m .  Physical Exam   GENERAL: alert and no distress  RESP: lungs clear to auscultation - no rales, rhonchi or wheezes  CV: regular rate and rhythm, no murmur, click or rub, no peripheral edema  MS: small tender callosity on left pinky toe (lateral aspect)  PSYCH: mentation appears normal, affect normal/bright      Signed Electronically by: Lyndon Patrick MD    "

## 2025-01-05 ENCOUNTER — HEALTH MAINTENANCE LETTER (OUTPATIENT)
Age: 85
End: 2025-01-05

## 2025-01-06 ENCOUNTER — OFFICE VISIT (OUTPATIENT)
Dept: FAMILY MEDICINE | Facility: CLINIC | Age: 85
End: 2025-01-06
Attending: FAMILY MEDICINE
Payer: MEDICARE

## 2025-01-06 VITALS
WEIGHT: 165.4 LBS | DIASTOLIC BLOOD PRESSURE: 75 MMHG | HEART RATE: 76 BPM | BODY MASS INDEX: 22.4 KG/M2 | HEIGHT: 72 IN | OXYGEN SATURATION: 98 % | RESPIRATION RATE: 19 BRPM | TEMPERATURE: 97.8 F | SYSTOLIC BLOOD PRESSURE: 116 MMHG

## 2025-01-06 DIAGNOSIS — J30.9 ALLERGIC RHINITIS, UNSPECIFIED SEASONALITY, UNSPECIFIED TRIGGER: ICD-10-CM

## 2025-01-06 DIAGNOSIS — J34.89 SINUS DRAINAGE: ICD-10-CM

## 2025-01-06 DIAGNOSIS — Z00.00 ENCOUNTER FOR MEDICARE ANNUAL WELLNESS EXAM: Primary | ICD-10-CM

## 2025-01-06 DIAGNOSIS — R09.81 CONGESTION OF PARANASAL SINUS: ICD-10-CM

## 2025-01-06 DIAGNOSIS — E11.69 TYPE 2 DIABETES MELLITUS WITH OTHER SPECIFIED COMPLICATION, WITHOUT LONG-TERM CURRENT USE OF INSULIN (H): ICD-10-CM

## 2025-01-06 LAB
ALBUMIN SERPL BCG-MCNC: 3.9 G/DL (ref 3.5–5.2)
ALP SERPL-CCNC: 89 U/L (ref 40–150)
ALT SERPL W P-5'-P-CCNC: 15 U/L (ref 0–70)
ANION GAP SERPL CALCULATED.3IONS-SCNC: 9 MMOL/L (ref 7–15)
AST SERPL W P-5'-P-CCNC: 18 U/L (ref 0–45)
BASOPHILS # BLD AUTO: 0 10E3/UL (ref 0–0.2)
BASOPHILS NFR BLD AUTO: 1 %
BILIRUB SERPL-MCNC: 0.5 MG/DL
BUN SERPL-MCNC: 28.6 MG/DL (ref 8–23)
CALCIUM SERPL-MCNC: 9.5 MG/DL (ref 8.8–10.4)
CHLORIDE SERPL-SCNC: 103 MMOL/L (ref 98–107)
CHOLEST SERPL-MCNC: 170 MG/DL
CREAT SERPL-MCNC: 1.08 MG/DL (ref 0.67–1.17)
CREAT UR-MCNC: 156 MG/DL
EGFRCR SERPLBLD CKD-EPI 2021: 67 ML/MIN/1.73M2
EOSINOPHIL # BLD AUTO: 0.2 10E3/UL (ref 0–0.7)
EOSINOPHIL NFR BLD AUTO: 4 %
ERYTHROCYTE [DISTWIDTH] IN BLOOD BY AUTOMATED COUNT: 13 % (ref 10–15)
EST. AVERAGE GLUCOSE BLD GHB EST-MCNC: 171 MG/DL
FASTING STATUS PATIENT QL REPORTED: ABNORMAL
FASTING STATUS PATIENT QL REPORTED: ABNORMAL
GLUCOSE SERPL-MCNC: 204 MG/DL (ref 70–99)
HBA1C MFR BLD: 7.6 % (ref 0–5.6)
HCO3 SERPL-SCNC: 27 MMOL/L (ref 22–29)
HCT VFR BLD AUTO: 42.4 % (ref 40–53)
HDLC SERPL-MCNC: 34 MG/DL
HGB BLD-MCNC: 14.4 G/DL (ref 13.3–17.7)
IMM GRANULOCYTES # BLD: 0 10E3/UL
IMM GRANULOCYTES NFR BLD: 1 %
LDLC SERPL CALC-MCNC: 116 MG/DL
LYMPHOCYTES # BLD AUTO: 1.5 10E3/UL (ref 0.8–5.3)
LYMPHOCYTES NFR BLD AUTO: 25 %
MCH RBC QN AUTO: 28.5 PG (ref 26.5–33)
MCHC RBC AUTO-ENTMCNC: 34 G/DL (ref 31.5–36.5)
MCV RBC AUTO: 84 FL (ref 78–100)
MICROALBUMIN UR-MCNC: <12 MG/L
MICROALBUMIN/CREAT UR: NORMAL MG/G{CREAT}
MONOCYTES # BLD AUTO: 0.5 10E3/UL (ref 0–1.3)
MONOCYTES NFR BLD AUTO: 8 %
NEUTROPHILS # BLD AUTO: 3.7 10E3/UL (ref 1.6–8.3)
NEUTROPHILS NFR BLD AUTO: 62 %
NONHDLC SERPL-MCNC: 136 MG/DL
PLATELET # BLD AUTO: 220 10E3/UL (ref 150–450)
POTASSIUM SERPL-SCNC: 4.6 MMOL/L (ref 3.4–5.3)
PROT SERPL-MCNC: 6.8 G/DL (ref 6.4–8.3)
RBC # BLD AUTO: 5.05 10E6/UL (ref 4.4–5.9)
SODIUM SERPL-SCNC: 139 MMOL/L (ref 135–145)
TRIGL SERPL-MCNC: 100 MG/DL
WBC # BLD AUTO: 6.1 10E3/UL (ref 4–11)

## 2025-01-06 PROCEDURE — 83036 HEMOGLOBIN GLYCOSYLATED A1C: CPT | Performed by: FAMILY MEDICINE

## 2025-01-06 PROCEDURE — G0439 PPPS, SUBSEQ VISIT: HCPCS | Performed by: FAMILY MEDICINE

## 2025-01-06 PROCEDURE — 82570 ASSAY OF URINE CREATININE: CPT | Performed by: FAMILY MEDICINE

## 2025-01-06 PROCEDURE — 36415 COLL VENOUS BLD VENIPUNCTURE: CPT | Performed by: FAMILY MEDICINE

## 2025-01-06 PROCEDURE — 85025 COMPLETE CBC W/AUTO DIFF WBC: CPT | Performed by: FAMILY MEDICINE

## 2025-01-06 PROCEDURE — 99213 OFFICE O/P EST LOW 20 MIN: CPT | Mod: 25 | Performed by: FAMILY MEDICINE

## 2025-01-06 PROCEDURE — 80053 COMPREHEN METABOLIC PANEL: CPT | Performed by: FAMILY MEDICINE

## 2025-01-06 PROCEDURE — 82043 UR ALBUMIN QUANTITATIVE: CPT | Performed by: FAMILY MEDICINE

## 2025-01-06 PROCEDURE — 80061 LIPID PANEL: CPT | Performed by: FAMILY MEDICINE

## 2025-01-06 PROCEDURE — 99207 PR FOOT EXAM NO CHARGE: CPT | Performed by: FAMILY MEDICINE

## 2025-01-06 RX ORDER — IPRATROPIUM BROMIDE 42 UG/1
2 SPRAY, METERED NASAL 3 TIMES DAILY PRN
Qty: 15 ML | Refills: 1 | Status: SHIPPED | OUTPATIENT
Start: 2025-01-06

## 2025-01-06 SDOH — HEALTH STABILITY: PHYSICAL HEALTH: ON AVERAGE, HOW MANY DAYS PER WEEK DO YOU ENGAGE IN MODERATE TO STRENUOUS EXERCISE (LIKE A BRISK WALK)?: 3 DAYS

## 2025-01-06 ASSESSMENT — PAIN SCALES - GENERAL: PAINLEVEL_OUTOF10: NO PAIN (0)

## 2025-01-06 ASSESSMENT — SOCIAL DETERMINANTS OF HEALTH (SDOH): HOW OFTEN DO YOU GET TOGETHER WITH FRIENDS OR RELATIVES?: THREE TIMES A WEEK

## 2025-01-06 NOTE — PROGRESS NOTES
Preventive Care Visit  [facility]  Lyndon Patrick MD, Family Medicine  Jan 6, 2025      Assessment & Plan     Encounter for Medicare annual wellness exam     Defers Flu RSV and Covid shots  - PRIMARY CARE FOLLOW-UP SCHEDULING  - Lipid Profile (Chol, Trig, HDL, LDL calc)  - Comprehensive metabolic panel (BMP + Alb, Alk Phos, ALT, AST, Total. Bili, TP)  - CBC with platelets and differential  - PRIMARY CARE FOLLOW-UP SCHEDULING  - Lipid Profile (Chol, Trig, HDL, LDL calc)  - Comprehensive metabolic panel (BMP + Alb, Alk Phos, ALT, AST, Total. Bili, TP)  - CBC with platelets and differential    Type 2 diabetes mellitus with other specified complication, without long-term current use of insulin (H)    - Hemoglobin A1c  - Lipid Profile (Chol, Trig, HDL, LDL calc)  - Albumin Random Urine Quantitative with Creat Ratio  - Comprehensive metabolic panel (BMP + Alb, Alk Phos, ALT, AST, Total. Bili, TP)  - FOOT EXAM  - FOOT EXAM  - Hemoglobin A1c  - Lipid Profile (Chol, Trig, HDL, LDL calc)  - Albumin Random Urine Quantitative with Creat Ratio  - Comprehensive metabolic panel (BMP + Alb, Alk Phos, ALT, AST, Total. Bili, TP)    Congestion of paranasal sinus  Been on going for a while  - Adult ENT  Referral    Sinus drainage  - Adult ENT  Referral    Allergic rhinitis, unspecified seasonality, unspecified trigger  - ipratropium (ATROVENT) 0.06 % nasal spray  Dispense: 15 mL; Refill: 1  - Adult ENT  Referral      Patient has been advised of split billing requirements and indicates understanding: Yes    Counseling  Appropriate preventive services were addressed with this patient via screening, questionnaire, or discussion as appropriate for fall prevention, nutrition, physical activity, Tobacco-use cessation, social engagement, weight loss and cognition.  Checklist reviewing preventive services available has been given to the patient.  Reviewed patient's diet, addressing  concerns and/or questions.   He is at risk for lack of exercise and has been provided with information to increase physical activity for the benefit of his well-being.       See Patient Instructions    Subjective   Marcos is a 84 year old, presenting for the following:  Physical        1/6/2025     7:29 AM   Additional Questions   Roomed by agatha huang   Accompanied by self         HPI    Wt Readings from Last 4 Encounters:   01/06/25 75 kg (165 lb 6.4 oz)   11/26/24 72.7 kg (160 lb 3.2 oz)   12/18/23 76.8 kg (169 lb 6.4 oz)   12/12/22 72.2 kg (159 lb 3.2 oz)      Diabetes Follow-up    How often are you checking your blood sugar? A few times a month  What time of day are you checking your blood sugars (select all that apply)?  Before meals  Have you had any blood sugars above 200?  No  Have you had any blood sugars below 70?  No  What symptoms do you notice when your blood sugar is low?  None  What concerns do you have today about your diabetes? None   Do you have any of these symptoms? (Select all that apply)  No numbness or tingling in feet.  No redness, sores or blisters on feet.  No complaints of excessive thirst.  No reports of blurry vision.  No significant changes to weight.      BP Readings from Last 2 Encounters:   01/06/25 116/75   11/26/24 120/74     Hemoglobin A1C (%)   Date Value   12/18/2023 7.0 (H)   12/12/2022 6.1 (H)   03/31/2021 6.7 (H)   10/20/2020 6.5 (H)     LDL Cholesterol Calculated (mg/dL)   Date Value   12/18/2023 130 (H)   05/13/2022 107 (H)   10/20/2020 115 (H)   06/16/2020 106 (H)     Functional capacity:    Living arrangement:    Ambulation; normal balance    Vision: due for cataract surgery    Hearing: Great    Bladder control: Normal    Memory: None    Driving: Still drives    ADLS: Need help with:    Chronic Sinus Congestion:    For along time, runs or eats or drinks    Had symptoms for many years    Shots:    Defers Flu RSV and Covid shots    Health Care Directive  Patient does not have a  Health Care Directive: Discussed advance care planning with patient; however, patient declined at this time.      1/6/2025   General Health   How would you rate your overall physical health? Excellent   Feel stress (tense, anxious, or unable to sleep) Not at all         1/6/2025   Nutrition   Diet: Low fat/cholesterol    Breakfast skipped       Multiple values from one day are sorted in reverse-chronological order         1/6/2025   Exercise   Days per week of moderate/strenous exercise 3 days         1/6/2025   Social Factors   Frequency of gathering with friends or relatives Three times a week   Worry food won't last until get money to buy more No   Food not last or not have enough money for food? No   Do you have housing? (Housing is defined as stable permanent housing and does not include staying ouside in a car, in a tent, in an abandoned building, in an overnight shelter, or couch-surfing.) Yes   Are you worried about losing your housing? No   Lack of transportation? No   Unable to get utilities (heat,electricity)? No         1/6/2025   Fall Risk   Fallen 2 or more times in the past year? No    No   Trouble with walking or balance? No    No       Multiple values from one day are sorted in reverse-chronological order          1/6/2025   Activities of Daily Living- Home Safety   Needs help with the following daily activites None of the above   Safety concerns in the home None of the above         1/6/2025   Dental   Dentist two times every year? Yes         1/6/2025   Hearing Screening   Hearing concerns? None of the above         1/6/2025   Driving Risk Screening   Patient/family members have concerns about driving No         1/6/2025   General Alertness/Fatigue Screening   Have you been more tired than usual lately? No         1/6/2025   Urinary Incontinence Screening   Bothered by leaking urine in past 6 months No         1/6/2025   TB Screening   Were you born outside of the [place]? No     Today's PHQ-2 Score:        2025     7:06 AM   PHQ-2 (  [employer])   Q1: Little interest or pleasure in doing things 0   Q2: Feeling down, depressed or hopeless 0   PHQ-2 Score 0    Q1: Little interest or pleasure in doing things Not at all   Q2: Feeling down, depressed or hopeless Not at all   PHQ-2 Score 0       Patient-reported           2025   Substance Use   Alcohol more than 3/day or more than 7/wk No   Do you have a current opioid prescription? No   How severe/bad is pain from 1 to 10? 0/10 (No Pain)   Do you use any other substances recreationally? No     Social History     Tobacco Use    Smoking status: Former     Current packs/day: 0.00     Types: Cigarettes     Quit date: 1971     Years since quittin.8     Passive exposure: Never    Smokeless tobacco: Never   Vaping Use    Vaping status: Never Used   Substance Use Topics    Alcohol use: Yes     Comment: special occasions only    Drug use: No     Fracture Risk Assessment Tool  Link to Frax Calculator  Use the information below to complete the Frax calculator  : 1940  Sex: male  Weight (kg): 75 kg (actual weight)  Height (cm): 183 cm  Previous Fragility Fracture:  No  History of parent with fractured hip:  No  Current Smoking:  No  Patient has been on glucocorticoids for more than 3 months (5mg/day or more): No  Rheumatoid Arthritis on Problem List:  No  Secondary Osteoporosis on Problem List:  No  Consumes 3 or more units of alcohol per day: No  Femoral Neck BMD (g/cm2)    Reviewed and updated as needed this visit by Provider                  Patient Active Problem List   Diagnosis    Hyperlipidemia LDL goal <100    Type 2 diabetes mellitus with other specified complication, without long-term current use of insulin (H)    Type 2 diabetes mellitus without retinopathy (H)    Glaucoma suspect, bilateral - treated    Posterior vitreous detachment, left    Combined form of age-related cataract, mild-mod, both eyes     Past Surgical History:   Procedure  Laterality Date    CYSTOSCOPY, DILATE URETHRA, COMBINED      TONSILLECTOMY      T & A  age 3    VASECTOMY  Age 39       Social History     Tobacco Use    Smoking status: Former     Current packs/day: 0.00     Types: Cigarettes     Quit date: 1971     Years since quittin.8     Passive exposure: Never    Smokeless tobacco: Never   Substance Use Topics    Alcohol use: Yes     Comment: special occasions only     Family History   Problem Relation Age of Onset    Diabetes Mother     Heart Disease Mother     Diabetes Father     Diabetes Brother     Cancer Brother     Cancer Sister     Diabetes Sister     Diabetes Brother     Glaucoma No family hx of     Macular Degeneration No family hx of          Current providers sharing in care for this patient include:  Patient Care Team:  Lyndon Patrick MD as PCP - General (Family Practice)  Lyndon Patrick MD as Assigned PCP  Regis Castillo MD as Assigned Surgical Provider    The following health maintenance items are reviewed in Epic and correct as of today:  Health Maintenance   Topic Date Due    RSV VACCINE (1 - 1-dose 75+ series) Never done    ANNUAL REVIEW OF HM ORDERS  2022    A1C  2024    INFLUENZA VACCINE (1) 2024    COVID-19 Vaccine ( - - season) 2024    BMP  2024    LIPID  2024    MICROALBUMIN  2024    DIABETIC FOOT EXAM  2024    MEDICARE ANNUAL WELLNESS VISIT  2024    EYE EXAM  2025    FALL RISK ASSESSMENT  2026    ADVANCE CARE PLANNING  2028    DTAP/TDAP/TD IMMUNIZATION (2 - Td or Tdap) 2032    PHQ-2 (once per calendar year)  Completed    Pneumococcal Vaccine: 50+ Years  Completed    ZOSTER IMMUNIZATION  Completed    HPV IMMUNIZATION  Aged Out    MENINGITIS IMMUNIZATION  Aged Out    RSV MONOCLONAL ANTIBODY  Aged Out       Review of Systems  Constitutional, neuro, ENT, endocrine, pulmonary, cardiac, gastrointestinal, genitourinary, musculoskeletal,  "integument and psychiatric systems are negative, except as otherwise noted.     Objective    Exam  /75   Pulse 76   Temp 97.8  F (36.6  C) (Temporal)   Resp 19   Ht 1.83 m (6' 0.05\")   Wt 75 kg (165 lb 6.4 oz)   SpO2 98%   BMI 22.40 kg/m     Estimated body mass index is 22.4 kg/m  as calculated from the following:    Height as of this encounter: 1.83 m (6' 0.05\").    Weight as of this encounter: 75 kg (165 lb 6.4 oz).    Physical Exam  GENERAL: alert and no distress  EYES: Eyes grossly normal to inspection, PERRL and conjunctivae and sclerae normal  HENT: ear canals and TM's normal, nose and mouth without ulcers or lesions  NECK: no adenopathy, no asymmetry, masses, or scars  RESP: lungs clear to auscultation - no rales, rhonchi or wheezes  CV: regular rate and rhythm, no murmur, click or rub, no peripheral edema  ABDOMEN: soft, nontender, no hepatosplenomegaly, no masses and bowel sounds normal  MS: no gross musculoskeletal defects noted, no edema  SKIN: no suspicious lesions or rashes  NEURO: Normal strength and tone, mentation intact and speech normal  PSYCH: mentation appears normal, affect normal/bright  Diabetic foot exam: no trophic changes or ulcerative lesions, normal sensory exam, DP reduced bilateral, and PT reduced bilateral        1/6/2025   Mini Cog   Clock Draw Score 2 Normal   3 Item Recall 3 objects recalled   Mini Cog Total Score 5     Results for orders placed or performed in visit on 01/06/25   Hemoglobin A1c     Status: Abnormal   Result Value Ref Range    Estimated Average Glucose 171 (H) <117 mg/dL    Hemoglobin A1C 7.6 (H) 0.0 - 5.6 %   Lipid Profile (Chol, Trig, HDL, LDL calc)     Status: Abnormal   Result Value Ref Range    Cholesterol 170 <200 mg/dL    Triglycerides 100 <150 mg/dL    Direct Measure HDL 34 (L) >=40 mg/dL    LDL Cholesterol Calculated 116 (H) <100 mg/dL    Non HDL Cholesterol 136 (H) <130 mg/dL    Patient Fasting > 8hrs? Unknown     Narrative    " Cholesterol  Desirable: < 200 mg/dL  Borderline High: 200 - 239 mg/dL  High: >= 240 mg/dL    Triglycerides  Normal: < 150 mg/dL  Borderline High: 150 - 199 mg/dL  High: 200-499 mg/dL  Very High: >= 500 mg/dL    Direct Measure HDL  Female: >= 50 mg/dL   Male: >= 40 mg/dL    LDL Cholesterol  Desirable: < 100 mg/dL  Above Desirable: 100 - 129 mg/dL   Borderline High: 130 - 159 mg/dL   High:  160 - 189 mg/dL   Very High: >= 190 mg/dL    Non HDL Cholesterol  Desirable: < 130 mg/dL  Above Desirable: 130 - 159 mg/dL  Borderline High: 160 - 189 mg/dL  High: 190 - 219 mg/dL  Very High: >= 220 mg/dL   Albumin Random Urine Quantitative with Creat Ratio     Status: None   Result Value Ref Range    Creatinine Urine mg/dL 156.0 mg/dL    Albumin Urine mg/L <12.0 mg/L    Albumin Urine mg/g Cr     Comprehensive metabolic panel (BMP + Alb, Alk Phos, ALT, AST, Total. Bili, TP)     Status: Abnormal   Result Value Ref Range    Sodium 139 135 - 145 mmol/L    Potassium 4.6 3.4 - 5.3 mmol/L    Carbon Dioxide (CO2) 27 22 - 29 mmol/L    Anion Gap 9 7 - 15 mmol/L    Urea Nitrogen 28.6 (H) 8.0 - 23.0 mg/dL    Creatinine 1.08 0.67 - 1.17 mg/dL    GFR Estimate 67 >60 mL/min/1.73m2    Calcium 9.5 8.8 - 10.4 mg/dL    Chloride 103 98 - 107 mmol/L    Glucose 204 (H) 70 - 99 mg/dL    Alkaline Phosphatase 89 40 - 150 U/L    AST 18 0 - 45 U/L    ALT 15 0 - 70 U/L    Protein Total 6.8 6.4 - 8.3 g/dL    Albumin 3.9 3.5 - 5.2 g/dL    Bilirubin Total 0.5 <=1.2 mg/dL    Patient Fasting > 8hrs? Unknown    CBC with platelets and differential     Status: None   Result Value Ref Range    WBC Count 6.1 4.0 - 11.0 10e3/uL    RBC Count 5.05 4.40 - 5.90 10e6/uL    Hemoglobin 14.4 13.3 - 17.7 g/dL    Hematocrit 42.4 40.0 - 53.0 %    MCV 84 78 - 100 fL    MCH 28.5 26.5 - 33.0 pg    MCHC 34.0 31.5 - 36.5 g/dL    RDW 13.0 10.0 - 15.0 %    Platelet Count 220 150 - 450 10e3/uL    % Neutrophils 62 %    % Lymphocytes 25 %    % Monocytes 8 %    % Eosinophils 4 %    %  Basophils 1 %    % Immature Granulocytes 1 %    Absolute Neutrophils 3.7 1.6 - 8.3 10e3/uL    Absolute Lymphocytes 1.5 0.8 - 5.3 10e3/uL    Absolute Monocytes 0.5 0.0 - 1.3 10e3/uL    Absolute Eosinophils 0.2 0.0 - 0.7 10e3/uL    Absolute Basophils 0.0 0.0 - 0.2 10e3/uL    Absolute Immature Granulocytes 0.0 <=0.4 10e3/uL   CBC with platelets and differential     Status: None    Narrative    The following orders were created for panel order CBC with platelets and differential.  Procedure                               Abnormality         Status                     ---------                               -----------         ------                     CBC with platelets and d...[[id number]]                      Final result                 Please view results for these tests on the individual orders.        Signed Electronically by: Lyndon Patrick MD

## 2025-01-06 NOTE — PATIENT INSTRUCTIONS
Patient Education   Preventive Care Advice   This is general advice given by our system to help you stay healthy. However, your care team may have specific advice just for you. Please talk to your care team about your preventive care needs.  Nutrition  Eat 5 or more servings of fruits and vegetables each day.  Try wheat bread, brown rice and whole grain pasta (instead of white bread, rice, and pasta).  Get enough calcium and vitamin D. Check the label on foods and aim for 100% of the RDA (recommended daily allowance).  Lifestyle  Exercise at least 150 minutes each week  (30 minutes a day, 5 days a week).  Do muscle strengthening activities 2 days a week. These help control your weight and prevent disease.  No smoking.  Wear sunscreen to prevent skin cancer.  Have a dental exam and cleaning every 6 months.  Yearly exams  See your health care team every year to talk about:  Any changes in your health.  Any medicines your care team has prescribed.  Preventive care, family planning, and ways to prevent chronic diseases.  Shots (vaccines)   HPV shots (up to age 26), if you've never had them before.  Hepatitis B shots (up to age 59), if you've never had them before.  COVID-19 shot: Get this shot when it's due.  Flu shot: Get a flu shot every year.  Tetanus shot: Get a tetanus shot every 10 years.  Pneumococcal, hepatitis A, and RSV shots: Ask your care team if you need these based on your risk.  Shingles shot (for age 50 and up)  General health tests  Diabetes screening:  Starting at age 35, Get screened for diabetes at least every 3 years.  If you are younger than age 35, ask your care team if you should be screened for diabetes.  Cholesterol test: At age 39, start having a cholesterol test every 5 years, or more often if advised.  Bone density scan (DEXA): At age 50, ask your care team if you should have this scan for osteoporosis (brittle bones).  Hepatitis C: Get tested at least once in your life.  STIs (sexually  transmitted infections)  Before age 24: Ask your care team if you should be screened for STIs.  After age 24: Get screened for STIs if you're at risk. You are at risk for STIs (including HIV) if:  You are sexually active with more than one person.  You don't use condoms every time.  You or a partner was diagnosed with a sexually transmitted infection.  If you are at risk for HIV, ask about PrEP medicine to prevent HIV.  Get tested for HIV at least once in your life, whether you are at risk for HIV or not.  Cancer screening tests  Cervical cancer screening: If you have a cervix, begin getting regular cervical cancer screening tests starting at age 21.  Breast cancer scan (mammogram): If you've ever had breasts, begin having regular mammograms starting at age 40. This is a scan to check for breast cancer.  Colon cancer screening: It is important to start screening for colon cancer at age 45.  Have a colonoscopy test every 10 years (or more often if you're at risk) Or, ask your provider about stool tests like a FIT test every year or Cologuard test every 3 years.  To learn more about your testing options, visit:   .  For help making a decision, visit:   https://bit.ly/zw16285.  Prostate cancer screening test: If you have a prostate, ask your care team if a prostate cancer screening test (PSA) at age 55 is right for you.  Lung cancer screening: If you are a current or former smoker ages 50 to 80, ask your care team if ongoing lung cancer screenings are right for you.  For informational purposes only. Not to replace the advice of your health care provider. Copyright   2023 [employer]. All rights reserved. Clinically reviewed by the [facility]. [employer] 557689 - REV 01/24.

## 2025-02-28 PROBLEM — H40.001 GLAUCOMA SUSPECT OF RIGHT EYE: Status: ACTIVE | Noted: 2017-02-04

## 2025-02-28 PROBLEM — H40.1122 PRIMARY OPEN ANGLE GLAUCOMA (POAG) OF LEFT EYE, MODERATE STAGE: Status: ACTIVE | Noted: 2025-02-28

## 2025-03-23 NOTE — PROGRESS NOTES
Assessment & Plan     Advised to take ipratropium BID, follow-up as needed     Problem List Items Addressed This Visit    None  Visit Diagnoses       Vasomotor rhinitis    -  Primary                16 minutes spent on the date of the encounter doing chart review, history and exam, documentation and further activities per the note  {     BRENDA Tillman [facility] [place]    Subjective     HPI-    Recent relevant appt 2017 Dr Ayala:  History of Present Illness - Marcos Downing is a 77 year old male who presents for evaluation of nose symptoms. The patient describes symptoms of clear rhinorrhea, sometimes bloody. Bleeding in the morning, left side. Also has worse runny nose with eating. Chronic problem. Treatments include flonase. Also has a lot of throat clearing.     A/P - Marcos Downing is a 77 year old male with vasomotor rhinitis and epistaxis. The epistaxis maybe from blowing the nose all the time. i'll have him try atrovent and use vaseline at night. Return if this fails.     Saw PRIMARY CARE PROVIDER in January with similar complaints    Today reports decades of chronic rhinitis, ipratropium does help (from PRIMARY CARE PROVIDER,  he doesn't remember seeing Jamie as above) but doesn't use it daily.      Review of Systems   ENT as above      Objective    /77   Pulse 95   Resp 16   SpO2 97%     Physical Exam     Constitutional:   The patient was in no acute distress.      Head/Face:   Normocephalic and atraumatic.  No lesions or scars.     Nose:  Anterior rhinoscopy revealed midline septum and absence of purulence or polyps.       Mouth:  Normal tongue, floor of mouth, buccal mucosa, and palate.  No lesions, ulceration or  masses on inspection, normal voice quality      Oropharynx:  Normal mucosa, palate symmetric with normal elevation. Tonsils  0t     Neck:  Supple with normal laryngeal and tracheal landmarks. No palpable thyroid.     Lymphatic:  There is no palpable  lymphadenopathy in the neck.

## 2025-03-23 NOTE — PATIENT INSTRUCTIONS
Rhinitis: Care Instructions    Overview  Rhinitis is swelling and irritation in the nose. Allergies and infections are often the cause. Your nose may run or feel stuffy. Other symptoms are itchy and sore eyes, ears, throat, and mouth.  If allergies are the cause, your doctor may do tests to find out what you are allergic to. You may be able to stop symptoms if you avoid the things that cause them. Your doctor may suggest or prescribe medicine to ease your symptoms.    Follow-up care is a key part of your treatment and safety. Be sure to make and go to all appointments, and call your doctor if you are having problems. It's also a good idea to know your test results and keep a list of the medicines you take.    How can you care for yourself at home?  If your rhinitis is caused by allergies, try to find out what sets off (triggers) your symptoms. Take steps to avoid your triggers.  Avoid yard work. It can stir up both pollen and mold.  Do not smoke or allow others to smoke around you. If you need help quitting, talk to your doctor about stop-smoking programs and medicines. These can increase your chances of quitting for good.  Do not use aerosol sprays, cleaning products, or perfumes.  If pollen is one of your triggers, close your house and car windows during blooming season.  Clean your house often to control dust.  Keep pets outside.  If your doctor recommends over-the-counter medicines to relieve symptoms, take your medicines exactly as prescribed. Call your doctor if you think you are having a problem with your medicine.  Use saline (saltwater) nasal washes to help keep your nasal passages open and wash out mucus and allergens. You can buy saline nose sprays at a grocery store or drugstore. Or you can make your own at home by adding 1 teaspoon of non-iodized salt and 1 teaspoon of baking soda to 2 cups of distilled or boiled and cooled water. If you make your own, fill a squeeze bottle or neti pot with the  "solution, insert the tip into your nostril, and lean over the sink. Gently squirt the solution with your mouth open and repeat on the other side.    When should you call for help?   Call your doctor now or seek immediate medical care if:    You are having trouble breathing.   Watch closely for changes in your health, and be sure to contact your doctor if:    Mucus from your nose gets thicker (like pus) or has new blood in it.     You have new or worse symptoms.     You do not get better as expected.     Where can you learn more?  Go to https://www.[employer].net/patiented  Enter M030 in the search box to learn more about \"Rhinitis: Care Instructions.\"    Current as of: September 27, 2023               Content Version: 14.0    4760-0385 [employer].   Care instructions adapted under license by your healthcare professional. If you have questions about a medical condition or this instruction, always ask your healthcare professional. [employer] disclaims any warranty or liability for your use of this information.   "

## 2025-03-31 ENCOUNTER — OFFICE VISIT (OUTPATIENT)
Dept: OTOLARYNGOLOGY | Facility: CLINIC | Age: 85
End: 2025-03-31
Payer: MEDICARE

## 2025-03-31 VITALS
OXYGEN SATURATION: 97 % | RESPIRATION RATE: 16 BRPM | DIASTOLIC BLOOD PRESSURE: 77 MMHG | HEART RATE: 95 BPM | SYSTOLIC BLOOD PRESSURE: 128 MMHG

## 2025-03-31 DIAGNOSIS — J30.0 VASOMOTOR RHINITIS: Primary | ICD-10-CM

## 2025-03-31 PROCEDURE — 3078F DIAST BP <80 MM HG: CPT | Performed by: PHYSICIAN ASSISTANT

## 2025-03-31 PROCEDURE — 3074F SYST BP LT 130 MM HG: CPT | Performed by: PHYSICIAN ASSISTANT

## 2025-03-31 PROCEDURE — 99203 OFFICE O/P NEW LOW 30 MIN: CPT | Performed by: PHYSICIAN ASSISTANT

## 2025-03-31 NOTE — LETTER
3/31/2025      Marcos Downing  [address] [facility] [address]      Dear Colleague,    Thank you for referring your patient, Marcos Downing, to the [facility]. Please see a copy of my visit note below.    Assessment & Plan    Advised to take ipratropium BID, follow-up as needed     Problem List Items Addressed This Visit    None  Visit Diagnoses       Vasomotor rhinitis    -  Primary                16 minutes spent on the date of the encounter doing chart review, history and exam, documentation and further activities per the note  {     BRENDA Tillman  [facility]    Subjective     HPI-    Recent relevant appt 2017 Dr Ayala:  History of Present Illness - Marcos Downing is a 77 year old male who presents for evaluation of nose symptoms. The patient describes symptoms of clear rhinorrhea, sometimes bloody. Bleeding in the morning, left side. Also has worse runny nose with eating. Chronic problem. Treatments include flonase. Also has a lot of throat clearing.     A/P - Marcos Downing is a 77 year old male with vasomotor rhinitis and epistaxis. The epistaxis maybe from blowing the nose all the time. i'll have him try atrovent and use vaseline at night. Return if this fails.     Saw PRIMARY CARE PROVIDER in January with similar complaints    Today reports decades of chronic rhinitis, ipratropium does help (from PRIMARY CARE PROVIDER,  he doesn't remember seeing Jamie as above) but doesn't use it daily.      Review of Systems   ENT as above      Objective    /77   Pulse 95   Resp 16   SpO2 97%     Physical Exam     Constitutional:   The patient was in no acute distress.      Head/Face:   Normocephalic and atraumatic.  No lesions or scars.     Nose:  Anterior rhinoscopy revealed midline septum and absence of purulence or polyps.       Mouth:  Normal tongue, floor of mouth, buccal mucosa, and palate.  No lesions, ulceration or  masses  on inspection, normal voice quality      Oropharynx:  Normal mucosa, palate symmetric with normal elevation. Tonsils  0t     Neck:  Supple with normal laryngeal and tracheal landmarks. No palpable thyroid.     Lymphatic:  There is no palpable lymphadenopathy in the neck.                    Again, thank you for allowing me to participate in the care of your patient.        Sincerely,        BRENDA Tillman    Electronically signed

## 2025-04-15 DIAGNOSIS — H40.003 GLAUCOMA SUSPECT, BILATERAL: ICD-10-CM

## 2025-04-15 DIAGNOSIS — J30.9 ALLERGIC RHINITIS, UNSPECIFIED SEASONALITY, UNSPECIFIED TRIGGER: ICD-10-CM

## 2025-04-15 RX ORDER — IPRATROPIUM BROMIDE 42 UG/1
2 SPRAY, METERED NASAL 3 TIMES DAILY PRN
Qty: 15 ML | Refills: 1 | Status: SHIPPED | OUTPATIENT
Start: 2025-04-15

## 2025-04-15 RX ORDER — TIMOLOL MALEATE 5 MG/ML
1 SOLUTION/ DROPS OPHTHALMIC EVERY MORNING
Qty: 10 ML | Refills: 3 | OUTPATIENT
Start: 2025-04-15

## 2025-04-15 RX ORDER — LATANOPROST 50 UG/ML
1 SOLUTION/ DROPS OPHTHALMIC AT BEDTIME
Qty: 7.5 ML | Refills: 3 | OUTPATIENT
Start: 2025-04-15

## 2025-04-18 DIAGNOSIS — H40.003 GLAUCOMA SUSPECT, BILATERAL: ICD-10-CM

## 2025-04-21 RX ORDER — TIMOLOL MALEATE 5 MG/ML
1 SOLUTION/ DROPS OPHTHALMIC EVERY MORNING
Qty: 10 ML | Refills: 3 | Status: SHIPPED | OUTPATIENT
Start: 2025-04-21 | End: 2025-04-22

## 2025-04-21 RX ORDER — LATANOPROST 50 UG/ML
1 SOLUTION/ DROPS OPHTHALMIC AT BEDTIME
Qty: 7.5 ML | Refills: 3 | Status: SHIPPED | OUTPATIENT
Start: 2025-04-21 | End: 2025-04-22

## 2025-04-22 DIAGNOSIS — H40.003 GLAUCOMA SUSPECT, BILATERAL: ICD-10-CM

## 2025-04-22 RX ORDER — TIMOLOL MALEATE 5 MG/ML
1 SOLUTION/ DROPS OPHTHALMIC EVERY MORNING
Qty: 10 ML | Refills: 3 | Status: SHIPPED | OUTPATIENT
Start: 2025-04-22

## 2025-04-22 RX ORDER — LATANOPROST 50 UG/ML
1 SOLUTION/ DROPS OPHTHALMIC AT BEDTIME
Qty: 7.5 ML | Refills: 3 | Status: SHIPPED | OUTPATIENT
Start: 2025-04-22

## 2025-04-30 ENCOUNTER — OFFICE VISIT (OUTPATIENT)
Dept: OPHTHALMOLOGY | Facility: CLINIC | Age: 85
End: 2025-04-30
Attending: OPHTHALMOLOGY
Payer: MEDICARE

## 2025-04-30 ENCOUNTER — DOCUMENTATION ONLY (OUTPATIENT)
Dept: OPHTHALMOLOGY | Facility: CLINIC | Age: 85
End: 2025-04-30

## 2025-04-30 DIAGNOSIS — H40.001 GLAUCOMA SUSPECT, RIGHT: ICD-10-CM

## 2025-04-30 DIAGNOSIS — H40.1123 PRIMARY OPEN ANGLE GLAUCOMA (POAG) OF LEFT EYE, SEVERE STAGE: Primary | ICD-10-CM

## 2025-04-30 DIAGNOSIS — H25.813 COMBINED FORM OF AGE-RELATED CATARACT, BOTH EYES: ICD-10-CM

## 2025-04-30 PROCEDURE — 92025 CPTRIZED CORNEAL TOPOGRAPHY: CPT | Performed by: OPHTHALMOLOGY

## 2025-04-30 PROCEDURE — 92133 CPTRZD OPH DX IMG PST SGM ON: CPT | Performed by: OPHTHALMOLOGY

## 2025-04-30 PROCEDURE — 76519 ECHO EXAM OF EYE: CPT | Performed by: OPHTHALMOLOGY

## 2025-04-30 PROCEDURE — G0463 HOSPITAL OUTPT CLINIC VISIT: HCPCS | Performed by: OPHTHALMOLOGY

## 2025-04-30 ASSESSMENT — EXTERNAL EXAM - RIGHT EYE: OD_EXAM: BROW PTOSIS

## 2025-04-30 ASSESSMENT — TONOMETRY
OS_IOP_MMHG: 13
OD_IOP_MMHG: 12
IOP_METHOD: TONOPEN

## 2025-04-30 ASSESSMENT — REFRACTION_MANIFEST
OS_SPHERE: +1.00
OS_AXIS: 170
OD_AXIS: 160
OS_CYLINDER: +1.75
OD_SPHERE: +0.25
OD_CYLINDER: +1.50
OS_ADD: +3.00
OD_ADD: +3.00

## 2025-04-30 ASSESSMENT — VISUAL ACUITY
METHOD: SNELLEN - LINEAR
OS_BAT_HIGH: <20/400
OD_CC+: -1
CORRECTION_TYPE: GLASSES
METHOD_MR: DX PURPOSES
OS_CC+: -2
OS_CC: 20/40
OD_BAT_HIGH: <20/400
OD_CC: 20/40

## 2025-04-30 ASSESSMENT — CUP TO DISC RATIO
OD_RATIO: 0.6
OS_RATIO: 0.7

## 2025-04-30 ASSESSMENT — PACHYMETRY
OS_CT(UM): 578
OD_CT(UM): 598

## 2025-04-30 ASSESSMENT — EXTERNAL EXAM - LEFT EYE: OS_EXAM: BROW PTOSIS

## 2025-04-30 ASSESSMENT — REFRACTION_WEARINGRX
OD_SPHERE: +1.25
OS_SPHERE: +1.50
OD_CYLINDER: +1.00
SPECS_TYPE: DISTANCE ONLY
OD_AXIS: 158
OS_AXIS: 005
OS_CYLINDER: +1.25

## 2025-04-30 ASSESSMENT — CONF VISUAL FIELD
OS_INFERIOR_NASAL_RESTRICTION: 0
OD_INFERIOR_NASAL_RESTRICTION: 0
OS_NORMAL: 1
OD_SUPERIOR_TEMPORAL_RESTRICTION: 0
METHOD: COUNTING FINGERS
OS_INFERIOR_TEMPORAL_RESTRICTION: 0
OD_INFERIOR_TEMPORAL_RESTRICTION: 0
OD_SUPERIOR_NASAL_RESTRICTION: 0
OS_SUPERIOR_NASAL_RESTRICTION: 0
OS_SUPERIOR_TEMPORAL_RESTRICTION: 0
OD_NORMAL: 1

## 2025-04-30 NOTE — PROGRESS NOTES
Spoke with patient in clinic to schedule surgery with Dr. Parnell    Spoke with: Marcos (patient)    Date(s) of Surgery: 5/19/25    Patient aware of approximate arrival time: Yes at 1200     Tissue: Not Applicable Ordered: Not Applicable     Location of surgery: [facility]     Pre-Op H&P: [facility] at [facility]     Informed patient that they need to call to schedule pre-op H&P within 30 days of surgery date: Yes    Post-Op Appt Dates: 5/20 at 1430 and 6/10 at 1430     Discussed with patient pre-op RN will call 2-3 days prior to surgery with arrival time and instructions:  Yes       Standard Surgery Packet Sent: Yes 04/30/25  via Received in clinic by writer       Additional Information Sent in Packet:        Informed patient that they will need an adult  to bring patient home from surgery: Yes  : daughter          Additional Comments:        All patients questions were answered and was instructed to review surgical packet and call back [phone] with any questions or concerns.       Rocio Jones on 4/30/2025 at 3:26 PM

## 2025-04-30 NOTE — PROGRESS NOTES
HPI       Cataract Evaluation    In both eyes.  Associated symptoms include blurred vision, glare and a need for brighter lights.  Since onset it is gradually worsening.             Comments    Here for cataracts evaluation. VA has been gradually decreasing. Some difficulty with glare. Night driving is okay. Stable floaters right eye without flashes. No pain.    ABDULLAHI Kamara 1:31 PM April 30, 2025             Last edited by Haim Vidales COMT on 4/30/2025  1:32 PM.          Review of systems for the eyes was negative other than the pertinent positives/negatives listed in the HPI.      Assessment & Plan      Marcos Downing is a 85 year old male with the following diagnoses:   1. Primary open angle glaucoma (POAG) of left eye, severe stage    2. Glaucoma suspect, right    3. Combined form of age-related cataract, mod, both eyes         Referral from Dr. Castillo for cataract eval and glaucoma  Diagnosed 2020  Using latanoprost both eyes and timolol left eye currently   Maximum intraocular pressure 21/29 in our system  No previous eye surgery or laser    Cataract, both eyes  Visually significant  Risks, benefits and alternatives to cataract extraction/IOL implantation discussed; consent obtained.  Will schedule surgery today     Special equipment/needs:    Anesthesia:Topical  Dilation:Good  Iris expansion:IFIS  Pseudoexfoliation: No pseudoexfoliation  Trypan Blue: No  Visually significant astigmatism   Discussed surgical corrective options  Reviewed elective nature of surgical correction and patient responsible fee associated  The patient wishes to proceed with toric Intraocular lens ([facility] service connected)   Goal emmetropia   OMNI 360/180  Goal intraocular pressure low teens left eye; mid-high teens right eye   POD1  To consider right eye cataract extraction + iStent pending left eye outcome  Continue current drops for now             Attending Physician Attestation:  Complete documentation of historical and exam  elements from today's encounter can be found in the full encounter summary report (not reduplicated in this progress note).  I personally obtained the chief complaint(s) and history of present illness.  I confirmed and edited as necessary the review of systems, past medical/surgical history, family history, social history, and examination findings as documented by others; and I examined the patient myself.  I personally reviewed the relevant tests, images, and reports as documented above.  I formulated and edited as necessary the assessment and plan and discussed the findings and management plan with the patient and family. . Today with Marcos Downing, I reviewed the indications, risks, benefits, and alternatives of the proposed surgical procedure including, but not limited to, failure obtain the desired result  and need for additional surgery, bleeding, infection, loss of vision, loss of the eye, and the remote possibility of permanent damage to any organ system or death with the use of anesthesia.  I provided multiple opportunities for the questions, answered all questions to the best of my ability, and confirmed that my answers and my discussion were understood.    - Thompson Parnell MD

## 2025-05-05 ENCOUNTER — OFFICE VISIT (OUTPATIENT)
Dept: FAMILY MEDICINE | Facility: CLINIC | Age: 85
End: 2025-05-05
Payer: MEDICARE

## 2025-05-05 VITALS
TEMPERATURE: 98.7 F | DIASTOLIC BLOOD PRESSURE: 74 MMHG | BODY MASS INDEX: 23.8 KG/M2 | HEART RATE: 66 BPM | SYSTOLIC BLOOD PRESSURE: 142 MMHG | RESPIRATION RATE: 17 BRPM | HEIGHT: 71 IN | OXYGEN SATURATION: 99 % | WEIGHT: 170 LBS

## 2025-05-05 DIAGNOSIS — Z01.818 PREOP GENERAL PHYSICAL EXAM: Primary | ICD-10-CM

## 2025-05-05 DIAGNOSIS — H25.89 OTHER AGE-RELATED CATARACT, UNSPECIFIED LATERALITY: ICD-10-CM

## 2025-05-05 DIAGNOSIS — E11.69 TYPE 2 DIABETES MELLITUS WITH OTHER SPECIFIED COMPLICATION, WITHOUT LONG-TERM CURRENT USE OF INSULIN (H): ICD-10-CM

## 2025-05-05 PROCEDURE — 3078F DIAST BP <80 MM HG: CPT | Performed by: FAMILY MEDICINE

## 2025-05-05 PROCEDURE — 99213 OFFICE O/P EST LOW 20 MIN: CPT | Performed by: FAMILY MEDICINE

## 2025-05-05 PROCEDURE — 3077F SYST BP >= 140 MM HG: CPT | Performed by: FAMILY MEDICINE

## 2025-05-05 NOTE — PROGRESS NOTES
Preoperative Evaluation  [facility]  [address]  [place] [address]  Phone: [phone]  Primary Provider: Lyndon Patrick MD  Pre-op Performing Provider: Lyndon Patrick MD  May 5, 2025         5/5/2025   Surgical Information   What procedure is being done? Eye Misha   Facility or Hospital where procedure/surgery will be performed: [facility]   Who is doing the procedure / surgery? Dr Waterman   Date of surgery / procedure: May 19   Time of surgery / procedure: 130   Where do you plan to recover after surgery? at home with family     Fax number for surgical facility: Note does not need to be faxed, will be available electronically in Epic.    Assessment & Plan     The proposed surgical procedure is considered LOW risk.    Preop general physical exam    Perioperative medication management discussed    Other age-related cataract, unspecified laterality    Planned for cataract surgery    Type 2 diabetes mellitus with other specified complication, without long-term current use of insulin (H)    - well controlled on Metformin       - No identified additional risk factors other than previously addressed    Antiplatelet or Anticoagulation Medication Instructions   - aspirin: Bleeding risk is low for this procedure and daily aspirin may be continued without modification.     Additional Medication Instructions  Take all scheduled medications on the day of surgery EXCEPT for modifications listed below:   - metformin: DO NOT TAKE day of surgery.    Recommendation  Approval given to proceed with proposed procedure, without further diagnostic evaluation.    Follow-up       Murali Rodríguez is a 85 year old, presenting for the following:  Pre-Op Exam    Itching: (generalized) stopped Metformin and itching appears to have subsided, will restart and see if itching returns.        5/5/2025    10:44 AM   Additional Questions   Roomed by Mariangel   Accompanied by self          5/5/2025    10:44 AM   Patient Reported Additional Medications   Patient reports taking the following new medications cinnamon,urinary tract total health,longevity     HPI: planned for cataract surgery          5/5/2025   Pre-Op Questionnaire   Have you ever had a heart attack or stroke? No   Have you ever had surgery on your heart or blood vessels, such as a stent placement, a coronary artery bypass, or surgery on an artery in your head, neck, heart, or legs? No   Do you have chest pain with activity? No   Do you have a history of heart failure? No   Do you currently have a cold, bronchitis or symptoms of other infection? No   Do you have a cough, shortness of breath, or wheezing? No   Do you or anyone in your family have previous history of blood clots? No   Do you or does anyone in your family have a serious bleeding problem such as prolonged bleeding following surgeries or cuts? No   Have you ever had problems with anemia or been told to take iron pills? No   Have you had any abnormal blood loss such as black, tarry or bloody stools? No   Have you ever had a blood transfusion? No   Are you willing to have a blood transfusion if it is medically needed before, during, or after your surgery? Yes   Have you or any of your relatives ever had problems with anesthesia? No   Do you have sleep apnea, excessive snoring or daytime drowsiness? No   Do you have any artifical heart valves or other implanted medical devices like a pacemaker, defibrillator, or continuous glucose monitor? No   Do you have artificial joints? No   Are you allergic to latex? No     Advance Care Planning    Discussed advance care planning with patient; informed AVS has link to [facility] [employer].    Preoperative Review of    reviewed - no record of controlled substances prescribed.      Status of Chronic Conditions:  DIABETES - Patient has a longstanding history of DiabetesType Type II . Patient is being treated with oral agents and denies  significant side effects. Control has been good. Complicating factors include but are not limited to: None.     Patient Active Problem List    Diagnosis Date Noted    Primary open angle glaucoma (POAG) of left eye, moderate stage 02/28/2025     Priority: Medium    Combined form of age-related cataract, mod, both eyes 07/04/2019     Priority: Medium    Glaucoma suspect of right eye 02/04/2017     Priority: Medium    Posterior vitreous detachment, left 02/04/2017     Priority: Medium    Type 2 diabetes mellitus without retinopathy (H) 01/06/2017     Priority: Medium    Hyperlipidemia LDL goal <100      Priority: Medium     low HDL      Type 2 diabetes mellitus with other specified complication, without long-term current use of insulin (H)      Priority: Medium      Past Medical History:   Diagnosis Date    Cataract 1/19/2012    DM type 2, goal A1c below 7     Glaucoma (increased eye pressure)     Hyperlipidemia with target LDL less than 100     low HDL    Kidney stone     Pre-diabetes     A1C  6.4 in 8/2011     Past Surgical History:   Procedure Laterality Date    CYSTOSCOPY, DILATE URETHRA, COMBINED  1996    TONSILLECTOMY      T & A  age 3    VASECTOMY  Age 39     Current Outpatient Medications   Medication Sig Dispense Refill    ACE/ARB/ARNI NOT PRESCRIBED, INTENTIONAL, Please choose reason not prescribed, below      blood glucose (ACCU-CHEK SMARTVIEW) test strip 1 strip by In Vitro route daily 100 strip 0    blood glucose (NO BRAND SPECIFIED) lancets standard Test once daily 1 Box 1    blood glucose monitoring (NO BRAND SPECIFIED) test strip Test once daily 100 strip 1    blood glucose monitoring (SOFTCLIX) lancets USE 1 DAILY 1 Box 20    ipratropium (ATROVENT) 0.06 % nasal spray SPRAY 2 SPRAYS INTO BOTH NOSTRILS 3 TIMES DAILY AS NEEDED FOR RHINITIS. 15 mL 1    latanoprost (XALATAN) 0.005 % ophthalmic solution Place 1 drop into both eyes at bedtime. 7.5 mL 3    Multiple Vitamin (DAILY VITAMINS PO) daily       "timolol maleate (TIMOPTIC) 0.5 % ophthalmic solution Place 1 drop Into the left eye every morning. 10 mL 3    aspirin 325 MG EC tablet Take 325 mg by mouth daily. (Patient not taking: Reported on 2025)      fexofenadine (ALLEGRA) 180 MG tablet Take 1 tablet by mouth daily As needed (Patient not taking: Reported on 2025)      metFORMIN (GLUCOPHAGE XR) 500 MG 24 hr tablet TAKE TWO TABLETS BY MOUTH ONCE DAILY WITH DINNER (Patient not taking: Reported on 2025) 180 tablet 0    Nutritional Supplements (ANTIOXIDANT WITH CO Q-10 PO) Take 1 tablet by mouth daily (Patient not taking: Reported on 2025)         Allergies   Allergen Reactions    Niaspan [Niacin]      Flushing.    Brimonidine Rash     Follicular conjunctivitis from Brimonidine eye drops left eye.        Social History     Tobacco Use    Smoking status: Former     Current packs/day: 0.00     Types: Cigarettes     Quit date: 1971     Years since quittin.1     Passive exposure: Never    Smokeless tobacco: Never   Substance Use Topics    Alcohol use: Yes     Comment: special occasions only     Family History   Problem Relation Age of Onset    Diabetes Mother     Heart Disease Mother     Diabetes Father     Diabetes Brother     Cancer Brother     Cancer Sister     Diabetes Sister     Diabetes Brother     Glaucoma No family hx of     Macular Degeneration No family hx of      History   Drug Use No         Review of Systems  Constitutional, neuro, ENT, endocrine, pulmonary, cardiac, gastrointestinal, genitourinary, musculoskeletal, integument and psychiatric systems are negative, except as otherwise noted.    Objective    Pulse 66   Temp 98.7  F (37.1  C) (Temporal)   Resp 17   Ht 1.81 m (' \")   Wt 77.1 kg (170 lb)   SpO2 99%   BMI 23.54 kg/m     Estimated body mass index is 23.54 kg/m  as calculated from the following:    Height as of this encounter: 1.81 m (5' .\").    Weight as of this encounter: 77.1 kg (170 lb).  Physical " Exam  GENERAL: alert and no distress  EYES: Eyes grossly normal to inspection, PERRL and conjunctivae and sclerae normal  HENT: ear canals and TM's normal, nose and mouth without ulcers or lesions  NECK: no adenopathy, no asymmetry, masses, or scars  RESP: lungs clear to auscultation - no rales, rhonchi or wheezes  CV: regular rate and rhythm, no murmur, click or rub, no peripheral edema  ABDOMEN: soft, nontender, no hepatosplenomegaly, no masses and bowel sounds normal  MS: no gross musculoskeletal defects noted, no edema  SKIN: no suspicious lesions or rashes  NEURO: Normal strength and tone, mentation intact and speech normal  PSYCH: mentation appears normal, affect normal/bright    Recent Labs   Lab Test 01/06/25  0809   HGB 14.4         POTASSIUM 4.6   CR 1.08   A1C 7.6*        Diagnostics  No labs were ordered during this visit.   No EKG required for low risk surgery (cataract, skin procedure, breast biopsy, etc).    Revised Cardiac Risk Index (RCRI)  The patient has the following serious cardiovascular risks for perioperative complications:   - No serious cardiac risks = 0 points     RCRI Interpretation: 0 points: Class I (very low risk - 0.4% complication rate)       Signed Electronically by: Lyndon Patrick MD  A copy of this evaluation report is provided to the requesting physician.

## 2025-05-05 NOTE — PATIENT INSTRUCTIONS
How to Take Your Medication Before Surgery  Preoperative Medication Instructions        - No identified additional risk factors other than previously addressed    Antiplatelet or Anticoagulation Medication Instructions   - aspirin: Bleeding risk is low for this procedure and daily aspirin may be continued without modification.     Additional Medication Instructions  Take all scheduled medications on the day of surgery EXCEPT for modifications listed below:   - metformin: DO NOT TAKE day of surgery.    Recommendation  Approval given to proceed with proposed procedure, without further diagnostic evaluation.      Patient Education   Preparing for Your Surgery  For Adults  Getting started  In most cases, a nurse will call to review your health history and instructions. They will give you an arrival time based on your scheduled surgery time. Please be ready to share:  Your doctor's clinic name and phone number  Your medical, surgical, and anesthesia history  A list of allergies and sensitivities  A list of medicines, including herbal treatments and over-the-counter drugs  Whether the patient has a legal guardian (ask how to send us the papers in advance)  Note: You may not receive a call if you were seen at our PAC (Preoperative Assessment Center).  Please tell us if you're pregnant--or if there's any chance you might be pregnant. Some surgeries may injure a fetus (unborn baby), so they require a pregnancy test. Surgeries that are safe for a fetus don't always need a test, and you can choose whether to have one.   Preparing for surgery  Within 10 to 30 days of surgery: Have a pre-op exam (sometimes called an H&P, or History and Physical). This can be done at a clinic or pre-operative center.  If you're having a , you may not need this exam. Talk to your care team.  At your pre-op exam, talk to your care team about all medicines you take. (This includes CBD oil and any drugs, such as THC, marijuana, and other  forms of cannabis.) If you need to stop any medicine before surgery, ask when to start taking it again.  This is for your safety. Many medicines and drugs can make you bleed too much during surgery. Some change how well surgery (anesthesia) drugs work.  Call your insurance company to let them know you're having surgery. (If you don't have insurance, call [phone].)  Call your clinic if there's any change in your health. This includes a scrape or scratch near the surgery site, or any signs of a cold (sore throat, runny nose, cough, rash, fever).  Eating and drinking guidelines  For your safety: Unless your surgeon tells you otherwise, follow the guidelines below.  Eat and drink as normal until 8 hours before you arrive for surgery. After that, no food or milk. You can spit out gum when you arrive.  Drink clear liquids until 2 hours before you arrive. These are liquids you can see through, like water, Gatorade, and Propel Water. They also include plain black coffee and tea (no cream or milk).  No alcohol for 24 hours before you arrive. The night before surgery, stop any drinks that contain THC.  If your care team tells you to take medicine on the morning of surgery, it's okay to take it with a sip of water. No other medicines or drugs are allowed (including CBD oil)--follow your care team's instructions.  If you have questions the day of surgery, call your hospital or surgery center.   Preventing infection  Shower or bathe the night before and the morning of surgery. Follow the instructions your clinic gave you. (If no instructions, use regular soap.)  Don't shave or clip hair near your surgery site. We'll remove the hair if needed.  Don't smoke or vape the morning of surgery. No chewing tobacco for 6 hours before you arrive. A nicotine patch is okay. You may spit out nicotine gum when you arrive.  For some surgeries, the surgeon will tell you to fully quit smoking and nicotine.  We will make every effort to keep  you safe from infection. We will:  Clean our hands often with soap and water (or an alcohol-based hand rub).  Clean the skin at your surgery site with a special soap that kills germs.  Give you a special gown to keep you warm. (Cold raises the risk of infection.)  Wear hair covers, masks, gowns, and gloves during surgery.  Give antibiotic medicine, if prescribed. Not all surgeries need this medicine.  What to bring on the day of surgery  Photo ID and insurance card  Copy of your health care directive, if you have one  Glasses and hearing aids (bring cases)  You can't wear contacts during surgery  Inhaler and eye drops, if you use them (tell us about these when you arrive)  CPAP machine or breathing device, if you use them  A few personal items, if spending the night  If you have . . .  A pacemaker, ICD (cardiac defibrillator), or other implant: Bring the ID card.  An implanted stimulator: Bring the remote control.  A legal guardian: Bring a copy of the certified (court-stamped) guardianship papers.  Please remove any jewelry, including body piercings. Leave jewelry and other valuables at home.  If you're going home the day of surgery  You must have a responsible adult drive you home. They should stay with you overnight as well.  If you don't have someone to stay with you, and you aren't safe to go home alone, we may keep you overnight. Insurance often won't pay for this.  After surgery  If it's hard to control your pain or you need more pain medicine, please call your surgeon's office.  Questions?   If you have any questions for your care team, list them here:   ____________________________________________________________________________________________________________________________________________________________________________________________________________________________________________________________  For informational purposes only. Not to replace the advice of your health care provider. Copyright   2003,  2019 [facility]. All rights reserved. Clinically reviewed by Reinaldo Faith MD. Roojoom [id number] - REV 08/24.

## 2025-05-14 ENCOUNTER — ANESTHESIA EVENT (OUTPATIENT)
Dept: SURGERY | Facility: AMBULATORY SURGERY CENTER | Age: 85
End: 2025-05-14
Payer: MEDICARE

## 2025-05-17 NOTE — ANESTHESIA PREPROCEDURE EVALUATION
Anesthesia Pre-Procedure Evaluation    Patient: Marcos Downing   MRN: [id number] : 1940          Procedure : Procedure(s):  LEFT EYE PHACOEMULSIFICATION, CATARACT, WITH INTRAOCULAR LENS IMPLANT, TORIC LENS  MICROCATHETERIZATION, VISCODILATION OF SCHLEMM'S CANAL         Past Medical History:   Diagnosis Date    Cataract 2012    DM type 2, goal A1c below 7     Glaucoma (increased eye pressure)     Hyperlipidemia with target LDL less than 100     low HDL    Kidney stone     Pre-diabetes     A1C  6.4 in 2011      Past Surgical History:   Procedure Laterality Date    CYSTOSCOPY, DILATE URETHRA, COMBINED      TONSILLECTOMY      T & A  age 3    VASECTOMY  Age 39      Allergies   Allergen Reactions    Niaspan [Niacin]      Flushing.    Brimonidine Rash     Follicular conjunctivitis from Brimonidine eye drops left eye.      Social History     Tobacco Use    Smoking status: Former     Current packs/day: 0.00     Types: Cigarettes     Quit date: 1971     Years since quittin.1     Passive exposure: Never    Smokeless tobacco: Never   Substance Use Topics    Alcohol use: Yes     Comment: special occasions only      Wt Readings from Last 1 Encounters:   25 77.1 kg (170 lb)        Anesthesia Evaluation            ROS/MED HX  ENT/Pulmonary:  - neg pulmonary ROS     Neurologic:  - neg neurologic ROS     Cardiovascular:     (+) Dyslipidemia - -   -  - -                                      METS/Exercise Tolerance:     Hematologic:  - neg hematologic  ROS     Musculoskeletal:  - neg musculoskeletal ROS     GI/Hepatic:  - neg GI/hepatic ROS     Renal/Genitourinary:  - neg Renal ROS     Endo:     (+)  type II DM,   Not using insulin,                 Psychiatric/Substance Use:  - neg psychiatric ROS     Infectious Disease:  - neg infectious disease ROS     Malignancy:       Other:              Physical Exam  Airway  Mallampati: II  TM distance: >3 FB  Neck ROM: full  Mouth opening: >= 4  "cm    Cardiovascular   Rhythm: regular  Rate: tachycardia     Dental   (+) Multiple crowns, permanent bridges      Pulmonary - normal examBreath sounds clear to auscultation        Neurological   Other Findings       OUTSIDE LABS:  CBC:   Lab Results   Component Value Date    WBC 6.1 01/06/2025    WBC 7.0 04/06/2011    HGB 14.4 01/06/2025    HGB 15.8 04/06/2011    HCT 42.4 01/06/2025    HCT 46.8 04/06/2011     01/06/2025     04/06/2011     BMP:   Lab Results   Component Value Date     01/06/2025     12/18/2023    POTASSIUM 4.6 01/06/2025    POTASSIUM 4.2 12/18/2023    CHLORIDE 103 01/06/2025    CHLORIDE 105 12/18/2023    CO2 27 01/06/2025    CO2 25 12/18/2023    BUN 28.6 (H) 01/06/2025    BUN 24.8 (H) 12/18/2023    CR 1.08 01/06/2025    CR 0.97 12/18/2023     (H) 01/06/2025     (H) 12/18/2023     COAGS: No results found for: \"PTT\", \"INR\", \"FIBR\"  POC: No results found for: \"BGM\", \"HCG\", \"HCGS\"  HEPATIC:   Lab Results   Component Value Date    ALBUMIN 3.9 01/06/2025    PROTTOTAL 6.8 01/06/2025    ALT 15 01/06/2025    AST 18 01/06/2025    ALKPHOS 89 01/06/2025    BILITOTAL 0.5 01/06/2025     OTHER:   Lab Results   Component Value Date    A1C 7.6 (H) 01/06/2025    GIL 9.5 01/06/2025    LIPASE 97 12/12/2022    TSH 1.58 07/02/2018       Anesthesia Plan    ASA Status:  2      NPO Status: NPO Appropriate   Anesthesia Type: MAC.  Induction: intravenous.  Maintenance: TIVA.   Techniques and Equipment:       - Monitoring Plan: standard ASA monitoring     Consents    Anesthesia Plan(s) and associated risks, benefits, and realistic alternatives discussed. Questions answered and patient/representative(s) expressed understanding.     - Discussed: anesthesiologist     - Discussed with:  Patient               Postoperative Care    Pain management: plan for postoperative opioid use, multimodal analgesia.     Comments:                   Florentino Coelho MD    I have reviewed the pertinent notes " and labs in the chart from the past 30 days and (re)examined the patient.  Any updates or changes from those notes are reflected in this note.    Clinically Significant Risk Factors Present on Admission                            # DMII: A1C = N/A within past 6 months

## 2025-05-19 ENCOUNTER — TELEPHONE (OUTPATIENT)
Dept: OPHTHALMOLOGY | Facility: CLINIC | Age: 85
End: 2025-05-19

## 2025-05-19 ENCOUNTER — ANESTHESIA (OUTPATIENT)
Dept: SURGERY | Facility: AMBULATORY SURGERY CENTER | Age: 85
End: 2025-05-19
Payer: MEDICARE

## 2025-05-19 ENCOUNTER — OFFICE VISIT (OUTPATIENT)
Dept: OPHTHALMOLOGY | Facility: CLINIC | Age: 85
End: 2025-05-19

## 2025-05-19 ENCOUNTER — HOSPITAL ENCOUNTER (OUTPATIENT)
Facility: AMBULATORY SURGERY CENTER | Age: 85
Discharge: HOME | End: 2025-05-19
Attending: OPHTHALMOLOGY
Payer: MEDICARE

## 2025-05-19 VITALS
SYSTOLIC BLOOD PRESSURE: 162 MMHG | RESPIRATION RATE: 18 BRPM | BODY MASS INDEX: 23.8 KG/M2 | TEMPERATURE: 97.6 F | WEIGHT: 169.97 LBS | OXYGEN SATURATION: 98 % | HEIGHT: 71 IN | DIASTOLIC BLOOD PRESSURE: 64 MMHG | HEART RATE: 70 BPM

## 2025-05-19 DIAGNOSIS — H25.813 COMBINED FORM OF AGE-RELATED CATARACT, BOTH EYES: Primary | ICD-10-CM

## 2025-05-19 DIAGNOSIS — H40.1111 PRIMARY OPEN ANGLE GLAUCOMA (POAG) OF RIGHT EYE, MILD STAGE: ICD-10-CM

## 2025-05-19 DIAGNOSIS — H40.1122 PRIMARY OPEN ANGLE GLAUCOMA (POAG) OF LEFT EYE, MODERATE STAGE: ICD-10-CM

## 2025-05-19 DIAGNOSIS — H25.811 COMBINED FORM OF AGE-RELATED CATARACT, RIGHT EYE: Primary | ICD-10-CM

## 2025-05-19 DIAGNOSIS — Z96.1 PSEUDOPHAKIA: Primary | ICD-10-CM

## 2025-05-19 LAB — GLUCOSE BLDC GLUCOMTR-MCNC: 219 MG/DL (ref 70–99)

## 2025-05-19 PROCEDURE — 82962 GLUCOSE BLOOD TEST: CPT | Performed by: PATHOLOGY

## 2025-05-19 DEVICE — IMPLANTABLE DEVICE: Type: IMPLANTABLE DEVICE | Site: EYE | Status: FUNCTIONAL

## 2025-05-19 RX ORDER — PROPARACAINE HYDROCHLORIDE 5 MG/ML
1 SOLUTION/ DROPS OPHTHALMIC ONCE
Status: COMPLETED | OUTPATIENT
Start: 2025-05-19 | End: 2025-05-19

## 2025-05-19 RX ORDER — LIDOCAINE HYDROCHLORIDE 10 MG/ML
INJECTION, SOLUTION EPIDURAL; INFILTRATION; INTRACAUDAL; PERINEURAL PRN
Status: DISCONTINUED | OUTPATIENT
Start: 2025-05-19 | End: 2025-05-19 | Stop reason: HOSPADM

## 2025-05-19 RX ORDER — HYDROMORPHONE HYDROCHLORIDE 1 MG/ML
0.2 INJECTION, SOLUTION INTRAMUSCULAR; INTRAVENOUS; SUBCUTANEOUS EVERY 5 MIN PRN
Status: DISCONTINUED | OUTPATIENT
Start: 2025-05-19 | End: 2025-05-20 | Stop reason: HOSPADM

## 2025-05-19 RX ORDER — PREDNISOLONE ACETATE 10 MG/ML
1 SUSPENSION/ DROPS OPHTHALMIC 4 TIMES DAILY
Qty: 10 ML | Refills: 1 | Status: SHIPPED | OUTPATIENT
Start: 2025-05-19

## 2025-05-19 RX ORDER — MOXIFLOXACIN IN NACL,ISO-OS/PF 0.3MG/0.3
SYRINGE (ML) INTRAOCULAR PRN
Status: DISCONTINUED | OUTPATIENT
Start: 2025-05-19 | End: 2025-05-19 | Stop reason: HOSPADM

## 2025-05-19 RX ORDER — FENTANYL CITRATE 50 UG/ML
50 INJECTION, SOLUTION INTRAMUSCULAR; INTRAVENOUS EVERY 5 MIN PRN
Status: DISCONTINUED | OUTPATIENT
Start: 2025-05-19 | End: 2025-05-20 | Stop reason: HOSPADM

## 2025-05-19 RX ORDER — LIDOCAINE 40 MG/G
CREAM TOPICAL
Status: DISCONTINUED | OUTPATIENT
Start: 2025-05-19 | End: 2025-05-20 | Stop reason: HOSPADM

## 2025-05-19 RX ORDER — SODIUM CHLORIDE, SODIUM LACTATE, POTASSIUM CHLORIDE, CALCIUM CHLORIDE 600; 310; 30; 20 MG/100ML; MG/100ML; MG/100ML; MG/100ML
INJECTION, SOLUTION INTRAVENOUS CONTINUOUS
Status: DISCONTINUED | OUTPATIENT
Start: 2025-05-19 | End: 2025-05-20 | Stop reason: HOSPADM

## 2025-05-19 RX ORDER — HYDROMORPHONE HYDROCHLORIDE 1 MG/ML
0.4 INJECTION, SOLUTION INTRAMUSCULAR; INTRAVENOUS; SUBCUTANEOUS EVERY 5 MIN PRN
Status: DISCONTINUED | OUTPATIENT
Start: 2025-05-19 | End: 2025-05-20 | Stop reason: HOSPADM

## 2025-05-19 RX ORDER — DEXAMETHASONE SODIUM PHOSPHATE 10 MG/ML
4 INJECTION, SOLUTION INTRAMUSCULAR; INTRAVENOUS
Status: DISCONTINUED | OUTPATIENT
Start: 2025-05-19 | End: 2025-05-20 | Stop reason: HOSPADM

## 2025-05-19 RX ORDER — TETRACAINE HYDROCHLORIDE 5 MG/ML
SOLUTION OPHTHALMIC PRN
Status: DISCONTINUED | OUTPATIENT
Start: 2025-05-19 | End: 2025-05-19 | Stop reason: HOSPADM

## 2025-05-19 RX ORDER — ONDANSETRON 4 MG/1
4 TABLET, ORALLY DISINTEGRATING ORAL EVERY 30 MIN PRN
Status: DISCONTINUED | OUTPATIENT
Start: 2025-05-19 | End: 2025-05-20 | Stop reason: HOSPADM

## 2025-05-19 RX ORDER — OXYCODONE HYDROCHLORIDE 5 MG/1
5 TABLET ORAL
Status: DISCONTINUED | OUTPATIENT
Start: 2025-05-19 | End: 2025-05-20 | Stop reason: HOSPADM

## 2025-05-19 RX ORDER — NALOXONE HYDROCHLORIDE 0.4 MG/ML
0.1 INJECTION, SOLUTION INTRAMUSCULAR; INTRAVENOUS; SUBCUTANEOUS
Status: DISCONTINUED | OUTPATIENT
Start: 2025-05-19 | End: 2025-05-20 | Stop reason: HOSPADM

## 2025-05-19 RX ORDER — MOXIFLOXACIN 5 MG/ML
1 SOLUTION/ DROPS OPHTHALMIC 3 TIMES DAILY
Qty: 3 ML | Refills: 1 | Status: SHIPPED | OUTPATIENT
Start: 2025-05-19

## 2025-05-19 RX ORDER — ACETAMINOPHEN 325 MG/1
975 TABLET ORAL ONCE
Status: COMPLETED | OUTPATIENT
Start: 2025-05-19 | End: 2025-05-19

## 2025-05-19 RX ORDER — LABETALOL HYDROCHLORIDE 5 MG/ML
10 INJECTION, SOLUTION INTRAVENOUS
Status: DISCONTINUED | OUTPATIENT
Start: 2025-05-19 | End: 2025-05-20 | Stop reason: HOSPADM

## 2025-05-19 RX ORDER — ONDANSETRON 2 MG/ML
4 INJECTION INTRAMUSCULAR; INTRAVENOUS EVERY 30 MIN PRN
Status: DISCONTINUED | OUTPATIENT
Start: 2025-05-19 | End: 2025-05-20 | Stop reason: HOSPADM

## 2025-05-19 RX ORDER — BALANCED SALT SOLUTION 6.4; .75; .48; .3; 3.9; 1.7 MG/ML; MG/ML; MG/ML; MG/ML; MG/ML; MG/ML
SOLUTION OPHTHALMIC PRN
Status: DISCONTINUED | OUTPATIENT
Start: 2025-05-19 | End: 2025-05-19 | Stop reason: HOSPADM

## 2025-05-19 RX ORDER — KETOROLAC TROMETHAMINE 4 MG/ML
1 SOLUTION/ DROPS OPHTHALMIC 4 TIMES DAILY
Qty: 5 ML | Refills: 1 | Status: SHIPPED | OUTPATIENT
Start: 2025-05-19

## 2025-05-19 RX ORDER — POVIDONE-IODINE 5 %
1 SOLUTION, NON-ORAL OPHTHALMIC (EYE) ONCE
Status: DISCONTINUED | OUTPATIENT
Start: 2025-05-19 | End: 2025-05-20 | Stop reason: HOSPADM

## 2025-05-19 RX ORDER — DEXAMETHASONE SODIUM PHOSPHATE 4 MG/ML
INJECTION, SOLUTION INTRA-ARTICULAR; INTRALESIONAL; INTRAMUSCULAR; INTRAVENOUS; SOFT TISSUE PRN
Status: DISCONTINUED | OUTPATIENT
Start: 2025-05-19 | End: 2025-05-19 | Stop reason: HOSPADM

## 2025-05-19 RX ORDER — HYDRALAZINE HYDROCHLORIDE 20 MG/ML
2.5-5 INJECTION INTRAMUSCULAR; INTRAVENOUS EVERY 10 MIN PRN
Status: DISCONTINUED | OUTPATIENT
Start: 2025-05-19 | End: 2025-05-20 | Stop reason: HOSPADM

## 2025-05-19 RX ORDER — FENTANYL CITRATE 50 UG/ML
25 INJECTION, SOLUTION INTRAMUSCULAR; INTRAVENOUS EVERY 5 MIN PRN
Status: DISCONTINUED | OUTPATIENT
Start: 2025-05-19 | End: 2025-05-20 | Stop reason: HOSPADM

## 2025-05-19 RX ORDER — OXYCODONE HYDROCHLORIDE 5 MG/1
10 TABLET ORAL
Status: DISCONTINUED | OUTPATIENT
Start: 2025-05-19 | End: 2025-05-20 | Stop reason: HOSPADM

## 2025-05-19 RX ORDER — CYCLOPENTOLAT/TROPIC/PHENYLEPH 1%-1%-2.5%
1 DROPS (EA) OPHTHALMIC (EYE)
Status: DISCONTINUED | OUTPATIENT
Start: 2025-05-19 | End: 2025-05-20 | Stop reason: HOSPADM

## 2025-05-19 RX ADMIN — Medication 1 DROP: at 11:57

## 2025-05-19 RX ADMIN — SODIUM CHLORIDE, SODIUM LACTATE, POTASSIUM CHLORIDE, CALCIUM CHLORIDE: 600; 310; 30; 20 INJECTION, SOLUTION INTRAVENOUS at 12:04

## 2025-05-19 RX ADMIN — Medication 1 DROP: at 12:04

## 2025-05-19 RX ADMIN — SODIUM CHLORIDE, SODIUM LACTATE, POTASSIUM CHLORIDE, CALCIUM CHLORIDE: 600; 310; 30; 20 INJECTION, SOLUTION INTRAVENOUS at 13:38

## 2025-05-19 RX ADMIN — PROPARACAINE HYDROCHLORIDE 1 DROP: 5 SOLUTION/ DROPS OPHTHALMIC at 11:57

## 2025-05-19 ASSESSMENT — VISUAL ACUITY
OS_SC: 20/50
METHOD: SNELLEN - LINEAR

## 2025-05-19 ASSESSMENT — TONOMETRY
OS_IOP_MMHG: 20
IOP_METHOD: TONOPEN

## 2025-05-19 ASSESSMENT — SLIT LAMP EXAM - LIDS: COMMENTS: NORMAL

## 2025-05-19 NOTE — DISCHARGE INSTRUCTIONS
[facility]  Home Care Following Anesthesia  For 24 hours after surgery:  Get plenty of rest.  A responsible adult must stay with you for at least 24 hours after you leave the surgery center.  Do not drive or use heavy equipment.  If you have weakness or tingling, don't drive or use heavy equipment until this feeling goes away.   Do not drink alcohol.   Avoid strenuous or risky activities.  Ask for help when climbing stairs.  You may feel lightheaded.  IF so, sit for a few minutes before standing.  Have someone help you get up.   If you have nausea (feel sick to your stomach): Drink only clear liquids such as apple juice, ginger ale, broth or 7-Up.  Rest may also help.  Be sure to drink enough fluids.  Move to a regular diet as you feel able.   You may have a slight fever.  Call the doctor if your fever is over 100 F (37.7 C) (taken under the tongue) or lasts longer than 24 hours.  You may have a dry mouth, a sore throat, muscle aches or trouble sleeping. These should go away after 24 hours.  Do not make important or legal decisions.   It is recommended to avoid smoking.               Tips for taking pain medications  To get the best pain relief possible, remember these points:  Take pain medications as directed, before pain becomes severe.  Pain medication can upset your stomach: taking it with food may help.  Constipation is a common side effect of pain medication. Drink plenty of  fluids.  Eat foods high in fiber. Take a stool softener if recommended by your doctor or pharmacist.  Do not drink alcohol, drive or operate machinery while taking pain medications.  Ask about other ways to control pain, such as with heat, ice or relaxation.    Tylenol/Acetaminophen Consumption    If you feel your pain relief is insufficient, you may take Tylenol/Acetaminophen in addition to your narcotic pain medication.   Be careful not to exceed 4,000 mg of Tylenol/Acetaminophen in a 24 hour  period from all sources.  If you are taking extra strength Tylenol/acetaminophen (500 mg), the maximum dose is 8 tablets in 24 hours.  If you are taking regular strength acetaminophen (325 mg), the maximum dose is 12 tablets in 24 hours.    Call a doctor for any of the following:  Signs of infection (fever, growing tenderness at the surgery site, a large amount of drainage or bleeding, severe pain, foul-smelling drainage, redness, swelling).  It has been over 8 to 10 hours since surgery and you are still not able to urinate (pass water).  Headache for over 24 hours.  Numbness, tingling or weakness the day after surgery (if you had spinal anesthesia).  Signs of Covid-19 infection (temperature over 100 degrees, shortness of breath, cough, loss of taste/smell, generalized body aches, persistent headache, chills, sore throat, nausea/vomiting/diarrhea)    Your doctor is:       Dr. Thompson Parnell, Ophthalmology: [phone]               After hours and weekends call the hospital @ [phone] and ask for the resident on call for:  Ophthalmology  For emergency care, call the:  [facility] Emergency Department: [phone] (TTY for hearing impaired: [phone])

## 2025-05-19 NOTE — OP NOTE
REOPERATIVE DIAGNOSIS:  1. Combined cataract, left eye   2. Moderate primary open angle glaucoma (POAG), left eye     POSTOPERATIVE DIAGNOSIS: Same     PROCEDURES:   1. Cataract extraction with toric intraocular lens implant left eye   2. Viscodilation of Schlemm's Canal left eye     SURGEON: Thompson Parnell M.D.  Assistant: Fahad Arrington M.D.         INDICATIONS: The patient Marcos Downing presented to the eye clinic with decreased vision secondary to cataract in the left eye.  The risks, benefits and alternatives to cataract extraction were discussed. The patient elected to proceed. All questions were answered to the patient's satisfaction.     DESCRIPTION OF PROCEDURE:  Prior to the procedure, appropriate cardiac and respiratory monitors were applied to the patient. In the pre-operative holding area, a drop of topical tetracaine followed by lidocaine gel followed by povidone iodine.  The patient was brought to the operating room where a surgical pause was carried out to identify with all members of the surgical team the correct surgical site.  With adequate anesthesia, the left eye was prepped and draped in the usual sterile fashion.  A lid speculum was placed, and the operating microscope was rotated into position. A paracentesis was created.  Through this limbal paracentesis, the anterior chamber was inflated with viscoelastic. A temporal wound was created at the limbus using a 2.5 mm blade. A capsulorrhexis was initiated using a bent 25-gauge needle and was completed in continuous and circular fashion using the capsulorrhexis forceps. The lens nucleus was hydrodissected using balanced salt solution.  The lens nucleus was rotated and removed using phacoemulsification in a stop and chop technique.  Residual cortical material was removed using irrigation-aspiration.  The capsular bag was reinflated to its maximal extent with viscoelastic.  A 19.5 diopter [id number] was inserted into the capsular bag.  The lens  power selected was reviewed using the intraocular lens power measurements that were obtained preoperatively to confirm that the correct lens was selected for the desired post-operative refractive state.  The patient and the operating microscope were repositioned to allow for direct gonioscopy.  A [place] lens was placed to visualize the nasal angle anatomy.  The intended insertion site at the trabecular meshwork was identified.  The Schlemm's canal was cannulated and the OMNI was gently advanced into the canal for 360 of viscodilation.  Wounds were prehydrated with BSS.  The remaining viscoelastic was removed from the anterior chamber in its entirety, and the wounds were hydrated and found to be self-sealing.  During anterior chamber filling, the nasal conjunctiva was observed to rocío well.  Intracameral moxifloxacin was administered. Tactile pressure was confirmed to be in a normal range. Subconjunctival Dexamethasone (2 mg) was injected. The lid speculum was removed and a patch and shield were applied.  The patient tolerated the procedure well, and there were no complications.     PLAN: The patient will be discharged to home and will follow up tomorrow morning in the eye clinic.  EBL:  Minimal   Complications:  None    Implant Name Type Inv. Item Serial No.  Lot No. LRB No. Used Action   LENS IOL TECNIS TORIC  [id number] 19.5 [id number] - [id number] Lens/Eye Implant LENS IOL TECNIS TORIC  [id number] 19.5 [id number] [employer]  Left 1 Implanted               Attending Physician Procedure Attestation: I was present for the entire procedure       Thompson Parnell MD  , Comprehensive Ophthalmology  Department of Ophthalmology and Visual Neurosciences  [facility]

## 2025-05-19 NOTE — TELEPHONE ENCOUNTER
Spoke with patient in clinic to schedule surgery with Dr. Parnell     Spoke with: Marcos (patient)     Date(s) of Surgery: 6/30/25 PER PATIENT REQUEST FOR HEALING TIME      Patient aware of approximate arrival time: Yes at Pre op nursing will call     Tissue: Not Applicable Ordered: Not Applicable      [facility] of surgery[facility]      Pre-Op H&P: [facility]      Informed patient that they need to call to schedule pre-op H&P within 30 days of surgery date: Yes     Post-Op Appt Dates: 7/22/25 9:45am      Discussed with patient pre-op RN will call 2-3 days prior to surgery with arrival time and instructions:  Yes         Standard Surgery Packet Sent: Yes 5/19/25 via mail        Additional Information Sent in Packet:        Informed patient that they will need an adult  to bring patient home from surgery: Yes  : daughter            Additional Comments:  patient stated that per conversation with him and provider following surgery that next day post op was not needed . Writer cancelled follow up         All patients questions were answered and was instructed to review surgical packet and call back [phone] with any questions or concerns.    Anna Layton on 5/19/2025 at 4:25 PM

## 2025-05-19 NOTE — PROGRESS NOTES
Review of systems for the eyes was negative other than the pertinent positives/negatives listed in the HPI.      Assessment & Plan      Marcos Downing is a 85 year old male with the following diagnoses:   1. Pseudophakia    2. Primary open angle glaucoma (POAG) of left eye, moderate stage         PostOp, left eye, day 0 s/p cataract extraction + toric intraocular lens + OMNI 360    Doing well  Keep patch in place at night for 5 days  Start post-operative drops and taper according to instructions  Post-operative do's and don'ts reviewed, questions answered    Recheck 2 weeks with refraction and dilated fundus exam            Attending Physician Attestation:  Complete documentation of historical and exam elements from today's encounter can be found in the full encounter summary report (not reduplicated in this progress note).  I personally obtained the chief complaint(s) and history of present illness.  I confirmed and edited as necessary the review of systems, past medical/surgical history, family history, social history, and examination findings as documented by others; and I examined the patient myself.  I personally reviewed the relevant tests, images, and reports as documented above.  I formulated and edited as necessary the assessment and plan and discussed the findings and management plan with the patient and family. . - Thompson Parnell MD

## 2025-05-19 NOTE — ANESTHESIA POSTPROCEDURE EVALUATION
Patient: Marcos Downing    Procedure: Procedure(s):  LEFT EYE PHACOEMULSIFICATION, CATARACT, WITH INTRAOCULAR LENS IMPLANT, TORIC LENS  MICROCATHETERIZATION, VISCODILATION OF SCHLEMM'S CANAL       Anesthesia Type:  MAC    Note:  Disposition: Outpatient   Postop Pain Control: Uneventful            Sign Out: Well controlled pain   PONV: No   Neuro/Psych: Uneventful            Sign Out: Acceptable/Baseline neuro status   Airway/Respiratory: Uneventful            Sign Out: Acceptable/Baseline resp. status   CV/Hemodynamics: Uneventful            Sign Out: Acceptable CV status; No obvious hypovolemia; No obvious fluid overload   Other NRE: NONE   DID A NON-ROUTINE EVENT OCCUR? No       Last vitals:  Vitals Value Taken Time   /64 05/19/25 14:30   Temp 36.4  C (97.6  F) 05/19/25 14:30   Pulse 70 05/19/25 14:30   Resp 18 05/19/25 14:30   SpO2 98 % 05/19/25 14:30   Vitals shown include unfiled device data.    Electronically Signed By: Gisselle Rodriguez MD  May 19, 2025  2:44 PM

## 2025-05-19 NOTE — ANESTHESIA CARE TRANSFER NOTE
Patient: Marcos Downing    Procedure: Procedure(s):  LEFT EYE PHACOEMULSIFICATION, CATARACT, WITH INTRAOCULAR LENS IMPLANT, TORIC LENS  MICROCATHETERIZATION, VISCODILATION OF SCHLEMM'S CANAL       Diagnosis: Primary open angle glaucoma (POAG) of left eye, severe stage [H40.1123]  Combined form of age-related cataract, both eyes [H25.813]  Diagnosis Additional Information: No value filed.    Anesthesia Type:   MAC     Note:    Oropharynx: oropharynx clear of all foreign objects and spontaneously breathing  Level of Consciousness: awake  Oxygen Supplementation: room air    Independent Airway: airway patency satisfactory and stable    Vital Signs Stable: post-procedure vital signs reviewed and stable  Report to RN Given: handoff report given  Patient transferred to: Phase II    Handoff Report: Identifed the Patient, Identified the Reponsible Provider, Reviewed the pertinent medical history, Discussed the surgical course, Reviewed Intra-OP anesthesia mangement and issues during anesthesia, Set expectations for post-procedure period and Allowed opportunity for questions and acknowledgement of understanding      Vitals:  Vitals Value Taken Time   /72 05/19/25 14:13   Temp     Pulse 73 05/19/25 14:13   Resp     SpO2 98 % 05/19/25 14:15   Vitals shown include unfiled device data.    Electronically Signed By: JACY Ibrahim CRNA  May 19, 2025  2:15 PM

## 2025-06-09 ENCOUNTER — OFFICE VISIT (OUTPATIENT)
Dept: FAMILY MEDICINE | Facility: CLINIC | Age: 85
End: 2025-06-09
Payer: MEDICARE

## 2025-06-09 VITALS
TEMPERATURE: 98.1 F | OXYGEN SATURATION: 97 % | BODY MASS INDEX: 24.08 KG/M2 | WEIGHT: 172 LBS | HEART RATE: 74 BPM | HEIGHT: 71 IN | SYSTOLIC BLOOD PRESSURE: 144 MMHG | RESPIRATION RATE: 18 BRPM | DIASTOLIC BLOOD PRESSURE: 60 MMHG

## 2025-06-09 DIAGNOSIS — R03.0 ELEVATED BP WITHOUT DIAGNOSIS OF HYPERTENSION: ICD-10-CM

## 2025-06-09 DIAGNOSIS — E11.69 TYPE 2 DIABETES MELLITUS WITH OTHER SPECIFIED COMPLICATION, WITHOUT LONG-TERM CURRENT USE OF INSULIN (H): ICD-10-CM

## 2025-06-09 DIAGNOSIS — L98.9 SKIN LESION: ICD-10-CM

## 2025-06-09 DIAGNOSIS — H25.9 SENILE CATARACT OF RIGHT EYE, UNSPECIFIED AGE-RELATED CATARACT TYPE: ICD-10-CM

## 2025-06-09 DIAGNOSIS — Z01.818 PREOP GENERAL PHYSICAL EXAM: Primary | ICD-10-CM

## 2025-06-09 PROCEDURE — 3078F DIAST BP <80 MM HG: CPT | Performed by: FAMILY MEDICINE

## 2025-06-09 PROCEDURE — 3077F SYST BP >= 140 MM HG: CPT | Performed by: FAMILY MEDICINE

## 2025-06-09 PROCEDURE — 99214 OFFICE O/P EST MOD 30 MIN: CPT | Performed by: FAMILY MEDICINE

## 2025-06-09 NOTE — PATIENT INSTRUCTIONS
How to Take Your Medication Before Surgery  Preoperative Medication Instructions        - No identified additional risk factors other than previously addressed    Antiplatelet or Anticoagulation Medication Instructions   - Bleeding risk is low for this procedure (e.g. dental, skin, cataract).    Additional Medication Instructions  We reviewed the medication list hold Metformin while fasting (not to take on morning of surgery); communicated with patient by phone on 6/10/25.    Recommendation  Approval given to proceed with proposed procedure, without further diagnostic evaluation.       Patient Education   Preparing for Your Surgery  For Adults  Getting started  In most cases, a nurse will call to review your health history and instructions. They will give you an arrival time based on your scheduled surgery time. Please be ready to share:  Your doctor's clinic name and phone number  Your medical, surgical, and anesthesia history  A list of allergies and sensitivities  A list of medicines, including herbal treatments and over-the-counter drugs  Whether the patient has a legal guardian (ask how to send us the papers in advance)  Note: You may not receive a call if you were seen at our PAC (Preoperative Assessment Center).  Please tell us if you're pregnant--or if there's any chance you might be pregnant. Some surgeries may injure a fetus (unborn baby), so they require a pregnancy test. Surgeries that are safe for a fetus don't always need a test, and you can choose whether to have one.   Preparing for surgery  Within 10 to 30 days of surgery: Have a pre-op exam (sometimes called an H&P, or History and Physical). This can be done at a clinic or pre-operative center.  If you're having a , you may not need this exam. Talk to your care team.  At your pre-op exam, talk to your care team about all medicines you take. (This includes CBD oil and any drugs, such as THC, marijuana, and other forms of cannabis.) If you  need to stop any medicine before surgery, ask when to start taking it again.  This is for your safety. Many medicines and drugs can make you bleed too much during surgery. Some change how well surgery (anesthesia) drugs work.  Call your insurance company to let them know you're having surgery. (If you don't have insurance, call [phone].)  Call your clinic if there's any change in your health. This includes a scrape or scratch near the surgery site, or any signs of a cold (sore throat, runny nose, cough, rash, fever).  Eating and drinking guidelines  For your safety: Unless your surgeon tells you otherwise, follow the guidelines below.  Eat and drink as normal until 8 hours before you arrive for surgery. After that, no food or milk. You can spit out gum when you arrive.  Drink clear liquids until 2 hours before you arrive. These are liquids you can see through, like water, Gatorade, and Propel Water. They also include plain black coffee and tea (no cream or milk).  No alcohol for 24 hours before you arrive. The night before surgery, stop any drinks that contain THC.  If your care team tells you to take medicine on the morning of surgery, it's okay to take it with a sip of water. No other medicines or drugs are allowed (including CBD oil)--follow your care team's instructions.  If you have questions the day of surgery, call your hospital or surgery center.   Preventing infection  Shower or bathe the night before and the morning of surgery. Follow the instructions your clinic gave you. (If no instructions, use regular soap.)  Don't shave or clip hair near your surgery site. We'll remove the hair if needed.  Don't smoke or vape the morning of surgery. No chewing tobacco for 6 hours before you arrive. A nicotine patch is okay. You may spit out nicotine gum when you arrive.  For some surgeries, the surgeon will tell you to fully quit smoking and nicotine.  We will make every effort to keep you safe from infection. We  will:  Clean our hands often with soap and water (or an alcohol-based hand rub).  Clean the skin at your surgery site with a special soap that kills germs.  Give you a special gown to keep you warm. (Cold raises the risk of infection.)  Wear hair covers, masks, gowns, and gloves during surgery.  Give antibiotic medicine, if prescribed. Not all surgeries need this medicine.  What to bring on the day of surgery  Photo ID and insurance card  Copy of your health care directive, if you have one  Glasses and hearing aids (bring cases)  You can't wear contacts during surgery  Inhaler and eye drops, if you use them (tell us about these when you arrive)  CPAP machine or breathing device, if you use them  A few personal items, if spending the night  If you have . . .  A pacemaker, ICD (cardiac defibrillator), or other implant: Bring the ID card.  An implanted stimulator: Bring the remote control.  A legal guardian: Bring a copy of the certified (court-stamped) guardianship papers.  Please remove any jewelry, including body piercings. Leave jewelry and other valuables at home.  If you're going home the day of surgery  You must have a support person drive you home. They should stay with you overnight, and they may need to help with your self-care.  If you don't have a support person, please tells us as soon as possible. We can help.  After surgery  If it's hard to control your pain or you need more pain medicine, please call your surgeon's office.  Questions?   If you have any questions for your care team, list them here:   ____________________________________________________________________________________________________________________________________________________________________________________________________________________________________________________________  For informational purposes only. Not to replace the advice of your health care provider. Copyright   2003, 2019 [facility]. All rights  reserved. Clinically reviewed by Reinaldo Faith MD. "[employer] 297857 - REV 02/25.

## 2025-06-09 NOTE — PROGRESS NOTES
[facility]  [facility]  [address]  [place] [address]  Phone: [phone]  Primary Provider: Lyndon Patrick MD  Pre-op Performing Provider: Lyndon Patrick MD  Jun 9, 2025   {      6/9/2025   Surgical Information   What procedure is being done? vittal   Facility or Hospital where procedure/surgery will be performed: [facility]   Who is doing the procedure / surgery? Dr Parnell   Date of surgery / procedure: jun 30   Time of surgery / procedure: will call   Where do you plan to recover after surgery? at home with family     Fax number for surgical facility: Note does not need to be faxed, will be available electronically in Epic.    Assessment & Plan     The proposed surgical procedure is considered LOW risk.    Preop general physical exam  Patient planned for cataract surgery perioperative medication management discussed.     Senile cataract of right eye, unspecified age-related cataract type   -Plan for cataract surgery    Type 2 diabetes mellitus with other specified complication, without long-term current use of insulin (H)   -Well-controlled on metformin.    Elevated BP without diagnosis of hypertension   - Borderline high blood pressure    Skin lesion (Left temple)  Discussed evaluation by dermatology  - Adult Dermatology  Referral       - No identified additional risk factors other than previously addressed    Antiplatelet or Anticoagulation Medication Instructions   - Bleeding risk is low for this procedure (e.g. dental, skin, cataract).    Additional Medication Instructions  We reviewed the medication list, will hold metformin while fasting (do not take morning of surgery).    Recommendation  Approval given to proceed with proposed procedure, without further diagnostic evaluation.    Follow-up Weekly      Murali Rodríguez is a 85 year old, presenting for the following:  Pre-Op Exam      BP Readings from Last 6 Encounters:   06/09/25 (!)  144/60   05/19/25 (!) 162/64   05/05/25 (!) 142/74   03/31/25 128/77   01/06/25 116/75   11/26/24 120/74          6/9/2025     3:40 PM   Additional Questions   Roomed by Malu     HPI:         6/9/2025   Pre-Op Questionnaire   Have you ever had a heart attack or stroke? No   Have you ever had surgery on your heart or blood vessels, such as a stent placement, a coronary artery bypass, or surgery on an artery in your head, neck, heart, or legs? No   Do you have chest pain with activity? No   Do you have a history of heart failure? No   Do you currently have a cold, bronchitis or symptoms of other infection? No   Do you have a cough, shortness of breath, or wheezing? No   Do you or anyone in your family have previous history of blood clots? No   Do you or does anyone in your family have a serious bleeding problem such as prolonged bleeding following surgeries or cuts? No   Have you ever had problems with anemia or been told to take iron pills? No   Have you had any abnormal blood loss such as black, tarry or bloody stools? No   Have you ever had a blood transfusion? No   Are you willing to have a blood transfusion if it is medically needed before, during, or after your surgery? Yes   Have you or any of your relatives ever had problems with anesthesia? No   Do you have sleep apnea, excessive snoring or daytime drowsiness? No   Do you have any artifical heart valves or other implanted medical devices like a pacemaker, defibrillator, or continuous glucose monitor? No   Do you have artificial joints? No   Are you allergic to latex? No     Advance Care Planning      Discussed advance care planning with patient; however, patient declined at this time.    Preoperative Review of    reviewed - no record of controlled substances prescribed.      Status of Chronic Conditions:  DIABETES - Patient has a longstanding history of DiabetesType Type II . Patient is being treated with oral agents and denies significant side  effects. Control has been good. Complicating factors include but are not limited to:     Patient Active Problem List    Diagnosis Date Noted    Combined form of age-related cataract, right eye 05/19/2025     Priority: Medium    Primary open angle glaucoma (POAG) of right eye, mild stage 05/19/2025     Priority: Medium    Primary open angle glaucoma (POAG) of left eye, moderate stage 02/28/2025     Priority: Medium    Combined form of age-related cataract, mod, both eyes 07/04/2019     Priority: Medium    Glaucoma suspect of right eye 02/04/2017     Priority: Medium    Posterior vitreous detachment, left 02/04/2017     Priority: Medium    Type 2 diabetes mellitus without retinopathy (H) 01/06/2017     Priority: Medium    Hyperlipidemia LDL goal <100      Priority: Medium     low HDL      Type 2 diabetes mellitus with other specified complication, without long-term current use of insulin (H)      Priority: Medium      Past Medical History:   Diagnosis Date    Cataract 1/19/2012    DM type 2, goal A1c below 7     Glaucoma (increased eye pressure)     Hyperlipidemia with target LDL less than 100     low HDL    Kidney stone     Pre-diabetes     A1C  6.4 in 8/2011     Past Surgical History:   Procedure Laterality Date    CYSTOSCOPY, DILATE URETHRA, COMBINED  1996    MICROCATHETERIZATION, VISCODILATION OF SCHLEMM'S CANAL Left 5/19/2025    Procedure: MICROCATHETERIZATION, VISCODILATION OF SCHLEMM'S CANAL;  Surgeon: Thompson Parnell MD;  Location: [facility] OR    PHACOEMULSIFICATION CLEAR CORNEA WITH TORIC INTRAOCULAR LENS IMPLANT Left 5/19/2025    Procedure: LEFT EYE PHACOEMULSIFICATION, CATARACT, WITH INTRAOCULAR LENS IMPLANT, TORIC LENS;  Surgeon: Thompson Parnell MD;  Location: [facility] OR    TONSILLECTOMY      T & A  age 3    VASECTOMY  Age 39     Current Outpatient Medications   Medication Sig Dispense Refill    ACE/ARB/ARNI NOT PRESCRIBED, INTENTIONAL, Please choose reason not prescribed, below      aspirin 325 MG EC  tablet Take 325 mg by mouth daily.      blood glucose (ACCU-CHEK SMARTVIEW) test strip 1 strip by In Vitro route daily 100 strip 0    blood glucose (NO BRAND SPECIFIED) lancets standard Test once daily 1 Box 1    blood glucose monitoring (NO BRAND SPECIFIED) test strip Test once daily 100 strip 1    blood glucose monitoring (SOFTCLIX) lancets USE 1 DAILY 1 Box 20    ipratropium (ATROVENT) 0.06 % nasal spray SPRAY 2 SPRAYS INTO BOTH NOSTRILS 3 TIMES DAILY AS NEEDED FOR RHINITIS. 15 mL 1    ketorolac tromethamine (ACULAR-LS) 0.4 % SOLN ophthalmic solution Apply 1 drop to eye 4 times daily. To operative eye 5 mL 1    latanoprost (XALATAN) 0.005 % ophthalmic solution Place 1 drop into both eyes at bedtime. 7.5 mL 3    metFORMIN (GLUCOPHAGE XR) 500 MG 24 hr tablet TAKE TWO TABLETS BY MOUTH ONCE DAILY WITH DINNER 180 tablet 0    moxifloxacin (VIGAMOX) 0.5 % ophthalmic solution Apply 1 drop to eye 3 times daily. To operative eye 3 mL 1    Multiple Vitamin (DAILY VITAMINS PO) daily      prednisoLONE acetate (PRED FORTE) 1 % ophthalmic suspension Apply 1 drop to eye 4 times daily. To operative eye 10 mL 1    timolol maleate (TIMOPTIC) 0.5 % ophthalmic solution Place 1 drop Into the left eye every morning. 10 mL 3    Nutritional Supplements (ANTIOXIDANT WITH CO Q-10 PO) Take 1 tablet by mouth daily (Patient not taking: Reported on 2025)         Allergies   Allergen Reactions    Niaspan [Niacin]      Flushing.    Brimonidine Rash     Follicular conjunctivitis from Brimonidine eye drops left eye.        Social History     Tobacco Use    Smoking status: Former     Current packs/day: 0.00     Types: Cigarettes     Quit date: 1971     Years since quittin.2     Passive exposure: Never    Smokeless tobacco: Never   Substance Use Topics    Alcohol use: Yes     Comment: special occasions only     Family History   Problem Relation Age of Onset    Diabetes Mother     Heart Disease Mother     Diabetes Father     Diabetes  "Brother     Cancer Brother     Cancer Sister     Diabetes Sister     Diabetes Brother     Glaucoma No family hx of     Macular Degeneration No family hx of      History   Drug Use No         Review of Systems  Constitutional, neuro, ENT, endocrine, pulmonary, cardiac, gastrointestinal, genitourinary, musculoskeletal, integument and psychiatric systems are negative, except as otherwise noted.    Objective    BP (!) 144/60   Pulse 74   Temp 98.1  F (36.7  C) (Temporal)   Resp 18   Ht 1.81 m (5' 11.26\")   Wt 78 kg (172 lb)   SpO2 97%   BMI 23.81 kg/m     Estimated body mass index is 23.81 kg/m  as calculated from the following:    Height as of this encounter: 1.81 m (5' 11.26\").    Weight as of this encounter: 78 kg (172 lb).  Physical Exam  GENERAL: alert and no distress  EYES: Eyes grossly normal to inspection  HENT: ear canals and TM's normal, nose and mouth without ulcers or lesions  NECK: no adenopathy, no asymmetry, masses, or scars  RESP: lungs clear to auscultation - no rales, rhonchi or wheezes  CV: regular rate and rhythm, no murmur, click or rub, no peripheral edema  ABDOMEN: soft, nontender, no masses and bowel sounds normal  MS: no gross musculoskeletal defects noted, no edema  SKIN: no suspicious lesions or rashes  NEURO: Normal strength and tone, mentation intact and speech normal  PSYCH: mentation appears normal, affect normal/bright    Recent Labs   Lab Test 01/06/25  0809   HGB 14.4         POTASSIUM 4.6   CR 1.08   A1C 7.6*        Diagnostics  No labs were ordered during this visit.   No EKG required for low risk surgery (cataract, skin procedure, breast biopsy, etc).    Revised Cardiac Risk Index (RCRI)  The patient has the following serious cardiovascular risks for perioperative complications:   - No serious cardiac risks = 0 points     RCRI Interpretation: 0 points: Class I (very low risk - 0.4% complication rate)         Signed Electronically by: MD LAUREN Lee " copy of this evaluation report is provided to the requesting physician.

## 2025-06-10 ENCOUNTER — OFFICE VISIT (OUTPATIENT)
Dept: OPHTHALMOLOGY | Facility: CLINIC | Age: 85
End: 2025-06-10
Attending: OPHTHALMOLOGY
Payer: MEDICARE

## 2025-06-10 ENCOUNTER — TELEPHONE (OUTPATIENT)
Dept: DERMATOLOGY | Facility: CLINIC | Age: 85
End: 2025-06-10
Payer: MEDICARE

## 2025-06-10 DIAGNOSIS — H40.1111 PRIMARY OPEN ANGLE GLAUCOMA (POAG) OF RIGHT EYE, MILD STAGE: ICD-10-CM

## 2025-06-10 DIAGNOSIS — H25.811 COMBINED FORM OF AGE-RELATED CATARACT, RIGHT EYE: ICD-10-CM

## 2025-06-10 DIAGNOSIS — Z96.1 PSEUDOPHAKIA, LEFT EYE: ICD-10-CM

## 2025-06-10 DIAGNOSIS — H40.1123 PRIMARY OPEN ANGLE GLAUCOMA (POAG) OF LEFT EYE, SEVERE STAGE: Primary | ICD-10-CM

## 2025-06-10 PROCEDURE — G0463 HOSPITAL OUTPT CLINIC VISIT: HCPCS | Performed by: OPHTHALMOLOGY

## 2025-06-10 ASSESSMENT — TONOMETRY
OS_IOP_MMHG: 26
IOP_METHOD: APPLANATION
IOP_METHOD: TONOPEN
OS_IOP_MMHG: 29
IOP_METHOD: TONOPEN
OD_IOP_MMHG: 15
OS_IOP_MMHG: 27

## 2025-06-10 ASSESSMENT — EXTERNAL EXAM - RIGHT EYE: OD_EXAM: BROW PTOSIS

## 2025-06-10 ASSESSMENT — REFRACTION_WEARINGRX
OS_AXIS: 005
OD_SPHERE: +1.25
OS_SPHERE: +1.50
SPECS_TYPE: DISTANCE ONLY
OD_CYLINDER: +1.00
OD_AXIS: 158
OS_CYLINDER: +1.25

## 2025-06-10 ASSESSMENT — VISUAL ACUITY
OD_PH_CC+: -1
OD_PH_CC: 20/40
OS_SC: 20/40
OD_CC+: -2
METHOD: SNELLEN - LINEAR
OD_CC: 20/50

## 2025-06-10 ASSESSMENT — REFRACTION_MANIFEST
OS_AXIS: 081
OD_SPHERE: +1.00
OD_CYLINDER: +1.00
OD_AXIS: 164
OS_SPHERE: -0.75
OS_CYLINDER: +1.25
OS_ADD: +2.50
OD_ADD: +2.50

## 2025-06-10 ASSESSMENT — EXTERNAL EXAM - LEFT EYE: OS_EXAM: BROW PTOSIS

## 2025-06-10 ASSESSMENT — CUP TO DISC RATIO
OD_RATIO: 0.6
OS_RATIO: 0.7

## 2025-06-10 NOTE — PATIENT INSTRUCTIONS
RIGHT EYE    Latanoprost (green top) every evening     LEFT EYE  Continue prednisolone (pink top) twice a day through Wednesday, then once a day for 3 days, then stop   Resume Latanoprost (green top) every evening   Resume Timolol (yellow top) every morning   Start artificial tears twice a day and as needed

## 2025-06-10 NOTE — TELEPHONE ENCOUNTER
This encounter is being sent to inform the clinic that this patient has a referral from Lyndon Patrick MD in [facility] for the diagnoses of Skin lesion and has requested that this patient be seen within Priority: 1-2 Weeks and/or with Priority: 1-2 Weeks. Based on the availability of our provider(s), we are unable to accommodate this request.    Were all sites offered this patient?  Yes  Pt requesting  location only please due to where he lives and does not like to drive far  Does scheduling algorithm request to schedule next available?  Patient has been scheduled for the first available opening with Dr Rodriguez at  on 01/28/2026.  We have informed the patient that the clinic will review their referral and reach out if a sooner appointment is medically necessary.

## 2025-06-10 NOTE — TELEPHONE ENCOUNTER
Pt called and scheduled in cancellation slot 7/23 with Zara.    Kavitha Maldonado RN on 6/10/2025 at 1:09 PM

## 2025-06-10 NOTE — PROGRESS NOTES
HPI       Post Op (Ophthalmology) Left Eye    In left eye.  Associated symptoms include Negative for dryness, eye pain, flashes, floaters, itching, foreign body sensation and burning.  Response to treatment was significant improvement.  Pain was noted as 0/10.             Comments    Marcos is here post left cataract surgery with IOL implant. Today he states left eye is doing well and he feels he sees better.    Henry Tovar COT 2:01 PM Angela 10, 2025                 Last edited by Henry Tovar on 6/10/2025  2:01 PM.          Review of systems for the eyes was negative other than the pertinent positives/negatives listed in the HPI.      Assessment & Plan      Marcos Downing is a 85 year old male with the following diagnoses:   1. Primary open angle glaucoma (POAG) of left eye, severe stage    2. Primary open angle glaucoma (POAG) of right eye, mild stage    3. Pseudophakia, left eye    4. Combined form of age-related cataract, right eye       POW3 s/p Toric CEIOL and OMNI left eye   IOP elevated to 29 left eye today   Patient self-stopped Timolol/Latanoprost in the left eye following surgery  Using only prednisolone twice a day left eye     Hasten pred taper (twice a day x 3 days, daily x 3 days, then stop)  Recommend resuming Timolol QAM and latanoprost at bedtime  Start artificial tears twice a day and as needed   Planned CEIOL + iStent right eye on 6/30/25      Patient disposition:   Return in about 2 weeks (around 6/24/2025) for Refraction, repeat calcs/andrade, [place] only.      Fahad Arrington MD  Resident Physician, PGY-3  Department of Ophthalmology      Attending Physician Attestation:  Complete documentation of historical and exam elements from today's encounter can be found in the full encounter summary report (not reduplicated in this progress note).  I personally obtained the chief complaint(s) and history of present illness.  I confirmed and edited as necessary the review of systems, past medical/surgical  history, family history, social history, and examination findings as documented by others; and I examined the patient myself.  I personally reviewed the relevant tests, images, and reports as documented above.  I formulated and edited as necessary the assessment and plan and discussed the findings and management plan with the patient and family. . - Thompson Parnell MD

## 2025-06-25 ENCOUNTER — OFFICE VISIT (OUTPATIENT)
Dept: OPHTHALMOLOGY | Facility: CLINIC | Age: 85
End: 2025-06-25
Attending: OPHTHALMOLOGY
Payer: MEDICARE

## 2025-06-25 DIAGNOSIS — E11.65 TYPE 2 DIABETES MELLITUS WITH HYPERGLYCEMIA, WITHOUT LONG-TERM CURRENT USE OF INSULIN (H): ICD-10-CM

## 2025-06-25 DIAGNOSIS — Z96.1 PSEUDOPHAKIA, LEFT EYE: Primary | ICD-10-CM

## 2025-06-25 DIAGNOSIS — H40.1123 PRIMARY OPEN ANGLE GLAUCOMA (POAG) OF LEFT EYE, SEVERE STAGE: ICD-10-CM

## 2025-06-25 DIAGNOSIS — H25.811 COMBINED FORM OF AGE-RELATED CATARACT, RIGHT EYE: ICD-10-CM

## 2025-06-25 PROCEDURE — G0463 HOSPITAL OUTPT CLINIC VISIT: HCPCS | Performed by: OPHTHALMOLOGY

## 2025-06-25 PROCEDURE — 92025 CPTRIZED CORNEAL TOPOGRAPHY: CPT | Performed by: OPHTHALMOLOGY

## 2025-06-25 PROCEDURE — 76519 ECHO EXAM OF EYE: CPT | Performed by: OPHTHALMOLOGY

## 2025-06-25 RX ORDER — AMPICILLIN TRIHYDRATE 250 MG
CAPSULE ORAL
COMMUNITY

## 2025-06-25 RX ORDER — METFORMIN HYDROCHLORIDE 500 MG/1
1000 TABLET, EXTENDED RELEASE ORAL
Qty: 180 TABLET | Refills: 0 | Status: SHIPPED | OUTPATIENT
Start: 2025-06-25

## 2025-06-25 ASSESSMENT — EXTERNAL EXAM - RIGHT EYE: OD_EXAM: BROW PTOSIS

## 2025-06-25 ASSESSMENT — TONOMETRY
OS_IOP_MMHG: 16
IOP_METHOD: TONOPEN
OD_IOP_MMHG: 15

## 2025-06-25 ASSESSMENT — VISUAL ACUITY
METHOD_MR: DIAGNOSTIC
OD_SC+: -1
OD_SC: 20/50
OS_SC+: -1
OD_PH_SC+: -2
METHOD: SNELLEN - LINEAR
OS_SC: 20/25
OD_PH_SC: 20/40

## 2025-06-25 ASSESSMENT — REFRACTION_MANIFEST
OS_AXIS: 180
OS_CYLINDER: +0.25
OS_SPHERE: +0.25

## 2025-06-25 ASSESSMENT — EXTERNAL EXAM - LEFT EYE: OS_EXAM: BROW PTOSIS

## 2025-06-25 NOTE — PROGRESS NOTES
HPI       Post Op (Ophthalmology) Left Eye    In left eye.  Since onset it is gradually improving.  Associated symptoms include itching.  Negative for dryness and eye pain.  Treatments tried include eye drops and artificial tears.  Pain was noted as 0/10. Additional comments: Post Cataract extraction with toric intraocular lens implant left eye and Viscodilation of Schlemm's Canal left eye on 05/19/2025               Comments    Patient comments that his vision in the left ye is brighter and clearer than that of his right eye.  He continues to have glare with his right eye.    He is using Timolol QAM left eye and latanoprost at bedtime in each eye .    IOANA Orozco 2:14 PM  June 25, 2025               Last edited by Karyn Wadsworth COT on 6/25/2025  2:16 PM.          Review of systems for the eyes was negative other than the pertinent positives/negatives listed in the HPI.      Assessment & Plan      Marcos Downing is a 85 year old male with the following diagnoses:   1. Pseudophakia, left eye    2. Primary open angle glaucoma (POAG) of left eye, severe stage           s/p Toric CEIOL and OMNI left eye   Intraocular pressure improved with steroid cessation and resuming Timolol/Latanoprost in the left eye  Happy with vision     Cont Timolol QAM and latanoprost at bedtime for now  Artificial tears twice a day and as needed   Planned CEIOL + iStent right eye on 6/30/25  May need YAG left eye later this summer  Return precautions reviewed            Attending Physician Attestation:  Complete documentation of historical and exam elements from today's encounter can be found in the full encounter summary report (not reduplicated in this progress note).  I personally obtained the chief complaint(s) and history of present illness.  I confirmed and edited as necessary the review of systems, past medical/surgical history, family history, social history, and examination findings as documented by others; and I  examined the patient myself.  I personally reviewed the relevant tests, images, and reports as documented above.  I formulated and edited as necessary the assessment and plan and discussed the findings and management plan with the patient and family. . - Thompson Parnell MD

## 2025-06-27 ENCOUNTER — ANESTHESIA EVENT (OUTPATIENT)
Dept: SURGERY | Facility: AMBULATORY SURGERY CENTER | Age: 85
End: 2025-06-27
Payer: MEDICARE

## 2025-06-30 ENCOUNTER — OFFICE VISIT (OUTPATIENT)
Dept: OPHTHALMOLOGY | Facility: CLINIC | Age: 85
End: 2025-06-30

## 2025-06-30 ENCOUNTER — ANESTHESIA (OUTPATIENT)
Dept: SURGERY | Facility: AMBULATORY SURGERY CENTER | Age: 85
End: 2025-06-30
Payer: MEDICARE

## 2025-06-30 ENCOUNTER — HOSPITAL ENCOUNTER (OUTPATIENT)
Facility: AMBULATORY SURGERY CENTER | Age: 85
Discharge: HOME | End: 2025-06-30
Attending: OPHTHALMOLOGY
Payer: MEDICARE

## 2025-06-30 VITALS
DIASTOLIC BLOOD PRESSURE: 61 MMHG | HEIGHT: 72 IN | TEMPERATURE: 97.2 F | OXYGEN SATURATION: 99 % | WEIGHT: 175 LBS | HEART RATE: 62 BPM | BODY MASS INDEX: 23.7 KG/M2 | RESPIRATION RATE: 18 BRPM | SYSTOLIC BLOOD PRESSURE: 146 MMHG

## 2025-06-30 DIAGNOSIS — H40.1122 PRIMARY OPEN ANGLE GLAUCOMA (POAG) OF LEFT EYE, MODERATE STAGE: ICD-10-CM

## 2025-06-30 DIAGNOSIS — H40.1111 PRIMARY OPEN ANGLE GLAUCOMA (POAG) OF RIGHT EYE, MILD STAGE: ICD-10-CM

## 2025-06-30 DIAGNOSIS — H25.811 COMBINED FORM OF AGE-RELATED CATARACT, RIGHT EYE: Primary | ICD-10-CM

## 2025-06-30 DIAGNOSIS — Z98.890 POSTOPERATIVE EYE STATE: Primary | ICD-10-CM

## 2025-06-30 LAB — GLUCOSE BLDC GLUCOMTR-MCNC: 237 MG/DL (ref 70–99)

## 2025-06-30 PROCEDURE — C1783 OCULAR IMP, AQUEOUS DRAIN DE: HCPCS | Mod: RT

## 2025-06-30 PROCEDURE — 96999 UNLISTED SPEC DERM SVC/PX: CPT | Mod: RT | Performed by: OPHTHALMOLOGY

## 2025-06-30 PROCEDURE — 66991 XCAPSL CTRC RMVL INSJ 1+: CPT | Mod: 79 | Performed by: OPHTHALMOLOGY

## 2025-06-30 PROCEDURE — 99024 POSTOP FOLLOW-UP VISIT: CPT | Mod: GC | Performed by: OPHTHALMOLOGY

## 2025-06-30 PROCEDURE — V2599 CONTACT LENS/ES OTHER TYPE: HCPCS | Mod: DBP,RT

## 2025-06-30 PROCEDURE — 66991 XCAPSL CTRC RMVL INSJ 1+: CPT | Mod: RT

## 2025-06-30 DEVICE — TEC EYHANCE TOR II SMPLCTY 19.5D CYL3.00
Type: IMPLANTABLE DEVICE | Site: EYE | Status: FUNCTIONAL
Brand: TECNIS IOL

## 2025-06-30 DEVICE — TRABECULAR MICRO-BYPASS STENT SYSTEM - LEFT
Type: IMPLANTABLE DEVICE | Site: EYE | Status: FUNCTIONAL
Brand: ISTENT

## 2025-06-30 RX ORDER — ONDANSETRON 4 MG/1
4 TABLET, ORALLY DISINTEGRATING ORAL EVERY 30 MIN PRN
Status: DISCONTINUED | OUTPATIENT
Start: 2025-06-30 | End: 2025-07-01 | Stop reason: HOSPADM

## 2025-06-30 RX ORDER — LIDOCAINE 40 MG/G
CREAM TOPICAL
Status: DISCONTINUED | OUTPATIENT
Start: 2025-06-30 | End: 2025-06-30 | Stop reason: HOSPADM

## 2025-06-30 RX ORDER — OXYCODONE HYDROCHLORIDE 5 MG/1
10 TABLET ORAL
Status: DISCONTINUED | OUTPATIENT
Start: 2025-06-30 | End: 2025-07-01 | Stop reason: HOSPADM

## 2025-06-30 RX ORDER — FENTANYL CITRATE 50 UG/ML
50 INJECTION, SOLUTION INTRAMUSCULAR; INTRAVENOUS EVERY 5 MIN PRN
Status: DISCONTINUED | OUTPATIENT
Start: 2025-06-30 | End: 2025-06-30 | Stop reason: HOSPADM

## 2025-06-30 RX ORDER — MOXIFLOXACIN IN NACL,ISO-OS/PF 0.3MG/0.3
SYRINGE (ML) INTRAOCULAR PRN
Status: DISCONTINUED | OUTPATIENT
Start: 2025-06-30 | End: 2025-06-30 | Stop reason: HOSPADM

## 2025-06-30 RX ORDER — DEXAMETHASONE SODIUM PHOSPHATE 4 MG/ML
INJECTION, SOLUTION INTRA-ARTICULAR; INTRALESIONAL; INTRAMUSCULAR; INTRAVENOUS; SOFT TISSUE PRN
Status: DISCONTINUED | OUTPATIENT
Start: 2025-06-30 | End: 2025-06-30 | Stop reason: HOSPADM

## 2025-06-30 RX ORDER — BALANCED SALT SOLUTION 6.4; .75; .48; .3; 3.9; 1.7 MG/ML; MG/ML; MG/ML; MG/ML; MG/ML; MG/ML
SOLUTION OPHTHALMIC PRN
Status: DISCONTINUED | OUTPATIENT
Start: 2025-06-30 | End: 2025-06-30 | Stop reason: HOSPADM

## 2025-06-30 RX ORDER — LIDOCAINE HYDROCHLORIDE 10 MG/ML
INJECTION, SOLUTION EPIDURAL; INFILTRATION; INTRACAUDAL; PERINEURAL PRN
Status: DISCONTINUED | OUTPATIENT
Start: 2025-06-30 | End: 2025-06-30 | Stop reason: HOSPADM

## 2025-06-30 RX ORDER — SODIUM CHLORIDE, SODIUM LACTATE, POTASSIUM CHLORIDE, CALCIUM CHLORIDE 600; 310; 30; 20 MG/100ML; MG/100ML; MG/100ML; MG/100ML
INJECTION, SOLUTION INTRAVENOUS CONTINUOUS
Status: DISCONTINUED | OUTPATIENT
Start: 2025-06-30 | End: 2025-06-30 | Stop reason: HOSPADM

## 2025-06-30 RX ORDER — PREDNISOLONE ACETATE 10 MG/ML
1 SUSPENSION/ DROPS OPHTHALMIC 4 TIMES DAILY
Qty: 10 ML | Refills: 1 | Status: SHIPPED | OUTPATIENT
Start: 2025-06-30

## 2025-06-30 RX ORDER — DEXAMETHASONE SODIUM PHOSPHATE 10 MG/ML
4 INJECTION, SOLUTION INTRAMUSCULAR; INTRAVENOUS
Status: DISCONTINUED | OUTPATIENT
Start: 2025-06-30 | End: 2025-07-01 | Stop reason: HOSPADM

## 2025-06-30 RX ORDER — SODIUM CHLORIDE, SODIUM LACTATE, POTASSIUM CHLORIDE, CALCIUM CHLORIDE 600; 310; 30; 20 MG/100ML; MG/100ML; MG/100ML; MG/100ML
INJECTION, SOLUTION INTRAVENOUS CONTINUOUS
Status: DISCONTINUED | OUTPATIENT
Start: 2025-06-30 | End: 2025-07-01 | Stop reason: HOSPADM

## 2025-06-30 RX ORDER — HYDROMORPHONE HYDROCHLORIDE 1 MG/ML
0.2 INJECTION, SOLUTION INTRAMUSCULAR; INTRAVENOUS; SUBCUTANEOUS EVERY 5 MIN PRN
Status: DISCONTINUED | OUTPATIENT
Start: 2025-06-30 | End: 2025-06-30 | Stop reason: HOSPADM

## 2025-06-30 RX ORDER — NALOXONE HYDROCHLORIDE 0.4 MG/ML
0.1 INJECTION, SOLUTION INTRAMUSCULAR; INTRAVENOUS; SUBCUTANEOUS
Status: DISCONTINUED | OUTPATIENT
Start: 2025-06-30 | End: 2025-07-01 | Stop reason: HOSPADM

## 2025-06-30 RX ORDER — OXYCODONE HYDROCHLORIDE 5 MG/1
5 TABLET ORAL
Status: DISCONTINUED | OUTPATIENT
Start: 2025-06-30 | End: 2025-07-01 | Stop reason: HOSPADM

## 2025-06-30 RX ORDER — FENTANYL CITRATE 50 UG/ML
INJECTION, SOLUTION INTRAMUSCULAR; INTRAVENOUS PRN
Status: DISCONTINUED | OUTPATIENT
Start: 2025-06-30 | End: 2025-06-30

## 2025-06-30 RX ORDER — POVIDONE-IODINE 5 %
1 SOLUTION, NON-ORAL OPHTHALMIC (EYE) ONCE
Status: DISCONTINUED | OUTPATIENT
Start: 2025-06-30 | End: 2025-06-30 | Stop reason: HOSPADM

## 2025-06-30 RX ORDER — HYDROMORPHONE HYDROCHLORIDE 1 MG/ML
0.4 INJECTION, SOLUTION INTRAMUSCULAR; INTRAVENOUS; SUBCUTANEOUS EVERY 5 MIN PRN
Status: DISCONTINUED | OUTPATIENT
Start: 2025-06-30 | End: 2025-06-30 | Stop reason: HOSPADM

## 2025-06-30 RX ORDER — CYCLOPENTOLAT/TROPIC/PHENYLEPH 1%-1%-2.5%
1 DROPS (EA) OPHTHALMIC (EYE)
Status: COMPLETED | OUTPATIENT
Start: 2025-06-30 | End: 2025-06-30

## 2025-06-30 RX ORDER — MOXIFLOXACIN 5 MG/ML
1 SOLUTION/ DROPS OPHTHALMIC 3 TIMES DAILY
Qty: 3 ML | Refills: 1 | Status: SHIPPED | OUTPATIENT
Start: 2025-06-30

## 2025-06-30 RX ORDER — NALOXONE HYDROCHLORIDE 0.4 MG/ML
0.1 INJECTION, SOLUTION INTRAMUSCULAR; INTRAVENOUS; SUBCUTANEOUS
Status: DISCONTINUED | OUTPATIENT
Start: 2025-06-30 | End: 2025-06-30 | Stop reason: HOSPADM

## 2025-06-30 RX ORDER — TETRACAINE HYDROCHLORIDE 5 MG/ML
SOLUTION OPHTHALMIC PRN
Status: DISCONTINUED | OUTPATIENT
Start: 2025-06-30 | End: 2025-06-30 | Stop reason: HOSPADM

## 2025-06-30 RX ORDER — FENTANYL CITRATE 50 UG/ML
25 INJECTION, SOLUTION INTRAMUSCULAR; INTRAVENOUS EVERY 5 MIN PRN
Status: DISCONTINUED | OUTPATIENT
Start: 2025-06-30 | End: 2025-06-30 | Stop reason: HOSPADM

## 2025-06-30 RX ORDER — PROPARACAINE HYDROCHLORIDE 5 MG/ML
1 SOLUTION/ DROPS OPHTHALMIC ONCE
Status: COMPLETED | OUTPATIENT
Start: 2025-06-30 | End: 2025-06-30

## 2025-06-30 RX ORDER — DEXAMETHASONE SODIUM PHOSPHATE 10 MG/ML
4 INJECTION, SOLUTION INTRAMUSCULAR; INTRAVENOUS
Status: DISCONTINUED | OUTPATIENT
Start: 2025-06-30 | End: 2025-06-30 | Stop reason: HOSPADM

## 2025-06-30 RX ORDER — ONDANSETRON 2 MG/ML
4 INJECTION INTRAMUSCULAR; INTRAVENOUS EVERY 30 MIN PRN
Status: DISCONTINUED | OUTPATIENT
Start: 2025-06-30 | End: 2025-06-30 | Stop reason: HOSPADM

## 2025-06-30 RX ORDER — SODIUM CHLORIDE, SODIUM LACTATE, POTASSIUM CHLORIDE, CALCIUM CHLORIDE 600; 310; 30; 20 MG/100ML; MG/100ML; MG/100ML; MG/100ML
INJECTION, SOLUTION INTRAVENOUS CONTINUOUS PRN
Status: DISCONTINUED | OUTPATIENT
Start: 2025-06-30 | End: 2025-06-30

## 2025-06-30 RX ORDER — TIMOLOL MALEATE 5 MG/ML
SOLUTION/ DROPS OPHTHALMIC PRN
Status: DISCONTINUED | OUTPATIENT
Start: 2025-06-30 | End: 2025-06-30 | Stop reason: HOSPADM

## 2025-06-30 RX ORDER — ONDANSETRON 2 MG/ML
4 INJECTION INTRAMUSCULAR; INTRAVENOUS EVERY 30 MIN PRN
Status: DISCONTINUED | OUTPATIENT
Start: 2025-06-30 | End: 2025-07-01 | Stop reason: HOSPADM

## 2025-06-30 RX ORDER — ONDANSETRON 4 MG/1
4 TABLET, ORALLY DISINTEGRATING ORAL EVERY 30 MIN PRN
Status: DISCONTINUED | OUTPATIENT
Start: 2025-06-30 | End: 2025-06-30 | Stop reason: HOSPADM

## 2025-06-30 RX ORDER — ACETAMINOPHEN 325 MG/1
975 TABLET ORAL ONCE
Status: COMPLETED | OUTPATIENT
Start: 2025-06-30 | End: 2025-06-30

## 2025-06-30 RX ADMIN — FENTANYL CITRATE 25 MCG: 50 INJECTION, SOLUTION INTRAMUSCULAR; INTRAVENOUS at 10:28

## 2025-06-30 RX ADMIN — Medication 1 DROP: at 09:21

## 2025-06-30 RX ADMIN — PROPARACAINE HYDROCHLORIDE 1 DROP: 5 SOLUTION/ DROPS OPHTHALMIC at 09:21

## 2025-06-30 RX ADMIN — Medication 1 DROP: at 09:29

## 2025-06-30 RX ADMIN — SODIUM CHLORIDE, SODIUM LACTATE, POTASSIUM CHLORIDE, CALCIUM CHLORIDE: 600; 310; 30; 20 INJECTION, SOLUTION INTRAVENOUS at 09:28

## 2025-06-30 RX ADMIN — Medication 1 DROP: at 09:32

## 2025-06-30 RX ADMIN — SODIUM CHLORIDE, SODIUM LACTATE, POTASSIUM CHLORIDE, CALCIUM CHLORIDE: 600; 310; 30; 20 INJECTION, SOLUTION INTRAVENOUS at 10:10

## 2025-06-30 ASSESSMENT — VISUAL ACUITY
OD_SC: 20/63
METHOD: SNELLEN - LINEAR

## 2025-06-30 ASSESSMENT — SLIT LAMP EXAM - LIDS: COMMENTS: NORMAL

## 2025-06-30 ASSESSMENT — TONOMETRY
IOP_METHOD: TONOPEN
OD_IOP_MMHG: 14

## 2025-06-30 ASSESSMENT — EXTERNAL EXAM - RIGHT EYE: OD_EXAM: NORMAL

## 2025-06-30 NOTE — PROGRESS NOTES
POD#0, status post cataract surgery, right eye     Impression/Plan:  Pseudophakia, right: POD0, good post-operative appearance. IOP reasonable.     Eye protection at all times and eye shield at night for 1 week.     Limited activities with no exercise or heavy lifting for 1 week.     Instructed patient to contact us for decreasing vision, eye pain, new floaters or flashes of light or other concerning symptoms.     Written instructions given    Drops:  Moxifloxacin TID for 7 days in operative eye  Prednisolone 4/3/2/1 taper every 7 days in operative eye    Follow up scheduled on 7/22/25.    All questions were answered    Fahad Arrington MD  Resident Physician, PGY-3  Department of Ophthalmology        Attending Physician Attestation:  Complete documentation of historical and exam elements from today's encounter can be found in the full encounter summary report (not reduplicated in this progress note).  I personally obtained the chief complaint(s) and history of present illness.  I confirmed and edited as necessary the review of systems, past medical/surgical history, family history, social history, and examination findings as documented by others; and I examined the patient myself.  I personally reviewed the relevant tests, images, and reports as documented above.  I formulated and edited as necessary the assessment and plan and discussed the findings and management plan with the patient and family. . - Thompson Parnell MD

## 2025-06-30 NOTE — ANESTHESIA CARE TRANSFER NOTE
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    Anesthesia Type:   MAC     Note:    Oropharynx: oropharynx clear of all foreign objects  Level of Consciousness: awake  Oxygen Supplementation: room air    Independent Airway: airway patency satisfactory and stable  Dentition: dentition unchanged  Vital Signs Stable: post-procedure vital signs reviewed and stable  Report to RN Given: handoff report given  Patient transferred to: Phase II    Handoff Report: Identifed the Patient, Identified the Reponsible Provider, Reviewed the pertinent medical history, Discussed the surgical course, Reviewed Intra-OP anesthesia mangement and issues during anesthesia, Set expectations for post-procedure period and Allowed opportunity for questions and acknowledgement of understanding      Vitals:  Vitals Value Taken Time   /68 06/30/25 10:59   Temp 36.1  C (97  F) 06/30/25 10:59   Pulse 68 06/30/25 10:59   Resp 18 06/30/25 10:59   SpO2 99 % 06/30/25 11:00   Vitals shown include unfiled device data.    Electronically Signed By: JACY Price CRNA  June 30, 2025  11:01 AM

## 2025-06-30 NOTE — ANESTHESIA PREPROCEDURE EVALUATION
Anesthesia Pre-Procedure Evaluation    Patient: Marcos Downing   MRN: [id number] : 1940          Procedure :     RIGHT EYE PHACOEMULSIFICATION, CATARACT, WITH INTRAOCULAR LENS IMPLANT, TORIC LENS (Right: Eye)       INSERTION of iSTENT (Right: Eye)     Past Medical History:   Diagnosis Date    Cataract 2012    DM type 2, goal A1c below 7     Glaucoma (increased eye pressure)     Hyperlipidemia with target LDL less than 100     low HDL    Kidney stone     Pre-diabetes     A1C  6.4 in 2011      Past Surgical History:   Procedure Laterality Date    CATARACT IOL, RT/LT      CYSTOSCOPY, DILATE URETHRA, COMBINED      MICROCATHETERIZATION, VISCODILATION OF SCHLEMM'S CANAL Left 2025    Procedure: MICROCATHETERIZATION, VISCODILATION OF SCHLEMM'S CANAL;  Surgeon: Thompson Parnell MD;  Location: [facility] OR    PHACOEMULSIFICATION CLEAR CORNEA WITH TORIC INTRAOCULAR LENS IMPLANT Left 2025    Procedure: LEFT EYE PHACOEMULSIFICATION, CATARACT, WITH INTRAOCULAR LENS IMPLANT, TORIC LENS;  Surgeon: Thompson Parnell MD;  Location: [facility] OR    TONSILLECTOMY      T & A  age 3    VASECTOMY  Age 39      Allergies   Allergen Reactions    Niaspan [Niacin]      Flushing.    Brimonidine Rash     Follicular conjunctivitis from Brimonidine eye drops left eye.      Social History     Tobacco Use    Smoking status: Former     Current packs/day: 0.00     Types: Cigarettes     Quit date: 1971     Years since quittin.2     Passive exposure: Never    Smokeless tobacco: Never   Substance Use Topics    Alcohol use: Yes     Comment: special occasions only      Wt Readings from Last 1 Encounters:   25 79.4 kg (175 lb)        Anesthesia Evaluation            ROS/MED HX  ENT/Pulmonary:  - neg pulmonary ROS     Neurologic:  - neg neurologic ROS     Cardiovascular:     (+) Dyslipidemia - -   -  - -                                      METS/Exercise Tolerance:     Hematologic:  - neg hematologic  ROS    "  Musculoskeletal:  - neg musculoskeletal ROS     GI/Hepatic:  - neg GI/hepatic ROS     Renal/Genitourinary:  - neg Renal ROS     Endo:     (+)  type II DM,   Not using insulin,                 Psychiatric/Substance Use:  - neg psychiatric ROS     Infectious Disease:  - neg infectious disease ROS     Malignancy:       Other:              Physical Exam  Airway  Mallampati: II  TM distance: >3 FB  Neck ROM: full  Mouth opening: >= 4 cm    Cardiovascular   Rhythm: regular  Rate: tachycardia     Dental   (+) Multiple crowns, permanent bridges      Pulmonary - normal examBreath sounds clear to auscultation        Neurological - normal exam  He appears awake, alert and oriented x3.    Other Findings       OUTSIDE LABS:  CBC:   Lab Results   Component Value Date    WBC 6.1 01/06/2025    WBC 7.0 04/06/2011    HGB 14.4 01/06/2025    HGB 15.8 04/06/2011    HCT 42.4 01/06/2025    HCT 46.8 04/06/2011     01/06/2025     04/06/2011     BMP:   Lab Results   Component Value Date     01/06/2025     12/18/2023    POTASSIUM 4.6 01/06/2025    POTASSIUM 4.2 12/18/2023    CHLORIDE 103 01/06/2025    CHLORIDE 105 12/18/2023    CO2 27 01/06/2025    CO2 25 12/18/2023    BUN 28.6 (H) 01/06/2025    BUN 24.8 (H) 12/18/2023    CR 1.08 01/06/2025    CR 0.97 12/18/2023     (H) 06/30/2025     (H) 05/19/2025     COAGS: No results found for: \"PTT\", \"INR\", \"FIBR\"  POC: No results found for: \"BGM\", \"HCG\", \"HCGS\"  HEPATIC:   Lab Results   Component Value Date    ALBUMIN 3.9 01/06/2025    PROTTOTAL 6.8 01/06/2025    ALT 15 01/06/2025    AST 18 01/06/2025    ALKPHOS 89 01/06/2025    BILITOTAL 0.5 01/06/2025     OTHER:   Lab Results   Component Value Date    A1C 7.6 (H) 01/06/2025    GIL 9.5 01/06/2025    LIPASE 97 12/12/2022    TSH 1.58 07/02/2018       Anesthesia Plan    ASA Status:  2      NPO Status: NPO Appropriate   Anesthesia Type: MAC.   Techniques and Equipment:       - Monitoring Plan: standard ASA " monitoring     Consents    Anesthesia Plan(s) and associated risks, benefits, and realistic alternatives discussed. Questions answered and patient/representative(s) expressed understanding.     - Discussed: CRNA, surgeon     - Discussed with:  Patient        - Pt is DNR/DNI Status: no DNR          Postoperative Care    Pain management: multimodal analgesia.     Comments:                   Aggie Fernandez MD    I have reviewed the pertinent notes and labs in the chart from the past 30 days and (re)examined the patient.  Any updates or changes from those notes are reflected in this note.    Clinically Significant Risk Factors Present on Admission                 # Drug Induced Platelet Defect: home medication list includes an antiplatelet medication   # Hypertension: Home medication list includes antihypertensive(s)          # DMII: A1C = N/A within past 6 months

## 2025-06-30 NOTE — OP NOTE
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 Cataract extraction with intraocular lens implant right eye   2. Insertion of iStent, right eye   SURGEON: Thompson Parnell M.D.  Assistant: Fahad Arrington M.D.       INDICATIONS: The patient Marcos Downing presented to the eye clinic with decreased vision secondary to cataract in the right eye.  The risks, benefits and alternatives to cataract extraction were discussed. The patient elected to proceed. All questions were answered to the patient's satisfaction.   DESCRIPTION OF PROCEDURE:   Prior to the procedure, appropriate cardiac and respiratory monitors were applied to the patient.  In the pre-operative holding area, a drop of topical tetracaine followed by lidocaine gel followed by povidone iodine.  Pre-operative toric markings were placed at the 90-degree axis using a gentian violet marker while the patient was seated upright.  The patient was brought to the operating room where a surgical pause was carried out to identify with all members of the surgical team the correct surgical site.  With adequate anesthesia, the right eye was prepped and draped in the usual sterile fashion. A lid speculum was placed, and the operating microscope was rotated into position.  Toric axis was reviewed and marked according to pre-operative calculations and the 90-degree marking that had previously been placed.  A paracentesis was created.  Through this limbal paracentesis, the anterior chamber was filled with preservative-free lidocaine followed by viscoelastic.  A temporal wound was created at the limbus using a 2.6 mm blade. A capsulorrhexis was initiated using a bent 25-gauge needle and was completed in continuous and circular fashion using the capsulorrhexis forceps. The lens nucleus was hydrodissected using balanced salt solution.  The lens nucleus was rotated and  removed using phacoemulsification in a stop and chop technique.  Residual cortical material was removed using irrigation-aspiration.  The capsular bag was reinflated to its maximal extent with cohesive viscoelastic.  A 19.5 diopter [id number] was inserted into the capsular bag.  The lens power selected was reviewed using the intraocular lens power measurements that were obtained preoperatively to confirm that the correct lens was selected for the desired post-operative refractive state. Removal of the viscoelastic from the posterior capsular space was removed.  The patient and the operating microscope were repositioned to allow for direct gonioscopy.  A [place] lens was placed to visualize the nasal angle anatomy.  The intended insertion site at the trabecular meshwork was identified.  Using a left-directed iStent, the Schlemm's canal was cannulated.  Gentle manipulation was done to confirm seating of the device and proper positioning within the angle.  The iStent drains by direct bypass through the trabecular meshwork into the Schlemm Canal.  The remaining viscoelastic was removed from the anterior chamber in its entirety, the wound were hydrated and found to be self-sealing.  Intracameral moxifloxacin was administered. Tactile pressure was confirmed to be in a normal range.  Subconjunctival Dexamethasone (2 mg) was injected. The lid speculum was removed and a patch and shield were applied.  The patient tolerated the procedure well, and there were no complications.     PLAN: The patient will be discharged to home and will follow up tomorrow morning in the eye clinic.  EBL:  Minimal   Complications:  None  Implant Name Type Inv. Item Serial No.  Lot No. LRB No. Used Action   EYE IMP ISTENT TRABECULAR MICRO-BYPASS LT [id number] - [id number] Lens/Eye Implant EYE IMP ISTENT TRABECULAR MICRO-BYPASS LT [id number] [facility] [id number] Right 1 Implanted   LENS IOL TECNIS TORIC  [id number] 19.5 [id number] -  [id number] Lens/Eye Implant LENS IOL TECNIS TORIC  [id number] 19.5 [id number] [employer]  Right 1 Implanted                  Attending Physician Procedure Attestation: I was present for the entire procedure       Thompson Parnell MD  , Comprehensive Ophthalmology  Department of Ophthalmology and Visual Neurosciences  [facility]

## 2025-06-30 NOTE — ANESTHESIA POSTPROCEDURE EVALUATION
Patient: Marcos Downing    Procedure: Procedure(s):  RIGHT EYE PHACOEMULSIFICATION, CATARACT, WITH INTRAOCULAR LENS IMPLANT, TORIC LENS  INSERTION of iSTENT       Anesthesia Type:  MAC    Note:  Disposition: Outpatient   Postop Pain Control: Uneventful            Sign Out: Well controlled pain   PONV: No   Neuro/Psych: Uneventful            Sign Out: Acceptable/Baseline neuro status   Airway/Respiratory: Uneventful            Sign Out: Acceptable/Baseline resp. status   CV/Hemodynamics: Uneventful            Sign Out: Acceptable CV status; No obvious hypovolemia; No obvious fluid overload   Other NRE: NONE   DID A NON-ROUTINE EVENT OCCUR? No           Last vitals:  Vitals Value Taken Time   /61 06/30/25 11:30   Temp 36.2  C (97.2  F) 06/30/25 11:30   Pulse 62 06/30/25 11:30   Resp 18 06/30/25 11:30   SpO2 99 % 06/30/25 11:31   Vitals shown include unfiled device data.    Electronically Signed By: Aggie Fernandez MD  June 30, 2025  1:18 PM

## 2025-06-30 NOTE — DISCHARGE INSTRUCTIONS
[facility]  Home Care Following Anesthesia  For 24 hours after surgery:  Get plenty of rest.  A responsible adult must stay with you for at least 24 hours after you leave the surgery center.  Do not drive or use heavy equipment.  If you have weakness or tingling, don't drive or use heavy equipment until this feeling goes away.   Do not drink alcohol.   Avoid strenuous or risky activities.  Ask for help when climbing stairs.  You may feel lightheaded.  IF so, sit for a few minutes before standing.  Have someone help you get up.   If you have nausea (feel sick to your stomach): Drink only clear liquids such as apple juice, ginger ale, broth or 7-Up.  Rest may also help.  Be sure to drink enough fluids.  Move to a regular diet as you feel able.   You may have a slight fever.  Call the doctor if your fever is over 100 F (37.7 C) (taken under the tongue) or lasts longer than 24 hours.  You may have a dry mouth, a sore throat, muscle aches or trouble sleeping. These should go away after 24 hours.  Do not make important or legal decisions.   It is recommended to avoid smoking.               Tips for taking pain medications  To get the best pain relief possible, remember these points:  Take pain medications as directed, before pain becomes severe.  Pain medication can upset your stomach: taking it with food may help.  Constipation is a common side effect of pain medication. Drink plenty of  fluids.  Eat foods high in fiber. Take a stool softener if recommended by your doctor or pharmacist.  Do not drink alcohol, drive or operate machinery while taking pain medications.  Ask about other ways to control pain, such as with heat, ice or relaxation.    Tylenol/Acetaminophen Consumption    If you feel your pain relief is insufficient, you may take Tylenol/Acetaminophen in addition to your narcotic pain medication.   Be careful not to exceed 4,000 mg of Tylenol/Acetaminophen in a 24 hour  period from all sources.  If you are taking extra strength Tylenol/acetaminophen (500 mg), the maximum dose is 8 tablets in 24 hours.  If you are taking regular strength acetaminophen (325 mg), the maximum dose is 12 tablets in 24 hours.    Call a doctor for any of the following:  Signs of infection (fever, growing tenderness at the surgery site, a large amount of drainage or bleeding, severe pain, foul-smelling drainage, redness, swelling).  It has been over 8 to 10 hours since surgery and you are still not able to urinate (pass water).  Headache for over 24 hours.  Numbness, tingling or weakness the day after surgery (if you had spinal anesthesia).  Signs of Covid-19 infection (temperature over 100 degrees, shortness of breath, cough, loss of taste/smell, generalized body aches, persistent headache, chills, sore throat, nausea/vomiting/diarrhea)    Your doctor is:       Dr. Thompson Parnell, Ophthalmology: [phone]               After hours and weekends call the hospital @ [phone] and ask for the resident on call for:  Ophthalmology  For emergency care, call the:  [facility]:  [phone] (TTY for hearing impaired: [phone])

## 2025-06-30 NOTE — PROGRESS NOTES
Dr. Fernandez informed of pts blood sugar of 237. Pt states he ate raisons and oatmeal last night. Had metformin yesterday.

## 2025-07-13 ENCOUNTER — HEALTH MAINTENANCE LETTER (OUTPATIENT)
Age: 85
End: 2025-07-13

## 2025-07-22 ENCOUNTER — OFFICE VISIT (OUTPATIENT)
Dept: OPHTHALMOLOGY | Facility: CLINIC | Age: 85
End: 2025-07-22
Attending: OPHTHALMOLOGY
Payer: MEDICARE

## 2025-07-22 DIAGNOSIS — Z96.1 PSEUDOPHAKIA, BOTH EYES: Primary | ICD-10-CM

## 2025-07-22 PROCEDURE — G0463 HOSPITAL OUTPT CLINIC VISIT: HCPCS | Performed by: OPHTHALMOLOGY

## 2025-07-22 PROCEDURE — 99024 POSTOP FOLLOW-UP VISIT: CPT | Mod: GC | Performed by: OPHTHALMOLOGY

## 2025-07-22 ASSESSMENT — VISUAL ACUITY
OD_SC+: -2
METHOD: SNELLEN - LINEAR
OS_PH_SC: 20/25
OD_SC: 20/20
OS_PH_SC+: -1
OS_SC: 20/30
OS_SC+: +2

## 2025-07-22 ASSESSMENT — CONF VISUAL FIELD
OS_SUPERIOR_NASAL_RESTRICTION: 0
METHOD: COUNTING FINGERS
OD_INFERIOR_TEMPORAL_RESTRICTION: 0
OD_INFERIOR_NASAL_RESTRICTION: 0
OS_INFERIOR_NASAL_RESTRICTION: 0
OD_SUPERIOR_NASAL_RESTRICTION: 0
OS_SUPERIOR_TEMPORAL_RESTRICTION: 0
OD_SUPERIOR_TEMPORAL_RESTRICTION: 0
OD_NORMAL: 1
OS_NORMAL: 1
OS_INFERIOR_TEMPORAL_RESTRICTION: 0

## 2025-07-22 ASSESSMENT — TONOMETRY
OS_IOP_MMHG: 14
IOP_METHOD: TONOPEN
OD_IOP_MMHG: 20

## 2025-07-22 ASSESSMENT — REFRACTION_MANIFEST
OS_ADD: +2.50
OS_SPHERE: PLANO
OD_AXIS: 115
OD_SPHERE: PLANO
OS_AXIS: 074
OD_CYLINDER: +0.25
OD_ADD: +2.50
OS_CYLINDER: +0.50

## 2025-07-22 ASSESSMENT — EXTERNAL EXAM - LEFT EYE: OS_EXAM: BROW PTOSIS

## 2025-07-22 ASSESSMENT — CUP TO DISC RATIO
OD_RATIO: 0.6
OS_RATIO: 0.7

## 2025-07-22 ASSESSMENT — EXTERNAL EXAM - RIGHT EYE: OD_EXAM: BROW PTOSIS

## 2025-07-22 NOTE — PROGRESS NOTES
HPI       Post Op (Ophthalmology) Both Eyes    Associated symptoms include Negative for eye pain, flashes and floaters.  Treatments tried include eye drops and artificial tears.             Comments    Pt reports wonderful vision in the right eye but his left eye is more blurry.     Ocular Meds:   Pred right eye qday  Ats PRN each eye  He does not take any pressure drops    Dorothea Land OA 9:38 AM July 22, 2025             Last edited by Dorothea Land on 7/22/2025  9:41 AM.          Review of systems for the eyes was negative other than the pertinent positives/negatives listed in the HPI.      Assessment & Plan      Marcos Downing is a 85 year old male with the following diagnoses:   1. Pseudophakia, both eyes         S/P cataract extraction + OMNI 360 left eye 5/19/25; right eye with iStent 6/30/25  Doing well  Intraocular pressure great left eye; borderline right eye     Ok to resume normal activities  Taper Predforte as directed  Monitor intraocular pressure without drops for now  Glasses prescription updated  Artificial tears as needed      Goal intraocular pressure low teens left eye; mid-high teens right eye   Borderline today in both eyes   Recheck after steroid cessation      Patient disposition:   Return in about 2 months (around 9/22/2025) for Dr. Castillo to continue glaucoma management.  Parnell as needed          Attending Physician Attestation:  Complete documentation of historical and exam elements from today's encounter can be found in the full encounter summary report (not reduplicated in this progress note).  I personally obtained the chief complaint(s) and history of present illness.  I confirmed and edited as necessary the review of systems, past medical/surgical history, family history, social history, and examination findings as documented by others; and I examined the patient myself.  I personally reviewed the relevant tests, images, and reports as documented above.  I  formulated and edited as necessary the assessment and plan and discussed the findings and management plan with the patient and family. . - Thompson Parnell MD

## 2025-07-26 PROBLEM — H40.001 GLAUCOMA SUSPECT OF RIGHT EYE: Status: RESOLVED | Noted: 2017-02-04 | Resolved: 2025-07-26

## 2025-07-26 PROBLEM — E11.9 TYPE 2 DIABETES MELLITUS WITHOUT RETINOPATHY (H): Status: RESOLVED | Noted: 2017-01-06 | Resolved: 2025-07-26

## 2025-07-26 PROBLEM — H25.811 COMBINED FORM OF AGE-RELATED CATARACT, RIGHT EYE: Status: RESOLVED | Noted: 2025-05-19 | Resolved: 2025-07-26

## (undated) DEVICE — EYE TIP IRRIGATION & ASPIRATION POLYMER CVD 0.3MM 8065751512

## (undated) DEVICE — EYE SHIELD PLASTIC

## (undated) DEVICE — SOL WATER IRRIG 500ML BOTTLE 2F7113

## (undated) DEVICE — LINEN TOWEL PACK X5 5464

## (undated) DEVICE — CATARACT BLADE PACK 2.6MM 58001899

## (undated) DEVICE — EYE PACK CUSTOM ANTERIOR 30DEG TIP CENTURION PPK6682-04

## (undated) DEVICE — GLOVE BIOGEL PI MICRO SZ 7.5 48575

## (undated) DEVICE — GLOVE PROTEXIS MICRO 7.5 LT BLUE 2D73PM75

## (undated) DEVICE — PACK CATARACT CUSTOM ASC SEY15CPUMC

## (undated) DEVICE — SYSTEM OMNI SURGICAL LF 1-102

## (undated) RX ORDER — FENTANYL CITRATE 50 UG/ML
INJECTION, SOLUTION INTRAMUSCULAR; INTRAVENOUS
Status: DISPENSED
Start: 2025-06-30

## (undated) RX ORDER — FENTANYL CITRATE 50 UG/ML
INJECTION, SOLUTION INTRAMUSCULAR; INTRAVENOUS
Status: DISPENSED
Start: 2025-05-19

## (undated) RX ORDER — ACETAMINOPHEN 325 MG/1
TABLET ORAL
Status: DISPENSED
Start: 2025-06-30

## (undated) RX ORDER — ACETAMINOPHEN 325 MG/1
TABLET ORAL
Status: DISPENSED
Start: 2025-05-19